# Patient Record
Sex: FEMALE | Race: WHITE | NOT HISPANIC OR LATINO | Employment: UNEMPLOYED | URBAN - METROPOLITAN AREA
[De-identification: names, ages, dates, MRNs, and addresses within clinical notes are randomized per-mention and may not be internally consistent; named-entity substitution may affect disease eponyms.]

---

## 2023-09-26 RX ORDER — MULTIPLE VITAMINS W/ MINERALS TAB 9MG-400MCG
1 TAB ORAL DAILY
COMMUNITY

## 2023-09-26 RX ORDER — CETIRIZINE HYDROCHLORIDE 10 MG/1
10 TABLET ORAL DAILY
COMMUNITY

## 2023-09-26 RX ORDER — OMEPRAZOLE 20 MG/1
20 CAPSULE, DELAYED RELEASE ORAL DAILY
COMMUNITY

## 2023-12-28 ENCOUNTER — OFFICE VISIT (OUTPATIENT)
Dept: BARIATRICS/WEIGHT MGMT | Facility: CLINIC | Age: 28
End: 2023-12-28
Payer: COMMERCIAL

## 2023-12-28 ENCOUNTER — DOCUMENTATION (OUTPATIENT)
Dept: BARIATRICS/WEIGHT MGMT | Facility: CLINIC | Age: 28
End: 2023-12-28
Payer: COMMERCIAL

## 2023-12-28 VITALS
OXYGEN SATURATION: 99 % | HEIGHT: 66 IN | DIASTOLIC BLOOD PRESSURE: 88 MMHG | BODY MASS INDEX: 38.81 KG/M2 | TEMPERATURE: 97.7 F | SYSTOLIC BLOOD PRESSURE: 138 MMHG | RESPIRATION RATE: 16 BRPM | WEIGHT: 241.5 LBS | HEART RATE: 89 BPM

## 2023-12-28 DIAGNOSIS — E66.9 OBESITY, CLASS II, BMI 35-39.9: Primary | ICD-10-CM

## 2023-12-28 DIAGNOSIS — R53.83 FATIGUE, UNSPECIFIED TYPE: ICD-10-CM

## 2023-12-28 DIAGNOSIS — K21.9 GASTROESOPHAGEAL REFLUX DISEASE, UNSPECIFIED WHETHER ESOPHAGITIS PRESENT: ICD-10-CM

## 2023-12-28 PROBLEM — Z86.19 HISTORY OF HELICOBACTER PYLORI INFECTION: Status: ACTIVE | Noted: 2023-12-28

## 2023-12-28 PROBLEM — R00.2 PALPITATIONS: Status: ACTIVE | Noted: 2023-12-28

## 2023-12-28 PROBLEM — Z86.14 HISTORY OF MRSA INFECTION: Status: ACTIVE | Noted: 2023-12-28

## 2023-12-28 PROBLEM — R73.03 PREDIABETES: Status: ACTIVE | Noted: 2023-12-28

## 2023-12-28 PROBLEM — E28.2 POLYCYSTIC OVARIAN DISEASE: Status: ACTIVE | Noted: 2023-12-28

## 2023-12-28 PROBLEM — R11.0 NAUSEA: Status: ACTIVE | Noted: 2023-12-28

## 2023-12-28 PROBLEM — M54.9 BACK PAIN: Status: ACTIVE | Noted: 2023-12-28

## 2023-12-28 PROBLEM — Z90.3 H/O GASTRIC SLEEVE: Status: ACTIVE | Noted: 2023-12-28

## 2023-12-28 PROBLEM — E66.812 OBESITY, CLASS II, BMI 35-39.9: Status: ACTIVE | Noted: 2023-12-28

## 2023-12-28 PROCEDURE — 99204 OFFICE O/P NEW MOD 45 MIN: CPT | Performed by: PHYSICIAN ASSISTANT

## 2023-12-28 RX ORDER — LOSARTAN POTASSIUM 25 MG/1
25 TABLET ORAL DAILY
COMMUNITY

## 2023-12-28 NOTE — PROGRESS NOTES
"Weight Loss Surgery  Presurgical Nutrition Assessment     Annie Kent  12/28/2023  [id number]  [id number]  1995   female    Surgery desired: Revision - Patient desires SIPS after sleeve gastrectomy surgery at another clinic on 03/18/2021, after which she lost from 268 # to 216 #.  She has regained to 241.5 #.     HEIGHT 167.6 cm (66\")   WEIGHT 110 kg (241.5 #)   BMI 38.98        Highest weight ever:   122 kg  (268 #)      No Known Allergies    Current Outpatient Medications:     brompheniramine-phenylephrine-DM 2.5-1-5 MG/5ML elixir elixir, Take 10 mL by mouth Every 4 (Four) Hours As Needed (sinus congestion, drainage and cough)., Disp: 237 mL, Rfl: 0    Calcium Carbonate Antacid (CALCIUM CARBONATE PO), Take  by mouth., Disp: , Rfl:     cetirizine (zyrTEC) 10 MG tablet, Take 1 tablet by mouth Daily., Disp: , Rfl:     losartan (COZAAR) 25 MG tablet, Take 1 tablet by mouth Daily., Disp: , Rfl:     multivitamin with minerals tablet tablet, Take 1 tablet by mouth Daily., Disp: , Rfl:     omeprazole (priLOSEC) 20 MG capsule, Take 1 capsule by mouth Daily., Disp: , Rfl:     vitamin D3 125 MCG (5000 UT) capsule capsule, Take 1 capsule by mouth Daily., Disp: , Rfl:       Subjective information: Met with patient and her  for pre-revision consult. Patient noted that she has not participated in any formal weight management program or effort, including WW. She stated lack of awareness of techniques such as putting a fork down between bites, ordering salad dressing on the side, then dipping a fork in for flavor rather than pouring it on the greens, consistently eating only 1/2 the bread with a sandwich or burger. She did not state principles of healthy eating for weight management, and also said that after sleeve gastrectomy, when she stopped losing weight easily after the first 6 months or so, she then began eating calorically dense foods as desired, as she had  in the past.  Additionally, as below, she often " eats throughout the night.  We discussed the need to work on her nutrition knowledge deficit and to make eating behavior changes before surgery so that she will be successful after revision surgery.     Nutrition Recall   Example of Usual 24 hour intake:     Breakfast: breakfast burrito with salsa, water with or without flavor packets, and coffee with sugar-free creamer    Lunch: ham and cheese sandwich with cervantes, with water to drink    Dinner: a balanced meal such as steak, mashed potatoes or noodles, and corn, with water    Snacks: especially enjoys sweets such as cookies.  Also, Slim Jims and cheese. Wakes up hungry and eats throughout the night.     Beverages of Choice: Primarily water or water with flavor packet. No soda, no sweet tea    Food Allergies or Intolerances: none stated or recorded          Exercise / Activity:  no personal planned activity program at this time. She states that she stopped exercising when she began re-gaining weight.      Assessment of Nutritional Adequacy, Excessive Intake or Deficiencies:        Protein intake is deficient to ensure successful weight loss. No protein-containing snacks reported                                       Processed / simple Carbohydrate intake is: high due to meal content of tortillas, bread for sandwiches, starchy vegetables: mashed potatoes, corn, noodles and fries with fast food                                       Balance of diet with a variety of fruits and vegetables is: diet is not balanced - no vegetables recorded                                                       Reliance on restaurant food including fast food is: several times per week: Mc[employer] sandwiches, grilled chicken nuggets with fries                                                                          Ingestion of sweet beverages eg soda, sweet tea, fruit juice: frequent high sugar snacks such as cookies      Education    Provided Nutrition Guidelines for Bariatric and Metabolic  Surgery   Reviewed guidelines for higher protein, limited carbohydrate diet to promote weight loss.  Encouraged patient to incorporate these principles of healthy eating.    Educated patient to wisely choose an appropriate protein supplement beverage for the post-surgery liquid diet.  Provided product guidelines and examples.    Explained importance of goal setting to help in changing eating behaviors that are not conducive to weight loss.  Specific macronutrient goals as below.   Provided follow-up options for support, including contact information for dietitians here.     Discussed importance of tracking grams of protein and carbohydrate in diet.  Web-based support information and apps for smart phones and computers given.        Nutrition Goals   Protein goal:  grams per day in three regular balanced meals and two to three high protein snacks each day, to ensure desired weight loss.   Carbohydrate goal:  100-140 grams per day  Beverage goal: Appropriate non-carbonated beverage intake.  Patient to wean self off of any sweet beverages, including soda.    Exercise Goals  Continue current exercise routine, if appropriate, and obtain approval from caregiver if physically limited for any reason.   Start activity plan per PCP/specialist advice if not currently exercising.     Recommend that team proceed with surgery and follow per protocol.   Alma Shields RD  12/28/2023  11:26 EST

## 2023-12-28 NOTE — PROGRESS NOTES
[facility]  [address]  [address]  [facility] [id number] [phone]      Patient  Name:  Annie Kent  :  1995      Date of Visit: 2023      Chief Complaint:  weight gain; unable to maintain weight loss      History of Present Illness:  Annie Kent is a 28 y.o. female who presents today for evaluation, education and consultation regarding revision metabolic and bariatric surgery with Dr. Arce.     Annie has been overweight for at least 20+ years, has been 35 pounds or more overweight for at least 20 years, and started dieting at a young age.  Previous diet attempts include: High Protein and Low Carbohydrate; None; None.  The most weight Annie lost was 50 pounds but was unable to maintain that weight loss.  Her maximum lifetime weight is 268 pounds.      S/p LSG 3/18/21 w/ Dr. Bird.     Weight prior to LSG 268lbs   Lowest weight achieved: 213lbs     Still feels like she has good restriction, but now just snack/grazing. Feels like she gets hungry quickly.     GI: Has had significant GERD since she had her sleeve gastrectomy.  She is on omeprazole 20 mg daily.  She has a history of H. pylori infection and was treated with a Prevpac.  She recently was retested and it was negative.  Reports chronic nausea with occasional vomiting since her sleeve.  She had a laparoscopic cholecystectomy in her teens for gallstones.    Other past medical history significant for diabetes, PCOS, palpitations, back pain, history of MRSA, anxiety     Complete history has been obtained and discussed today, as pertinent to metabolic/ bariatric surgery.     Past Medical History:   Diagnosis Date    Back pain     chiropractor; PRN nsaids    GERD (gastroesophageal reflux disease)     Omeprazole daily. EGD prior to LSG;    H/O gastric sleeve     Dr. Bird 3/2021    History of Helicobacter pylori infection     treated w/ Prevpak. Recently retested and was negative    History of  MRSA infection     skin boil    Nausea     chronic since LSG    Palpitations     Polycystic ovarian disease     Prediabetes      Past Surgical History:   Procedure Laterality Date     SECTION  2012    lower transverse     SECTION  2018    GASTRIC SLEEVE LAPAROSCOPIC  2021    Dr. Bird    LAPAROSCOPIC CHOLECYSTECTOMY  2010    gallstones       No Known Allergies    Current Outpatient Medications:     brompheniramine-phenylephrine-DM 2.5-1-5 MG/5ML elixir elixir, Take 10 mL by mouth Every 4 (Four) Hours As Needed (sinus congestion, drainage and cough)., Disp: 237 mL, Rfl: 0    Calcium Carbonate Antacid (CALCIUM CARBONATE PO), Take  by mouth., Disp: , Rfl:     cetirizine (zyrTEC) 10 MG tablet, Take 1 tablet by mouth Daily., Disp: , Rfl:     losartan (COZAAR) 25 MG tablet, Take 1 tablet by mouth Daily., Disp: , Rfl:     multivitamin with minerals tablet tablet, Take 1 tablet by mouth Daily., Disp: , Rfl:     omeprazole (priLOSEC) 20 MG capsule, Take 1 capsule by mouth Daily., Disp: , Rfl:     vitamin D3 125 MCG (5000 UT) capsule capsule, Take 1 capsule by mouth Daily., Disp: , Rfl:     Social History     Socioeconomic History    Marital status:    Tobacco Use    Smoking status: Never    Smokeless tobacco: Never   Vaping Use    Vaping Use: Never used   Substance and Sexual Activity    Alcohol use: Yes     Comment: once a month    Drug use: No    Sexual activity: Defer     Social History     Social History Narrative    Lives in [place], [place]  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       Family History   Problem Relation Age of Onset    No Known Problems Mother     Sleep apnea Father     No Known Problems Sister     No Known Problems Brother     Hypertension Maternal Grandmother     Hypertension Maternal Grandfather     Diabetes Maternal Grandfather     Hypertension Paternal Grandmother     Heart disease Paternal Grandmother     Heart attack Paternal  Grandmother     Hypertension Paternal Grandfather     Stroke Paternal Grandfather        Review of Systems:  Constitutional:  reports fatigue, weight gain and denies fevers, chills.  HEENT:  denies headache, ear pain or loss of hearing, blurred or double vision, nasal discharge or sore throat.  Cardiovascular:  reports HTN, palpitations and denies HLD, CAD, Atrial Fib, hx heart disease, heart murmur, hx MI, chest pain, edema, hx DVT.  Respiratory:  reports none and denies dyspnea on exertion, shortness of breath , cough , wheezing, sleep apnea, asthma, COPD, hx PE.  Gastrointestinal:  reports heartburn, nausea, vomiting, abdominal pain, diarrhea, constipation and denies IBS, melena, blood in stool, gallbladder issues, liver disease, hx pancreatitis.  Genitourinary:  reports none and denies history of  frequent UTI, incontinence, hematuria, dysuria, polyuria, polydipsia, renal insufficiency, renal failure.    Musculoskeletal:  reports joint pain, back pain and denies fibromyalgia, arthritis, and autoimmune disease.  Neurological:  reports none and denies headaches, migraines, numbness /tingling, dizziness, confusion, seizure, stroke.  Psychiatric:  reports feeling anxious, hx anxiety and denies depressed mood, hx depression, bipolar disorder, suicidal ideation, hx suicide attempt, hx self injury, hx substance abuse, eating disorder.  Endocrine:  reports PCOS, prediabetes and denies endometriosis, glucose intolerance, diabetes, gout.  Hematologic:  reports none and denies bruising, bleeding disorder, hx anemia, hx blood transfusion.  Skin:  reports hx MRSA and denies rashes.    Physical Exam:  Vital Signs:  Weight: 110 kg (241 lb 8 oz)   Body mass index is 38.98 kg/m².  Temp: 97.7 °F (36.5 °C)   Heart Rate: 89   BP: 138/88     Physical Exam  Constitutional:       Appearance: She is obese.   HENT:      Head: Normocephalic and atraumatic.   Eyes:      Extraocular Movements: Extraocular movements intact.       Conjunctiva/sclera: Conjunctivae normal.   Cardiovascular:      Rate and Rhythm: Normal rate and regular rhythm.   Pulmonary:      Effort: Pulmonary effort is normal.      Breath sounds: Normal breath sounds.   Abdominal:      General: Bowel sounds are normal. There is no distension.      Palpations: Abdomen is soft. There is no mass.      Tenderness: There is no abdominal tenderness.      Comments: Old lap scars   Musculoskeletal:         General: Normal range of motion.      Cervical back: Normal range of motion and neck supple.   Skin:     General: Skin is warm and dry.   Neurological:      General: No focal deficit present.      Mental Status: She is alert and oriented to person, place, and time.   Psychiatric:         Mood and Affect: Mood normal.         Behavior: Behavior normal.         Thought Content: Thought content normal.         Judgment: Judgment normal.         Patient Active Problem List   Diagnosis    Polycystic ovarian disease    Palpitations    H/O gastric sleeve    History of Helicobacter pylori infection    Nausea    GERD (gastroesophageal reflux disease)    Back pain    Prediabetes    History of MRSA infection    Fatigue    Obesity, Class II, BMI 35-39.9       Assessment:  28 y.o. female with medically complicated obesity pursuing Revision.    Metabolic & Bariatric Surgery is deemed medically necessary given the following: Class 2 Severe Obesity (BMI >=35 and <=39.9). Obesity-related health conditions include the following: hypertension, GERD, and PCOS, anxiety, prediabetes . Obesity is worsening. BMI is is above average; BMI management plan is completed. We discussed consulting a Bariatric surgeon.        Plan:  Unknown options at this time. Will proceed with UGI followed by EGD for further evaluation.     Pending above testing, further evaluation will include: CBC, CMP, Lipids, TSH, HgA1C, H.Pylori UBT, UGI, and EGD.    Additional clearances needed prior to surgery will include: Cardiology.      Patient understands that bariatric surgery is not cosmetic surgery but rather a tool to help make a lifelong commitment to lifestyle changes including diet, exercise and behavior modifications.  The patient has been educated today on those expected postoperative lifestyle changes.  Psychological and Nutritional consultations will be completed prior to surgery.  Instructions on how to access [employer] (an internet based site w/ educational surgical videos) were given to the patient.  Recommended perioperative vitamin supplementation was reviewed.  The importance of avoiding ASA/ NSAIDS/ steroids/ tobacco/nicotine/ hormones/ immunomodulators perioperatively was discussed in detail.  All questions/concerns have been addressed.      Further input to follow pending the above.           NICKI Jean

## 2024-01-26 ENCOUNTER — OFFICE VISIT (OUTPATIENT)
Dept: OBSTETRICS AND GYNECOLOGY | Facility: CLINIC | Age: 29
End: 2024-01-26
Payer: COMMERCIAL

## 2024-01-26 VITALS
BODY MASS INDEX: 39.34 KG/M2 | HEIGHT: 66 IN | SYSTOLIC BLOOD PRESSURE: 148 MMHG | WEIGHT: 244.8 LBS | DIASTOLIC BLOOD PRESSURE: 110 MMHG

## 2024-01-26 DIAGNOSIS — E66.9 OBESITY, CLASS II, BMI 35-39.9: ICD-10-CM

## 2024-01-26 DIAGNOSIS — Z30.46 NEXPLANON REMOVAL: ICD-10-CM

## 2024-01-26 DIAGNOSIS — Z30.09 BIRTH CONTROL COUNSELING: ICD-10-CM

## 2024-01-26 DIAGNOSIS — Z30.011 VISIT FOR ORAL CONTRACEPTIVE PRESCRIPTION: ICD-10-CM

## 2024-01-26 DIAGNOSIS — Z12.4 SCREENING FOR CERVICAL CANCER: Primary | ICD-10-CM

## 2024-01-26 DIAGNOSIS — Z90.3 H/O GASTRIC SLEEVE: ICD-10-CM

## 2024-01-26 DIAGNOSIS — K21.9 GASTROESOPHAGEAL REFLUX DISEASE WITHOUT ESOPHAGITIS: ICD-10-CM

## 2024-01-26 RX ORDER — LEVOCETIRIZINE DIHYDROCHLORIDE 5 MG/1
5 TABLET, FILM COATED ORAL EVERY EVENING
COMMUNITY
Start: 2024-01-20

## 2024-01-26 RX ORDER — CYANOCOBALAMIN 1000 UG/ML
INJECTION, SOLUTION INTRAMUSCULAR; SUBCUTANEOUS
COMMUNITY
Start: 2023-12-22

## 2024-01-26 RX ORDER — DROSPIRENONE 4 MG/1
1 TABLET, FILM COATED ORAL DAILY
Qty: 90 TABLET | Refills: 3 | Status: SHIPPED | OUTPATIENT
Start: 2024-01-26 | End: 2025-01-25

## 2024-01-26 NOTE — PROGRESS NOTES
Patient is a  and new to this practice, wishing to establish gynecological care. No LMP recorded (lmp unknown). Patient is premenopausal. Patient's menstruals have been absent since her Nexplanon insertion 2021. She wishes to have the Nexplanon removed. Her last pap smear was approximately 2022 and normal, per patient. History of abnormal pap smear as a teenager with normal follow up colposcopy and pap smears. She reports she has been getting yearly pap smears since the abnormal.    Past Medical History:   Diagnosis Date    Anxiety 2017    Back pain     chiropractor; PRN nsaids    Carpal tunnel syndrome, left     Chronic nausea     chronic since LSG    GERD (gastroesophageal reflux disease)     Omeprazole daily. EGD prior to LSG;    H/O gastric sleeve     Dr. Bird 3/2021    Hiatal hernia     History of abnormal cervical Pap smear     History of Helicobacter pylori infection     treated w/ Prevpak. Recently retested and was negative    History of MRSA infection     skin boil at buttock as child    History of postpartum hemorrhage     History of pre-eclampsia 2018    History of twin pregnancy in prior pregnancy     Hypertension     Left lateral epicondylitis     cortisone injections and PT    Nexplanon in place     inserted 2021    Palpitations     with high stress    Polycystic ovarian disease     PONV (postoperative nausea and vomiting)     Postpartum spinal headache     Prediabetes     per external records; controlled post-sleeve    Vitamin B deficiency       Past Surgical History:   Procedure Laterality Date     SECTION      lower transverse     SECTION  2018    COLPOSCOPY W/ BIOPSY / CURETTAGE      negative, per patient    D & C WITH SUCTION      EAB    GASTRIC SLEEVE LAPAROSCOPIC  2021    Dr. Bird    LAPAROSCOPIC CHOLECYSTECTOMY      gallstones    WISDOM TOOTH EXTRACTION  2015      Family History   Problem Relation Age of Onset    Sleep apnea Father      Hypertension Mother     No Known Problems Brother     No Known Problems Sister     Hypertension Paternal Grandfather     Stroke Paternal Grandfather     Hypertension Paternal Grandmother     Heart disease Paternal Grandmother     Heart attack Paternal Grandmother     Hypertension Maternal Grandmother     Hypertension Maternal Grandfather     Diabetes Maternal Grandfather     Pancreatic cancer Maternal Great-Grandfather     Breast cancer Neg Hx     Ovarian cancer Neg Hx     Colon cancer Neg Hx     Colon polyps Neg Hx      Problem List Items Addressed This Visit       Birth control counseling    Overview     Patient with history of hypertension on medication.  Unable to take estrogen containing oral contraceptives.  Contraceptive options reviewed including Mirena IUD.  Has been planning vasectomy.  Rx for Slynd sent to pharmacy.         GERD (gastroesophageal reflux disease)    H/O gastric sleeve    Overview     Gastric sleeve in 2021 at [facility].  60 pound weight loss initially and regained 35 pounds.  Has consulted with Dr. Arce for revision of sleeve and repair of hiatal hernia.         Nexplanon removal    Overview     History of Nexplanon insertion in September 2021.  No menses with Nexplanon.  1/26/2024 Nexplanon removal left arm without difficulty..         Relevant Orders    Removal of Nexplanon    Obesity, Class II, BMI 35-39.9    Overview     Gastric sleeve in 2021 at [facility].  60 pound weight loss initially and regained 35 pounds.  Has consulted with Dr. Arce for revision of sleeve and repair of hiatal hernia.         Screening for cervical cancer - Primary    Overview     History of abnormal Pap and colposcopy as a teenager.    Annual Pap smears have been normal.    Last Pap smear March 2023.   Unsure if had HPV screening.  Recommend HPV Greening with next Pap smear in March 2024.         Visit for oral contraceptive prescription    Overview     Rx for Slynd sent to  "pharmacy.           Procedure: Nexplanon removal    Removal of Nexplanon    Date/Time: 1/26/2024 1:08 PM    Performed by: Miguel Arriaga MD  Authorized by: Miguel Arriaga MD  Preparation: Patient was prepped and draped in the usual sterile fashion.  Local anesthesia used: yes  Anesthesia: local infiltration    Anesthesia:  Local anesthesia used: yes  Local Anesthetic: lidocaine 1% with epinephrine  Anesthetic total: 1.5 mL    Sedation:  Patient sedated: no    Patient tolerance: patient tolerated the procedure well with no immediate complications          Pre Dx: 1) Nexplanon removal  Post Dx: 1) Nexplanon removal   The risks, benefits, and alternatives to removal and reinsertion of the device have been discussed with the patient at length. All of her questions are answered. She is aware of the need for alternative means of contraception if desired. Verbal informed consent is obtained.    Patient does not have an allergy to betadine or shellfish.    Patient reports she is interested in discussing a new form of birth control today.    BP (!) 148/110 (BP Location: Right arm, Patient Position: Sitting, Cuff Size: Large Adult)   Ht 167.6 cm (66\")   Wt 111 kg (244 lb 12.8 oz)   LMP  (LMP Unknown)   BMI 39.51 kg/m²       Time out: immediate members of the procedure team and patient agree to the following: correct patient, correct site, correct procedure to be performed.   Miguel Arriaga MD      Able to easily palpate the device in the  Left arm. Additional imaging was not required. The device feels freely mobile and easily accessible. She was placed in the examining table in a supine position with her arm flexed at the elbow and hand under her head. The skin over this site is prepped with Betadine. 2-3 mL of 1% lidocaine with epinephrine was injected.    Downward pressure was applied on the proximal end of the implant nearest the axilla, and a 2-3 mm incision was made in a longitudinal direction of the " arm at the tip of the implant closest to the elbow. The implant was then pushed gently toward the incision until the tip was visible. The fibrous capsule was opened with blunt dissection using a hemostat. The implant was grasp with a hemostat and was easily removed intact. It measured a  full 4 cm in length.    Steristrip was placed over incision site as well as a pressure dressing.     Tolerated well  No apparent complications  She was advised to call or return for complications such as fever, signs of infection, increased pain, or any other concerns.    Warning signs, limitations and expectations reviewed.     Miguel Arriaga MD  01/26/2024

## 2024-01-29 ENCOUNTER — TELEPHONE (OUTPATIENT)
Dept: OBSTETRICS AND GYNECOLOGY | Facility: CLINIC | Age: 29
End: 2024-01-29
Payer: COMMERCIAL

## 2024-01-29 NOTE — TELEPHONE ENCOUNTER
Prior Auth pending for Slynd. Her Nexplanon was removed. She has  HTN and needs the POP. Approved today  PA Case: [id number], Status: Approved, Coverage Starts on: 1/29/2024 12:00:00 AM, Coverage Ends on: 1/28/2025 12:00:00 AM.  Authorization Expiration Date: 1/27/2025. Pharmacy contacted - it was still very expensive but with the Slynd Savings card it was $25 for 84.

## 2024-02-09 ENCOUNTER — HOSPITAL ENCOUNTER (OUTPATIENT)
Dept: GENERAL RADIOLOGY | Facility: HOSPITAL | Age: 29
Discharge: HOME | End: 2024-02-09
Admitting: PHYSICIAN ASSISTANT
Payer: COMMERCIAL

## 2024-02-09 DIAGNOSIS — K21.9 GASTROESOPHAGEAL REFLUX DISEASE, UNSPECIFIED WHETHER ESOPHAGITIS PRESENT: ICD-10-CM

## 2024-02-09 PROCEDURE — 74240 X-RAY XM UPR GI TRC 1CNTRST: CPT

## 2024-02-13 DIAGNOSIS — K21.9 GASTROESOPHAGEAL REFLUX DISEASE, UNSPECIFIED WHETHER ESOPHAGITIS PRESENT: Primary | ICD-10-CM

## 2024-03-22 ENCOUNTER — TELEMEDICINE (OUTPATIENT)
Dept: BARIATRICS/WEIGHT MGMT | Facility: CLINIC | Age: 29
End: 2024-03-22
Payer: COMMERCIAL

## 2024-03-22 DIAGNOSIS — K21.9 GASTROESOPHAGEAL REFLUX DISEASE, UNSPECIFIED WHETHER ESOPHAGITIS PRESENT: Primary | ICD-10-CM

## 2024-03-22 DIAGNOSIS — E66.9 OBESITY, CLASS II, BMI 35-39.9: ICD-10-CM

## 2024-03-22 PROCEDURE — 99214 OFFICE O/P EST MOD 30 MIN: CPT | Performed by: PHYSICIAN ASSISTANT

## 2024-03-22 NOTE — PROGRESS NOTES
"[facility]  [address]  [address]  [facility] [id number] [phone]    This visit was conducted as a video visit, in an effort to limit spread of the novel coronavirus during the COVID-19 pandemic.  The patient gave consent.        Patient Name:  Annie Kent  :  1995      Date of Visit: 2024      Reason for Visit:   weight gain; GERD      HPI: Annie Kent is a 28 y.o. female s/p LSG 3/18/21 w/ Dr. Bird, pursuing revision with Dr. Arce.     Per initial intake eval 23:     \"Annie has been overweight for at least 20+ years, has been 35 pounds or more overweight for at least 20 years, and started dieting at a young age.  Previous diet attempts include: High Protein and Low Carbohydrate; None; None.  The most weight Annie lost was 50 pounds but was unable to maintain that weight loss.  Her maximum lifetime weight is 268 pounds.       S/p LSG 3/18/21 w/ Dr. Bird.      Weight prior to LSG 268lbs   Lowest weight achieved: 213lbs      Still feels like she has good restriction, but now just snack/grazing. Feels like she gets hungry quickly.      GI: Has had significant GERD since she had her sleeve gastrectomy.  She is on omeprazole 20 mg daily.  She has a history of H. pylori infection and was treated with a Prevpac.  She recently was retested and it was negative.  Reports chronic nausea with occasional vomiting since her sleeve.  She had a laparoscopic cholecystectomy in her teens for gallstones.     Other past medical history significant for diabetes, PCOS, palpitations, back pain, history of MRSA, anxiety\"    UGI 24    IMPRESSION:  1. Small hiatal hernia.  2. Spontaneous GE reflux as above.  3. Postoperative changes of the stomach.      No major changes to medical history or recent illness/hospitalizations. UGI reviewed as above. She is eager to proceed with EGD.     Past Medical History:   Diagnosis Date    Anxiety 2017    Back pain     " chiropractor; PRN nsaids    Carpal tunnel syndrome, left     Chronic nausea     chronic since LSG    GERD (gastroesophageal reflux disease)     Omeprazole daily. EGD prior to LSG;    H/O gastric sleeve     Dr. Bird 3/2021    Hiatal hernia     History of abnormal cervical Pap smear     History of Helicobacter pylori infection     treated w/ Prevpak. Recently retested and was negative    History of MRSA infection     skin boil at buttock as child    History of postpartum hemorrhage     History of pre-eclampsia 2018    History of twin pregnancy in prior pregnancy     Hypertension     Left lateral epicondylitis     cortisone injections and PT    Nexplanon in place     inserted 2021    Palpitations     with high stress    Polycystic ovarian disease     PONV (postoperative nausea and vomiting)     Postpartum spinal headache     Prediabetes     per external records; controlled post-sleeve    Vitamin B deficiency      Past Surgical History:   Procedure Laterality Date     SECTION      lower transverse     SECTION  2018    COLPOSCOPY W/ BIOPSY / CURETTAGE      negative, per patient    D & C WITH SUCTION      EAB    GASTRIC SLEEVE LAPAROSCOPIC  2021    Dr. Bird    LAPAROSCOPIC CHOLECYSTECTOMY      gallstones    WISDOM TOOTH EXTRACTION       Outpatient Medications Marked as Taking for the 3/22/24 encounter (Telemedicine) with Lisa Parada PA   Medication Sig Dispense Refill    Calcium Carbonate Antacid (CALCIUM CARBONATE PO) Take  by mouth.      cetirizine (zyrTEC) 10 MG tablet Take 1 tablet by mouth Daily.      cyanocobalamin 1000 MCG/ML injection INJECT 1ML INTRAMUSCULARLY ONCE WEEKLY FOR FOUR WEEKS, THEN INJECT 1ML INTRAMUSCULARLY ONCE MONTHLY THEREAFTER      Drospirenone (Slynd) 4 MG tablet Take 1 tablet by mouth Daily. 90 tablet 3    levocetirizine (XYZAL) 5 MG tablet Take 1 tablet by mouth Every Evening.      losartan (COZAAR) 25 MG tablet Take 1 tablet by mouth Daily.       multivitamin with minerals tablet tablet Take 1 tablet by mouth Daily.      omeprazole (priLOSEC) 20 MG capsule Take 1 capsule by mouth Daily.      vitamin D3 125 MCG (5000 UT) capsule capsule Take 1 capsule by mouth Daily.         Allergies   Allergen Reactions    Lisinopril Cough       Social History     Socioeconomic History    Marital status:    Tobacco Use    Smoking status: Never    Smokeless tobacco: Never   Vaping Use    Vaping status: Never Used   Substance and Sexual Activity    Alcohol use: Yes     Comment: once a month, social    Drug use: No    Sexual activity: Yes     Partners: Male     Birth control/protection: Nexplanon     Social History     Social History Narrative    Lives in [place], [place]  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.       Review of Systems   General: + weight gain and fatigue. Denies fever, chills, unintentional weight loss   HEENT: Denies nasal congestion, change in vision, ear pain, change in hearing, sore throat   CARDIAC: Denies chest pain, palpitations, edema   RESP: denies shortness of breath, cough, wheezing   GI: + CHELSEY. Denies abdominal pain, nausea, vomiting, diarrhea, constipation,   Urinary: Denies polyuria, dysuria, hematuria   MSK: denies joint pain, swelling, gout, joint stiffness   Neuro: Denies seizures, weakness, numbness/tingling, LOC   Heme/Endo: denies bleeding, bruising, heat/cold intolerance, sweating   Psych: denies depression, anxiety    There were no vitals taken for this visit.    Physical Exam  Constitutional:       General: She is not in acute distress.  Pulmonary:      Effort: Pulmonary effort is normal.   Neurological:      Mental Status: She is alert and oriented to person, place, and time.   Psychiatric:         Mood and Affect: Mood normal.         Behavior: Behavior normal.         Thought Content: Thought content normal.         Judgment: Judgment normal.           Assessment:      ICD-10-CM  ICD-9-CM   1. Gastroesophageal reflux disease, unspecified whether esophagitis present  K21.9 530.81   2. Obesity, Class II, BMI 35-39.9  E66.9 278.00              Plan:  Will proceed with EGD. The risks and benefits of the upper endoscopy were discussed with the patient in detail and all questions were answered.  Possibility of perforation, bleeding, aspiration, and anesthesia reaction were reviewed.  Patient agrees to proceed.

## 2024-03-25 ENCOUNTER — LAB REQUISITION (OUTPATIENT)
Dept: LAB | Facility: HOSPITAL | Age: 29
End: 2024-03-25
Payer: COMMERCIAL

## 2024-03-25 DIAGNOSIS — K21.9 GASTRO-ESOPHAGEAL REFLUX DISEASE WITHOUT ESOPHAGITIS: ICD-10-CM

## 2024-03-25 PROCEDURE — 88305 TISSUE EXAM BY PATHOLOGIST: CPT | Performed by: SURGERY

## 2024-03-26 ENCOUNTER — TELEPHONE (OUTPATIENT)
Dept: BARIATRICS/WEIGHT MGMT | Facility: CLINIC | Age: 29
End: 2024-03-26
Payer: COMMERCIAL

## 2024-03-26 LAB
CYTO UR: NORMAL
LAB AP CASE REPORT: NORMAL
LAB AP CLINICAL INFORMATION: NORMAL
PATH REPORT.FINAL DX SPEC: NORMAL
PATH REPORT.GROSS SPEC: NORMAL

## 2024-03-26 NOTE — TELEPHONE ENCOUNTER
----- Message from Rhett Arce MD sent at 3/25/2024  9:47 AM EDT -----  Please have her see me to discuss options, thanks!

## 2024-03-29 ENCOUNTER — OFFICE VISIT (OUTPATIENT)
Dept: OBSTETRICS AND GYNECOLOGY | Facility: CLINIC | Age: 29
End: 2024-03-29
Payer: COMMERCIAL

## 2024-03-29 VITALS
HEIGHT: 66 IN | DIASTOLIC BLOOD PRESSURE: 90 MMHG | WEIGHT: 243 LBS | SYSTOLIC BLOOD PRESSURE: 142 MMHG | BODY MASS INDEX: 39.05 KG/M2

## 2024-03-29 DIAGNOSIS — Z30.09 BIRTH CONTROL COUNSELING: ICD-10-CM

## 2024-03-29 DIAGNOSIS — N92.6 IRREGULAR MENSES: ICD-10-CM

## 2024-03-29 DIAGNOSIS — E28.2 POLYCYSTIC OVARIAN DISEASE: ICD-10-CM

## 2024-03-29 DIAGNOSIS — Z01.419 ENCOUNTER FOR ANNUAL ROUTINE GYNECOLOGICAL EXAMINATION: Primary | ICD-10-CM

## 2024-03-29 DIAGNOSIS — Z90.3 H/O GASTRIC SLEEVE: ICD-10-CM

## 2024-03-29 DIAGNOSIS — Z12.4 SCREENING FOR CERVICAL CANCER: ICD-10-CM

## 2024-03-29 DIAGNOSIS — E66.9 OBESITY, CLASS II, BMI 35-39.9: ICD-10-CM

## 2024-03-29 DIAGNOSIS — R73.03 PREDIABETES: ICD-10-CM

## 2024-03-29 DIAGNOSIS — K21.9 GASTROESOPHAGEAL REFLUX DISEASE, UNSPECIFIED WHETHER ESOPHAGITIS PRESENT: ICD-10-CM

## 2024-03-29 PROBLEM — Z30.011 VISIT FOR ORAL CONTRACEPTIVE PRESCRIPTION: Status: RESOLVED | Noted: 2024-01-26 | Resolved: 2024-03-29

## 2024-03-29 PROBLEM — E88.819 INSULIN RESISTANCE: Status: ACTIVE | Noted: 2024-03-29

## 2024-03-29 NOTE — PROGRESS NOTES
Gynecologic Annual Exam Note        Gynecologic Exam        Subjective     HPI  Annie Kent is a 28 y.o.  female who presents for annual well woman exam as an established patient. Since her last visit the patient was diagnosed with a small hiatal hernia, post EGD 3/25/24 . No LMP recorded (lmp unknown). Patient is premenopausal.  Her menstruals were absent with the Nexplanon in place; since Nexplanon removal on 24, she has not had a menstrual. She reports her UPT pre-EGD was negative on 3/25/24.   Marital Status: . She is sexually active. She has not had new partners. STD testing recommendations have been explained to the patient and she does not desire STD testing.    The patient would like to discuss the following complaints today: Recheck for BV; treated in December for it, but no test of cure. Patient denies current discharge or malodor.      Additional OB/GYN History   contraceptive methods: Vasectomy - performed 3/21 by Dr. Decker. Recheck for active sperm slated for ; pull-out method used as backup (no condoms). Patient reports she did not  the Slynd.  Desires to: do not start contraception  Thromboembolic Disease: none  History of migraines: yes without aura  Age of menarche: 12    History of STD: no    Last Pap: 2023. Results: negative. HPV: unknown .   Last Completed Pap Smear            Ordered - PAP SMEAR (Every 3 Years) Ordered on 3/29/2024      04/15/2023  Done - negative, per patient                     History of abnormal Pap smear: yes - led to colposcopy; normal since  Gardasil status: declines  Family history of uterine, colon, breast, or ovarian cancer: no  Performs monthly Self-Breast Exam: not monthly  Exercises Regularly: no, but active at work  Feelings of Anxiety or Depression: yes - mostly controlled; has tried medication before and did not like it. Reports she has historically been in therapy  Tobacco Usage?: No       Current Outpatient Medications:      Calcium Carbonate Antacid (CALCIUM CARBONATE PO), Take  by mouth., Disp: , Rfl:     cetirizine (zyrTEC) 10 MG tablet, Take 1 tablet by mouth Daily., Disp: , Rfl:     cyanocobalamin 1000 MCG/ML injection, INJECT 1ML INTRAMUSCULARLY ONCE WEEKLY FOR FOUR WEEKS, THEN INJECT 1ML INTRAMUSCULARLY ONCE MONTHLY THEREAFTER, Disp: , Rfl:     losartan (COZAAR) 25 MG tablet, Take 1 tablet by mouth Daily., Disp: , Rfl:     multivitamin with minerals tablet tablet, Take 1 tablet by mouth Daily., Disp: , Rfl:     omeprazole (priLOSEC) 20 MG capsule, Take 1 capsule by mouth Daily., Disp: , Rfl:     vitamin D3 125 MCG (5000 UT) capsule capsule, Take 1 capsule by mouth Daily., Disp: , Rfl:     Drospirenone (Slynd) 4 MG tablet, Take 1 tablet by mouth Daily. (Patient not taking: Reported on 3/29/2024), Disp: 90 tablet, Rfl: 3    levocetirizine (XYZAL) 5 MG tablet, Take 1 tablet by mouth Every Evening. (Patient not taking: Reported on 3/29/2024), Disp: , Rfl:      Patient denies the need for medication refills today.    OB History          3    Para   2    Term   1       1    AB   1    Living   3         SAB   0    IAB   1    Ectopic   0    Molar   0    Multiple   1    Live Births   3                Health Maintenance   Topic Date Due    Annual Gynecologic Pelvic and Breast Exam  Never done    TDAP/TD VACCINES (1 - Tdap) Never done    INFLUENZA VACCINE  Never done    COVID-19 Vaccine (3 - 2023-24 season) 2023    HEPATITIS C SCREENING  Never done    ANNUAL PHYSICAL  Never done    BMI FOLLOWUP  2024    PAP SMEAR  04/15/2026    Pneumococcal Vaccine 0-64  Aged Out       Past Medical History:   Diagnosis Date    Anxiety 2017    Back pain     chiropractor; PRN nsaids    Carpal tunnel syndrome, left     Chronic nausea     chronic since LSG    GERD (gastroesophageal reflux disease)     Omeprazole daily. EGD prior to LSG;    H/O gastric sleeve     Dr. Bird 3/2021    Hiatal hernia     History of abnormal cervical Pap  smear     History of Helicobacter pylori infection     treated w/ Prevpak. Recently retested and was negative    History of MRSA infection     skin boil at buttock as child    History of postpartum hemorrhage     History of pre-eclampsia 2018    History of twin pregnancy in prior pregnancy     Hypertension     Left lateral epicondylitis     cortisone injections and PT    Palpitations     with high stress    Polycystic ovarian disease     PONV (postoperative nausea and vomiting)     Postpartum spinal headache     Prediabetes     per external records; controlled post-sleeve    Vitamin B deficiency         Past Surgical History:   Procedure Laterality Date     SECTION      lower transverse     SECTION      COLPOSCOPY W/ BIOPSY / CURETTAGE      negative, per patient    D & C WITH SUCTION      EAB    ENDOSCOPY  2024    with biopsies    GASTRIC SLEEVE LAPAROSCOPIC  2021    Dr. Bird    LAPAROSCOPIC CHOLECYSTECTOMY      gallstones    WISDOM TOOTH EXTRACTION         The additional following portions of the patient's history were reviewed and updated as appropriate: allergies, current medications, past family history, past medical history, past social history, past surgical history, and problem list.    Review of Systems   Constitutional: Negative.  Negative for unexpected weight gain.   HENT: Negative.     Eyes: Negative.    Respiratory: Negative.     Cardiovascular: Negative.    Gastrointestinal: Negative.    Endocrine: Negative.    Genitourinary:  Positive for menstrual problem. Negative for breast discharge, breast lump, breast pain, dysuria, pelvic pain and pelvic pressure.   Musculoskeletal: Negative.    Skin: Negative.    Allergic/Immunologic: Negative.    Neurological: Negative.    Hematological: Negative.    Psychiatric/Behavioral: Negative.           I have reviewed and agree with the HPI, ROS, and historical information as entered above.   Miguel Arriaga,  "MD          Objective   /90 (BP Location: Right arm, Patient Position: Sitting, Cuff Size: Large Adult)   Ht 167.6 cm (66\")   Wt 110 kg (243 lb)   LMP  (LMP Unknown)   BMI 39.22 kg/m²     Physical Exam  Vitals and nursing note reviewed. Exam conducted with a chaperone present.   Constitutional:       Appearance: Normal appearance. She is well-developed. She is obese.   HENT:      Head: Normocephalic and atraumatic.   Neck:      Thyroid: No thyroid mass or thyromegaly.   Cardiovascular:      Rate and Rhythm: Normal rate and regular rhythm.      Heart sounds: Normal heart sounds. No murmur heard.  Pulmonary:      Effort: Pulmonary effort is normal. No retractions.      Breath sounds: Normal breath sounds. No wheezing, rhonchi or rales.   Chest:      Chest wall: No mass or tenderness.   Breasts:     Right: Normal. No mass, nipple discharge, skin change or tenderness.      Left: Normal. No mass, nipple discharge, skin change or tenderness.   Abdominal:      General: Bowel sounds are normal.      Palpations: Abdomen is soft. Abdomen is not rigid. There is no mass.      Tenderness: There is no abdominal tenderness. There is no guarding.      Hernia: No hernia is present. There is no hernia in the left inguinal area.   Genitourinary:     General: Normal vulva.      Exam position: Lithotomy position.      Labia:         Right: No rash, tenderness or lesion.         Left: No rash, tenderness or lesion.       Vagina: Normal. No vaginal discharge or lesions.      Cervix: No cervical motion tenderness, discharge, lesion or cervical bleeding.      Uterus: Normal. Not enlarged, not fixed and not tender.       Adnexa:         Right: No mass or tenderness.          Left: No mass or tenderness.        Rectum: No external hemorrhoid.      Comments: Uterus high in pelvis; no Nexa masses or tenderness.  Musculoskeletal:      Cervical back: Normal range of motion. No muscular tenderness.   Neurological:      Mental Status: She " is alert and oriented to person, place, and time.   Psychiatric:         Behavior: Behavior normal.            Assessment and Plan    Problem List Items Addressed This Visit          Gynecologic and Obstetric Problems    Irregular menses       Other    Birth control counseling    Overview     Patient with history of hypertension on medication.  Unable to take estrogen containing oral contraceptives.  Contraceptive options reviewed including Mirena IUD.  Has been planning vasectomy.  Rx for Slynd sent to pharmacy.         Obesity, Class II, BMI 35-39.9    Overview     Gastric sleeve in 2021 at [facility].  60 pound weight loss initially and regained 35 pounds.  Has consulted with Dr. Arce for revision of sleeve and repair of hiatal hernia.         Relevant Orders    Basic Metabolic Panel    Insulin, Random    Screening for cervical cancer    Overview     History of abnormal Pap and colposcopy as a teenager.    Annual Pap smears have been normal.    Last Pap smear March 2023.   Unsure if had HPV screening.  Recommend HPV Screening with next Pap smear in March 2024.         Relevant Orders    LIQUID-BASED PAP SMEAR WITH HPV GENOTYPING REGARDLESS OF INTERPRETATION ([facility])     Other Visit Diagnoses       Encounter for annual routine gynecological examination    -  Primary    Relevant Orders    LIQUID-BASED PAP SMEAR WITH HPV GENOTYPING REGARDLESS OF INTERPRETATION ([facility])            GYN annual well woman exam.  28 years of age.   status post recent vasectomy.  Never started Slynd for oral contraceptive.  History of irregular menses and PCOS.  No menses post Nexplanon removal in January 2024.  Recheck PCOS labs.  History of gastric sleeve.  Initially had 60 pound weight loss.  Has regained 25 pounds.  History of insulin resistance.  Repeat fasting insulin post weight loss surgery.  May benefit from metformin or Wegovy.  Reviewed pap guidelines.  Pap with HPV screen done.  If negative can  repeat in 3 years.  Reviewed monthly self breast exams.  Instructed to call with lumps, pain, or breast discharge.    Reviewed BMI and weight loss as preventative health measures.   Reccommended Flu Vaccine in Fall of each year.  RTC in 1 year or PRN with problems  No follow-ups on file.    Miguel Arriaga MD  03/29/2024

## 2024-04-02 DIAGNOSIS — A04.8 H. PYLORI INFECTION: Primary | ICD-10-CM

## 2024-04-02 RX ORDER — AMOXICILLIN 500 MG/1
1000 CAPSULE ORAL 2 TIMES DAILY
Qty: 56 CAPSULE | Refills: 0 | Status: SHIPPED | OUTPATIENT
Start: 2024-04-02 | End: 2024-04-16

## 2024-04-02 RX ORDER — OMEPRAZOLE 20 MG/1
20 CAPSULE, DELAYED RELEASE ORAL 2 TIMES DAILY
Qty: 28 CAPSULE | Refills: 0 | Status: SHIPPED | OUTPATIENT
Start: 2024-04-02 | End: 2024-04-16

## 2024-04-02 RX ORDER — CLARITHROMYCIN 500 MG/1
500 TABLET, COATED ORAL 2 TIMES DAILY
Qty: 28 TABLET | Refills: 0 | Status: SHIPPED | OUTPATIENT
Start: 2024-04-02 | End: 2024-04-16

## 2024-04-04 LAB — REF LAB TEST METHOD: NORMAL

## 2024-04-04 RX ORDER — FLUCONAZOLE 150 MG/1
150 TABLET ORAL ONCE
Qty: 1 TABLET | Refills: 0 | Status: SHIPPED | OUTPATIENT
Start: 2024-04-04 | End: 2024-04-04

## 2024-04-30 ENCOUNTER — CONSULT (OUTPATIENT)
Dept: BARIATRICS/WEIGHT MGMT | Facility: CLINIC | Age: 29
End: 2024-04-30
Payer: COMMERCIAL

## 2024-04-30 VITALS
HEART RATE: 85 BPM | RESPIRATION RATE: 16 BRPM | WEIGHT: 237.5 LBS | BODY MASS INDEX: 38.17 KG/M2 | DIASTOLIC BLOOD PRESSURE: 76 MMHG | HEIGHT: 66 IN | OXYGEN SATURATION: 100 % | SYSTOLIC BLOOD PRESSURE: 124 MMHG | TEMPERATURE: 97.3 F

## 2024-04-30 DIAGNOSIS — K44.9 HIATAL HERNIA WITH GASTROESOPHAGEAL REFLUX: ICD-10-CM

## 2024-04-30 DIAGNOSIS — Z98.84 S/P LAPAROSCOPIC SLEEVE GASTRECTOMY: ICD-10-CM

## 2024-04-30 DIAGNOSIS — K21.9 HIATAL HERNIA WITH GASTROESOPHAGEAL REFLUX: ICD-10-CM

## 2024-04-30 DIAGNOSIS — K31.1 PARTIAL GASTRIC OUTLET OBSTRUCTION: Primary | ICD-10-CM

## 2024-04-30 PROCEDURE — 99214 OFFICE O/P EST MOD 30 MIN: CPT | Performed by: SURGERY

## 2024-04-30 RX ORDER — PANTOPRAZOLE SODIUM 40 MG/10ML
40 INJECTION, POWDER, LYOPHILIZED, FOR SOLUTION INTRAVENOUS ONCE
OUTPATIENT
Start: 2024-04-30 | End: 2024-04-30

## 2024-04-30 RX ORDER — SODIUM CHLORIDE 9 MG/ML
40 INJECTION, SOLUTION INTRAVENOUS AS NEEDED
OUTPATIENT
Start: 2024-04-30

## 2024-04-30 RX ORDER — ENOXAPARIN SODIUM 100 MG/ML
40 INJECTION SUBCUTANEOUS ONCE
OUTPATIENT
Start: 2024-04-30 | End: 2024-04-30

## 2024-04-30 RX ORDER — SCOLOPAMINE TRANSDERMAL SYSTEM 1 MG/1
1 PATCH, EXTENDED RELEASE TRANSDERMAL ONCE
OUTPATIENT
Start: 2024-04-30 | End: 2024-04-30

## 2024-04-30 RX ORDER — OMEPRAZOLE 40 MG/1
40 CAPSULE, DELAYED RELEASE ORAL DAILY
COMMUNITY

## 2024-04-30 RX ORDER — SODIUM CHLORIDE 0.9 % (FLUSH) 0.9 %
3 SYRINGE (ML) INJECTION EVERY 12 HOURS SCHEDULED
OUTPATIENT
Start: 2024-04-30

## 2024-04-30 RX ORDER — GABAPENTIN 100 MG/1
600 CAPSULE ORAL ONCE
OUTPATIENT
Start: 2024-04-30 | End: 2024-04-30

## 2024-04-30 RX ORDER — SODIUM CHLORIDE, SODIUM LACTATE, POTASSIUM CHLORIDE, CALCIUM CHLORIDE 600; 310; 30; 20 MG/100ML; MG/100ML; MG/100ML; MG/100ML
150 INJECTION, SOLUTION INTRAVENOUS CONTINUOUS
OUTPATIENT
Start: 2024-04-30

## 2024-04-30 RX ORDER — SODIUM CHLORIDE 0.9 % (FLUSH) 0.9 %
3-10 SYRINGE (ML) INJECTION AS NEEDED
OUTPATIENT
Start: 2024-04-30

## 2024-04-30 RX ORDER — ACETAMINOPHEN 500 MG
1000 TABLET ORAL ONCE
OUTPATIENT
Start: 2024-04-30 | End: 2024-04-30

## 2024-04-30 RX ORDER — CHLORHEXIDINE GLUCONATE ORAL RINSE 1.2 MG/ML
30 SOLUTION DENTAL
OUTPATIENT
Start: 2024-04-30 | End: 2024-04-30

## 2024-05-24 ENCOUNTER — OFFICE VISIT (OUTPATIENT)
Dept: BARIATRICS/WEIGHT MGMT | Facility: CLINIC | Age: 29
End: 2024-05-24
Payer: COMMERCIAL

## 2024-05-24 VITALS
WEIGHT: 230.5 LBS | HEIGHT: 66 IN | TEMPERATURE: 98 F | HEART RATE: 80 BPM | DIASTOLIC BLOOD PRESSURE: 74 MMHG | SYSTOLIC BLOOD PRESSURE: 118 MMHG | BODY MASS INDEX: 37.04 KG/M2

## 2024-05-24 DIAGNOSIS — K29.60 BILE REFLUX GASTRITIS: ICD-10-CM

## 2024-05-24 DIAGNOSIS — A04.8 H. PYLORI INFECTION: ICD-10-CM

## 2024-05-24 DIAGNOSIS — K31.1 PARTIAL GASTRIC OUTLET OBSTRUCTION: Primary | ICD-10-CM

## 2024-05-24 DIAGNOSIS — K44.9 HIATAL HERNIA WITH GASTROESOPHAGEAL REFLUX: ICD-10-CM

## 2024-05-24 DIAGNOSIS — K21.9 GASTROESOPHAGEAL REFLUX DISEASE, UNSPECIFIED WHETHER ESOPHAGITIS PRESENT: ICD-10-CM

## 2024-05-24 DIAGNOSIS — K21.9 HIATAL HERNIA WITH GASTROESOPHAGEAL REFLUX: ICD-10-CM

## 2024-05-24 PROCEDURE — 99213 OFFICE O/P EST LOW 20 MIN: CPT | Performed by: SURGERY

## 2024-05-24 RX ORDER — VALACYCLOVIR HYDROCHLORIDE 500 MG/1
500 TABLET, FILM COATED ORAL DAILY
COMMUNITY
Start: 2024-05-23

## 2024-05-24 RX ORDER — CHOLESTYRAMINE 4 G/9G
4 POWDER, FOR SUSPENSION ORAL
Qty: 90 PACKET | Refills: 11 | Status: SHIPPED | OUTPATIENT
Start: 2024-05-24

## 2024-05-24 RX ORDER — ONDANSETRON 4 MG/1
4 TABLET, ORALLY DISINTEGRATING ORAL EVERY 8 HOURS PRN
Qty: 60 TABLET | Refills: 11 | Status: SHIPPED | OUTPATIENT
Start: 2024-05-24

## 2024-05-24 NOTE — PROGRESS NOTES
[facility]ariat[facility] Surgery  [address]  [address]  [facility] [id number] [phone]        Patient Name: Annie Kent.  YOB: 1995      Date of Visit: 05/24/2024      Reason for Visit:  follow up    HPI:  Annie Kent is a 28 y.o. female pursuing HHR, conversion of sleeve to RYGB with Dr. 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 Bird at [facility].  She saw Dr. Arce 4/30/24 to discuss options, and the above surgery was planned.     Was hopeful to have this surgery in August when she had 1 week off, and reports we planned to do prior auth closer to time of surgery in June.  But past week had burning lower abdominal discomfort and worsening acid reflux/nausea.  Has nausea with all oral intake.  Does not take zofran b/c she feels it causes constipation.    She can tolerate water and other liquids.  Was able to tolerate a small amount of grilled chicken yesterday but usually this is problematic.          Past Medical History:   Diagnosis Date    Anxiety 2017    Back pain     chiropractor; PRN nsaids    Carpal tunnel syndrome, left     Chronic nausea     chronic since LSG    GERD (gastroesophageal reflux disease)     Omeprazole daily. EGD prior to LSG;    H/O gastric sleeve     Dr. Bird 3/2021    Hiatal hernia     History of abnormal cervical Pap smear     History of Helicobacter pylori infection     treated w/ Prevpak. Recently retested and was negative.  EGD Dr. Arce 3/24 - abundant h. pylori, PrevPak prescribed, re-test pending    History of MRSA infection     skin boil at buttock as child    History of postpartum hemorrhage     History of pre-eclampsia 2018    History of twin pregnancy in prior pregnancy     Hypertension     Joint pain     Left lateral epicondylitis     cortisone injections and PT    DAMIEN (obstructive sleep apnea)     Palpitations     with high stress    Polycystic ovarian disease     PONV (postoperative  nausea and vomiting)     Postpartum spinal headache     Prediabetes     per external records; controlled post-sleeve    Tendonitis of elbow, left     Vitamin B deficiency      Past Surgical History:   Procedure Laterality Date     SECTION      lower transverse     SECTION      COLPOSCOPY W/ BIOPSY / CURETTAGE      negative, per patient    D & C WITH SUCTION      EAB    ENDOSCOPY  2024    with biopsies    GASTRIC SLEEVE LAPAROSCOPIC  2021    Dr. Bird    LAPAROSCOPIC CHOLECYSTECTOMY      gallstones    WISDOM TOOTH EXTRACTION       Outpatient Medications Marked as Taking for the 24 encounter (Office Visit) with Ellie Wolfe MD   Medication Sig Dispense Refill    Calcium Carbonate Antacid (CALCIUM CARBONATE PO) Take  by mouth.      cetirizine (zyrTEC) 10 MG tablet Take 1 tablet by mouth Daily.      cyanocobalamin 1000 MCG/ML injection INJECT 1ML INTRAMUSCULARLY ONCE WEEKLY FOR FOUR WEEKS, THEN INJECT 1ML INTRAMUSCULARLY ONCE MONTHLY THEREAFTER      losartan (COZAAR) 25 MG tablet Take 1 tablet by mouth Daily.      multivitamin with minerals tablet tablet Take 1 tablet by mouth Daily.      omeprazole (priLOSEC) 40 MG capsule Take 1 capsule by mouth Daily. Indications: Gastroesophageal Reflux Disease      valACYclovir (VALTREX) 500 MG tablet Take 1 tablet by mouth Daily. prn      vitamin D3 125 MCG (5000 UT) capsule capsule Take 1 capsule by mouth Daily.       Allergies   Allergen Reactions    Lisinopril Cough       Social History     Socioeconomic History    Marital status:    Tobacco Use    Smoking status: Never    Smokeless tobacco: Never   Vaping Use    Vaping status: Never Used   Substance and Sexual Activity    Alcohol use: Yes     Comment: once a month, social    Drug use: No    Sexual activity: Yes     Partners: Male     Birth control/protection: Vasectomy, Coitus interruptus       Vitals:    24 0834   BP: 118/74   Pulse: 80   Temp: 98  °F (36.7 °C)     Weight 105 kg (230 lb 8 oz)  Body mass index is 37.2 kg/m².    Physical Exam  Constitutional:       General: She is not in acute distress.     Appearance: She is well-developed. She is not diaphoretic.   HENT:      Head: Normocephalic and atraumatic.      Mouth/Throat:      Pharynx: No oropharyngeal exudate.   Eyes:      Conjunctiva/sclera: Conjunctivae normal.      Pupils: Pupils are equal, round, and reactive to light.   Pulmonary:      Effort: Pulmonary effort is normal. No respiratory distress.   Abdominal:      General: There is no distension.      Palpations: Abdomen is soft.   Skin:     General: Skin is warm and dry.      Coloration: Skin is not pale.   Neurological:      Mental Status: She is alert and oriented to person, place, and time.      Cranial Nerves: No cranial nerve deficit.   Psychiatric:         Behavior: Behavior normal.         Thought Content: Thought content normal.           Assessment:      ICD-10-CM ICD-9-CM   1. Partial gastric outlet obstruction  K31.1 537.0   2. H. pylori infection  A04.8 041.86   3. Hiatal hernia with gastroesophageal reflux  K44.9 553.3    K21.9 530.81   4. Gastroesophageal reflux disease, unspecified whether esophagitis present  K21.9 530.81   5. Bile reflux gastritis  K29.60 535.40       Plan:      Rx Zofran for nausea.  Take Miralax up to QID.  Lower abdominal discomfort would not be expected to be related to foregut issues.  Suspect some esophageal spasm/dysmotility from the bile acid reflux.  Rx Questran.  Pt aware mike worsen constipation.  Recommend 100 g/day protein, soft diet.  Avoid meat/starches to decrease dysphagia.  Need repeat H. Pylori test.  Not able to do today b/c cannot tolerate holding PPI.  Try to reschedule once she is on Questran and has held PPI.     Will obtain PA for surgery she previously discussed with Dr. Arce and hopefully move up the timeline b/c symptoms are worsening.

## 2024-05-30 ENCOUNTER — PRIOR AUTHORIZATION (OUTPATIENT)
Dept: BARIATRICS/WEIGHT MGMT | Facility: CLINIC | Age: 29
End: 2024-05-30
Payer: COMMERCIAL

## 2024-06-26 DIAGNOSIS — R06.00 DYSPNEA, UNSPECIFIED TYPE: ICD-10-CM

## 2024-06-26 DIAGNOSIS — R53.83 FATIGUE, UNSPECIFIED TYPE: Primary | ICD-10-CM

## 2024-07-01 ENCOUNTER — LAB (OUTPATIENT)
Dept: LAB | Facility: HOSPITAL | Age: 29
End: 2024-07-01
Payer: COMMERCIAL

## 2024-07-01 DIAGNOSIS — R53.83 FATIGUE, UNSPECIFIED TYPE: ICD-10-CM

## 2024-07-01 DIAGNOSIS — R06.00 DYSPNEA, UNSPECIFIED TYPE: ICD-10-CM

## 2024-07-01 LAB
ALBUMIN SERPL-MCNC: 4.3 G/DL (ref 3.5–5.2)
ALBUMIN/GLOB SERPL: 1.4 G/DL
ALP SERPL-CCNC: 70 U/L (ref 39–117)
ALT SERPL W P-5'-P-CCNC: 18 U/L (ref 1–33)
ANION GAP SERPL CALCULATED.3IONS-SCNC: 9.8 MMOL/L (ref 5–15)
AST SERPL-CCNC: 18 U/L (ref 1–32)
BILIRUB SERPL-MCNC: <0.2 MG/DL (ref 0–1.2)
BUN SERPL-MCNC: 9 MG/DL (ref 6–20)
BUN/CREAT SERPL: 12.3 (ref 7–25)
CALCIUM SPEC-SCNC: 9.5 MG/DL (ref 8.6–10.5)
CHLORIDE SERPL-SCNC: 101 MMOL/L (ref 98–107)
CO2 SERPL-SCNC: 26.2 MMOL/L (ref 22–29)
CREAT SERPL-MCNC: 0.73 MG/DL (ref 0.57–1)
DEPRECATED RDW RBC AUTO: 38.8 FL (ref 37–54)
EGFRCR SERPLBLD CKD-EPI 2021: 113.6 ML/MIN/1.73
ERYTHROCYTE [DISTWIDTH] IN BLOOD BY AUTOMATED COUNT: 12.8 % (ref 12.3–15.4)
GLOBULIN UR ELPH-MCNC: 3.1 GM/DL
GLUCOSE SERPL-MCNC: 79 MG/DL (ref 65–99)
HCT VFR BLD AUTO: 40.9 % (ref 34–46.6)
HGB BLD-MCNC: 13.5 G/DL (ref 12–15.9)
MCH RBC QN AUTO: 27.7 PG (ref 26.6–33)
MCHC RBC AUTO-ENTMCNC: 33 G/DL (ref 31.5–35.7)
MCV RBC AUTO: 84 FL (ref 79–97)
PLATELET # BLD AUTO: 324 10*3/MM3 (ref 140–450)
PMV BLD AUTO: 10.2 FL (ref 6–12)
POTASSIUM SERPL-SCNC: 4 MMOL/L (ref 3.5–5.2)
PROT SERPL-MCNC: 7.4 G/DL (ref 6–8.5)
RBC # BLD AUTO: 4.87 10*6/MM3 (ref 3.77–5.28)
SODIUM SERPL-SCNC: 137 MMOL/L (ref 136–145)
WBC NRBC COR # BLD AUTO: 12.83 10*3/MM3 (ref 3.4–10.8)

## 2024-07-01 PROCEDURE — 36415 COLL VENOUS BLD VENIPUNCTURE: CPT

## 2024-07-01 PROCEDURE — 80053 COMPREHEN METABOLIC PANEL: CPT

## 2024-07-01 PROCEDURE — 85027 COMPLETE CBC AUTOMATED: CPT

## 2024-07-02 ENCOUNTER — CONSULT (OUTPATIENT)
Dept: BARIATRICS/WEIGHT MGMT | Facility: CLINIC | Age: 29
End: 2024-07-02
Payer: COMMERCIAL

## 2024-07-02 VITALS
OXYGEN SATURATION: 96 % | HEIGHT: 66 IN | HEART RATE: 75 BPM | BODY MASS INDEX: 36.96 KG/M2 | RESPIRATION RATE: 16 BRPM | DIASTOLIC BLOOD PRESSURE: 78 MMHG | TEMPERATURE: 97.5 F | SYSTOLIC BLOOD PRESSURE: 128 MMHG | WEIGHT: 230 LBS

## 2024-07-02 DIAGNOSIS — Z98.84 S/P LAPAROSCOPIC SLEEVE GASTRECTOMY: ICD-10-CM

## 2024-07-02 DIAGNOSIS — A04.8 H. PYLORI INFECTION: ICD-10-CM

## 2024-07-02 DIAGNOSIS — K21.9 HIATAL HERNIA WITH GASTROESOPHAGEAL REFLUX: Primary | ICD-10-CM

## 2024-07-02 DIAGNOSIS — K44.9 HIATAL HERNIA WITH GASTROESOPHAGEAL REFLUX: Primary | ICD-10-CM

## 2024-07-02 DIAGNOSIS — K29.60 BILE REFLUX GASTRITIS: ICD-10-CM

## 2024-07-02 PROCEDURE — 99214 OFFICE O/P EST MOD 30 MIN: CPT | Performed by: SURGERY

## 2024-07-02 RX ORDER — ONDANSETRON HYDROCHLORIDE 8 MG/1
TABLET, FILM COATED ORAL
COMMUNITY

## 2024-07-02 NOTE — LETTER
"2024     MAGO Vizcarra  [address]  [address]  [facility] [address]    Patient: Annie Kent   YOB: 1995   Date of Visit: 2024     Dear MAGO Vizcarra:       Thank you for referring Annie Kent to me for evaluation. Below are the relevant portions of my assessment and plan of care.    If you have questions, please do not hesitate to call me. I look forward to following Annie along with you.         Sincerely,        Rhett Arce MD        CC: No Recipients    Rhett Arce MD  24 1225  Sign when Signing Visit  [facility]IAT[facility]  [address]  [address]  [facility] [address] [phone]      Patient  Name:  Annie Kent  :  1995      Date of Visit: 24    Chief Complaint:   Hiatal hernia, chronic nausea, GE reflux, partial gastric outlet obstruction status post laparoscopic sleeve gastrectomy 2021 ( Dr. Bird, [facility]).       History of Present Illness:  Annie Kent is a 28 y.o. female who presents today for reevaluation, education and consultation regarding the above complaints.  My most recent evaluation dated 2024 reviewed:    \"Annie Kent is a 28 y.o. female who presents today for evaluation, education and consultation regarding her ongoing symptomatic partial gastric outlet obstruction and bile reflux gastritis status post laparoscopic sleeve gastrectomy in 2021 with Dr. Bird at [facility].  Most recent intake evaluation Lisa SANCHEZ PA-C dated 3/22/2024 reviewed.  She notes the patient was initially seen for intake in 2023\" it from that visit that the patient's maximum lifetime weight was 268 pounds which she weighed prior to her sleeve and her lowest achieved weight was 213 pounds and that she has good restriction but is snacking and grazing and gets hungry quickly and has had significant GERD since her sleeve on omeprazole 20 mg daily history of H. pylori " "infection treated with a Prevpac retested negative chronic nausea with occasional vomiting since her sleeve had a gallbladder removed as a teenager for gallstones has diabetes PCOS palpitations back pain history of MRSA and anxiety.  Lisa SANCHEZ PA-C notes that upper GI on 2/9/2024 shows a small hiatal hernia with spontaneous GE reflux.     28-year-old female from [facility].  She comes in today to discuss options to address her chronic postprandial nausea and reflux which began immediately after her sleeve gastrectomy in March 2021 (Dr. Bird, [facility] as above).  She said she followed up with him and discussed this and he \"blew her off\".  She was not aware he is since left the state.  She says that prior to her sleeve gastrectomy she did not have issues with chronic nausea and had minimal reflux symptoms.  Hiatal hernia repair was not performed at the time of her sleeve gastrectomy, I did review the operative report dated 3/18/2021.  The sleeve was done over 54 [place] bougie with an High Amana stapler and SEAMGUARD reinforcement and the staple line was reinforced with a running absorbable 2-0 Stratfix suture.  On upper endoscopy last month I noted a small hiatal hernia and an irregular shaped sleeve with a tight S shaped pattern appearing to cause a partial gastric outlet obstruction, there was some bile in the stomach.  Z-line roughly 37/38 cm.  On endoscopy the angulation occurred shortly below the GE junction, presumably related to the hiatal hernia and there were 2 additional angulations in the area of the mid sleeve and angularis prior to reaching the antrum.  Pathology of the antrum showed moderate chronic gastritis which was active with abundant bacterial forms compatible with H. pylori which was identified also on routine stains distal esophageal biopsies were suggestive but not diagnostic of reflux.  Upper GI results as above I did review the images.  She said she took unknown type treatment for H. pylori " in the past without change in her symptoms and tested negative and is currently on a Prevpac which she thinks is a different treatment.  She said she recently filled out paperwork to have me do her sleeve gastrectomy and is unsure how things transpired but she wound up having her sleeve gastrectomy at [facility] as above.  She takes occasional NSAIDs for joint pain and occasionally gets steroid injections in her left elbow for severe pain.  She said she bled significantly during her first pregnancy in 2012 and plans were to give her a blood transfusion but they never could get an IV.  She was 16 years old at the time.  She had twins with her second pregnancy in 2018.  She denies any personal or family history of clotting disorders and was not sent home on home anticoagulation after any of her prior surgeries.....    Assessment:     Annie Kent is a 28 y.o. year old female with symptomatic partial gastric outlet obstruction, bile reflux gastritis, hiatal hernia with GE reflux status post laparoscopic sleeve gastrectomy March 2021 Dr. Bird [facility].     I reviewed her Sarmad report which is negative.       We had a long discussion about how to best address her hiatal hernia and partial gastric outlet obstruction status post sleeve gastrectomy in March 2021.  Her symptoms started after her sleeve gastrectomy and I believe she has a technical issue with her sleeve causing partial gastric outlet obstruction.  I feel the best way to address this from a surgical/mechanical standpoint would be revision to a Justino-en-Y gastric bypass with or without remnant gastrectomy, the latter could be considered given her history of persistent H. pylori gastritis given that future endoscopy of her gastric remnant would be challenging if not impossible.  She is aware there is a risk of future cancer with persistent H. pylori gastritis and once again she is undergoing retreatment and plans to be retested.  If she remains  positive she could be treated with additional regimens and if persistently positive referred to GI.  I have not had much success with hiatal hernia repair and trying to straighten out or revise the sleeve and usually the patient ultimately needs to be converted to Justino-en-Y gastric bypass for relief of their partial gastric outlet obstruction symptoms.  I strongly encouraged her to seek second opinion(s).  She understands my concerns with Justino-en-Y gastric bypass include inability to visualize the gastric remnant as above as well as the potential for gastrojejunostomy ulceration with occasional exposure to NSAIDs and steroids.  She feels her elbow steroid injections are necessary as the pain is very severe.  She understands after gastric bypass this would put her at increased risk for gastrojejunostomy ulceration with potentially serious and even fatal consequences and that there is no guarantee that PPI prophylaxis will prevent this from occurring.  She also understands that we cannot predict which patients will develop gastrojejunostomy ulcerations with ulcerogenic agents.     Complications of laparoscopic/possible robotic revision of her previous sleeve gastrectomy to Justino-en-Y gastric bypass were discussed including but not limited to bleeding, infection, deep venous thrombosis, pulmonary embolism, pulmonary complications such as pneumonia, cardiac events, hernias, small bowel obstruction, damage to the spleen or other organs, bowel injury, disfiguring scars, failure to lose weight, need for additional surgery, conversion to an open procedure, and death.  Patient is also aware of complications which apply in this particular procedure that can include but are not limited to leaking of gastric contents at the staple or suture lines which could lead to intra-abdominal abscess, or chronic complications of strictures, ulcers, or vitamin/mineral deficiencies.     R/B/A Rx to hiatal hernia repair were discussed as  "outlined in our long consent form.  Briefly risks in addition to those for gastric bypass include recurrent hernia, CHELSEY, dysphagia, esophageal injury, pneumothorax, injury to the vagus nerves, injury to the thoracic duct, aorta or vena cava, diaphragmatic paralysis.     R/B/A Rx discussed to postop anticoagulation incl but not limited to bleeding, drug reaction, venothromboembolic events, etc. and the patient declined.      Plan:    After discussion of the risks, benefits, and alternative therapies as above to address her partial gastric outlet obstruction and hiatal hernia status post sleeve gastrectomy in March 2021 she wishes to proceed with revision of her sleeve gastrectomy to a Justino-en-Y gastric bypass, hiatal hernia repair, and EGD.  Once again this is not for weight loss but to address her mechanically abnormal sleeve with partial gastric outlet obstruction.  She is aware that this is likely a 3 or more hour operation with Singletary catheter, CATINA drain, etc.\"    She returns for final visit prior to scheduling surgery.  She attended informed consent last evening and says she has already discussed with everyone including the doctor she works with about avoiding corn syrup.  She admits to occasionally taking a hit from her friends vape pens when she is out, lasted so about a week ago.  Her  does vape in the house.  Her white count is 12.83 and she wonders if she may have some type of infection.  She does not feel 100% and got a little dizzy and thinks it may be her left ear and plans on going to urgent treatment care for evaluation later today.  She knows not to proceed with surgery if she is running a fever or having acute infection.  No real change to her medical or surgical history since I saw her in April other than the 1 visit with Dr. Wolfe for exacerbation of her current symptoms.  She has not retested for H. pylori and usually we hold PPI for a couple weeks so probably best to do this sometime in " the postoperative period when she is not having severe reflux symptoms and fully recovered from her bypass.  She denies personal or family history of bleeding or clotting disorders and no bleeding or clotting disorders with her previous surgeries or pregnancies and has never gone home on anticoagulation.  She says she continues to lose weight which she attributes to her current partial gastric outlet obstruction symptoms and thinks she has lost about 15 pounds since I originally saw her but she is actually lost 7-1/2 pounds since then but has maintained her weight since seen on 2024.    Past Medical History:   Diagnosis Date   • Anxiety    • Back pain     chiropractor; PRN nsaids   • Carpal tunnel syndrome, left    • Chronic nausea     chronic since LSG   • GERD (gastroesophageal reflux disease)     Omeprazole daily. EGD prior to LSG;   • H/O gastric sleeve     Dr. Bird 3/2021   • Hiatal hernia    • History of abnormal cervical Pap smear    • History of Helicobacter pylori infection     treated w/ Prevpak. Recently retested and was negative.  EGD Dr. Arce 3/24 - abundant h. pylori, PrevPak prescribed, re-test pending   • History of MRSA infection     skin boil at buttock as child   • History of postpartum hemorrhage    • History of pre-eclampsia 2018   • History of twin pregnancy in prior pregnancy    • Hypertension    • Joint pain    • Left lateral epicondylitis     cortisone injections and PT   • DAMIEN (obstructive sleep apnea)    • Palpitations     with high stress   • Polycystic ovarian disease    • PONV (postoperative nausea and vomiting)    • Postpartum spinal headache    • Prediabetes     per external records; controlled post-sleeve   • Tendonitis of elbow, left    • Vitamin B deficiency      Past Surgical History:   Procedure Laterality Date   •  SECTION      lower transverse   •  SECTION     • COLPOSCOPY W/ BIOPSY / CURETTAGE      negative, per patient   • D & C WITH  SUCTION  2010    EAB   • ENDOSCOPY  03/25/2024    with biopsies   • GASTRIC SLEEVE LAPAROSCOPIC  03/18/2021    Dr. Bird   • LAPAROSCOPIC CHOLECYSTECTOMY  2010    gallstones   • WISDOM TOOTH EXTRACTION  2015       Allergies   Allergen Reactions   • Lisinopril Cough       Current Outpatient Medications:   •  Calcium Carbonate Antacid (CALCIUM CARBONATE PO), Take  by mouth., Disp: , Rfl:   •  cetirizine (zyrTEC) 10 MG tablet, Take 1 tablet by mouth Daily., Disp: , Rfl:   •  cyanocobalamin 1000 MCG/ML injection, INJECT 1ML INTRAMUSCULARLY ONCE WEEKLY FOR FOUR WEEKS, THEN INJECT 1ML INTRAMUSCULARLY ONCE MONTHLY THEREAFTER, Disp: , Rfl:   •  losartan (COZAAR) 25 MG tablet, Take 1 tablet by mouth Daily., Disp: , Rfl:   •  multivitamin with minerals tablet tablet, Take 1 tablet by mouth Daily., Disp: , Rfl:   •  omeprazole (priLOSEC) 40 MG capsule, Take 1 capsule by mouth Daily. Indications: Gastroesophageal Reflux Disease, Disp: , Rfl:   •  omeprazole (priLOSEC) 40 MG capsule, Take 1 capsule by mouth Daily., Disp: 30 capsule, Rfl: 0  •  ondansetron (ZOFRAN) 8 MG tablet, Take  by mouth., Disp: , Rfl:   •  vitamin D3 125 MCG (5000 UT) capsule capsule, Take 1 capsule by mouth Daily., Disp: , Rfl:   •  cholestyramine (Questran) 4 GM/DOSE powder, Take 1 packet by mouth 3 (Three) Times a Day With Meals. (Patient not taking: Reported on 7/2/2024), Disp: 90 packet, Rfl: 11  •  valACYclovir (VALTREX) 500 MG tablet, Take 1 tablet by mouth Daily. prn (Patient not taking: Reported on 7/2/2024), Disp: , Rfl:     Social History     Socioeconomic History   • Marital status:    Tobacco Use   • Smoking status: Never   • Smokeless tobacco: Never   Vaping Use   • Vaping status: Never Used   Substance and Sexual Activity   • Alcohol use: Yes     Comment: once a month, social   • Drug use: No   • Sexual activity: Yes     Partners: Male     Birth control/protection: Vasectomy     Family History   Problem Relation Age of Onset   • Sleep  apnea Father    • Hypertension Mother    • No Known Problems Brother    • No Known Problems Sister    • Hypertension Paternal Grandfather    • Stroke Paternal Grandfather    • Hypertension Paternal Grandmother    • Heart disease Paternal Grandmother    • Heart attack Paternal Grandmother    • Hypertension Maternal Grandmother    • Hypertension Maternal Grandfather    • Diabetes Maternal Grandfather    • Pancreatic cancer Maternal Great-Grandfather    • Breast cancer Neg Hx    • Ovarian cancer Neg Hx    • Colon cancer Neg Hx    • Colon polyps Neg Hx        Review of Systems   Constitutional:  Positive for unexpected weight loss. Negative for fever.   Gastrointestinal:  Positive for abdominal pain, constipation, diarrhea, nausea, vomiting and GERD.   Genitourinary:  Positive for frequency. Negative for dysuria and hematuria.   Musculoskeletal:  Positive for arthralgias, back pain and myalgias.   Psychiatric/Behavioral:  The patient is nervous/anxious.        I have reviewed the ROS and confirm that it's accurate today.    Physical Exam:  Vital Signs:  Weight: 104 kg (230 lb)   Body mass index is 37.12 kg/m².  Temp: 97.5 °F (36.4 °C)   Heart Rate: 75   BP: 128/78     Physical Exam  Vitals reviewed.   Constitutional:       Appearance: She is well-developed.   HENT:      Head: Normocephalic and atraumatic.      Nose: Nose normal.      Comments: Nasal piercing  Eyes:      Conjunctiva/sclera: Conjunctivae normal.      Pupils: Pupils are equal, round, and reactive to light.   Neck:      Thyroid: No thyromegaly.      Vascular: No carotid bruit.      Trachea: No tracheal deviation.   Cardiovascular:      Rate and Rhythm: Normal rate and regular rhythm.      Heart sounds: Normal heart sounds.   Pulmonary:      Effort: Pulmonary effort is normal. No respiratory distress.      Breath sounds: Normal breath sounds.   Abdominal:      General: There is no distension.      Palpations: Abdomen is soft.      Tenderness: There is no  abdominal tenderness.      Comments: Laparoscopy scars, low transverse scar   Musculoskeletal:         General: No deformity. Normal range of motion.      Cervical back: Normal range of motion and neck supple.   Skin:     General: Skin is warm and dry.      Findings: No rash.      Comments: Scattered tattoos   Neurological:      Mental Status: She is alert and oriented to person, place, and time.      Cranial Nerves: No cranial nerve deficit.      Coordination: Coordination normal.   Psychiatric:         Behavior: Behavior normal.         Thought Content: Thought content normal.         Judgment: Judgment normal.         Patient Active Problem List   Diagnosis   • Polycystic ovarian disease   • Palpitations   • H/O gastric sleeve   • History of Helicobacter pylori infection   • Nausea   • GERD (gastroesophageal reflux disease)   • Back pain   • Prediabetes   • History of MRSA infection   • Fatigue   • Obesity, Class II, BMI 35-39.9   • Screening for cervical cancer   • Nexplanon removal   • Birth control counseling   • Irregular menses   • Insulin resistance   • H. pylori infection   • Partial gastric outlet obstruction       Assessment:    Annie Kent is a 28 y.o. year old female with symptomatic partial gastric outlet obstruction, bile reflux gastritis, hiatal hernia with GE reflux status post laparoscopic sleeve gastrectomy March 2021 Dr. Marge Bailey.     The patient was present for an approximately a 2.5 hour discussion of the purpose of MBS, how MBS is a tool to assist in achieving weight loss goals, the most common complications and how best to avoid them, and the strategies for short and long term weight loss and improvement in comorbidities.  Ample opportunity to discuss questions was available both in group and during the time of individual examination.    I reviewed her Sarmad report which is negative.  Please see scanned records that I have reviewed and signed off on today.      Complications of  laparoscopic/possible robotic revision of her previous sleeve gastrectomy to a Justino-en-Y gastric bypass were discussed including but not limited to bleeding, infection, deep venous thrombosis, pulmonary embolism, pulmonary complications such as pneumonia, cardiac events, hernias, small bowel obstruction, damage to the spleen or other organs, bowel injury, disfiguring scars, failure to lose weight, need for additional surgery, conversion to an open procedure, and death.  Patient is also aware of complications which apply in this particular procedure that can include but are not limited to leaking of gastric contents at the staple or suture lines which could lead to intra-abdominal abscess, or chronic complications of strictures, ulcers, or vitamin/mineral deficiencies.    R/B/A Rx to hiatal hernia repair were discussed as outlined in our long consent form.  Briefly risks in addition to those for gastric bypass include recurrent hernia, CHELSEY, dysphagia, esophageal injury, pneumothorax, injury to the vagus nerves, injury to the thoracic duct, aorta or vena cava.    Discussed the risks, benefits and alternative therapies at great length as outlined in our extensive consent forms, consent videos, and educational teaching process under the direction of the center's .    A copy of the patient's signed informed consent is on file.    R/B/A Rx discussed to postop anticoagulation incl but not limited to bleeding, drug reaction, venothromboembolic events, etc. and the patient declined.        Plan:      After reevaluation today I think the patient remains a reasonable candidate for laparoscopic possible robotic assisted revision of her sleeve gastrectomy to a Justino-en-Y gastric bypass, hiatal hernia repair, and EGD.  Once again this is not for weight loss but to address her mechanically abnormal sleeve with partial gastric outlet obstruction.  She is aware that this is likely a 3 or more hour operation with  Singletary catheter, CATINA drain, etc.  Plan to retest her for H. pylori a couple of months postoperatively as above.  Check her prealbumin preop.    Other issues include anxiety, chronic low back pain, carpal tunnel syndrome, H. pylori gastritis, history of MRSA, hypertension, joint pain, obstructive sleep apnea, palpitations, PCOS, prediabetes, vitamin B deficiency.    Thank you Flory MERCEDES for the opportunity to reevaluate Mrs. Kent.        Rhett Arce MD              Answers submitted by the patient for this visit:  Primary Reason for Visit (Submitted on 6/30/2024)  What is the primary reason for your visit?: Abdominal Pain  Abdominal Pain Questionnaire (Submitted on 6/30/2024)  Chief Complaint: Abdominal pain  Chronicity: chronic  Onset: more than 1 year ago  Onset quality: undetermined  Frequency: 2 to 4 times per day  Episode duration: 6 Hours  Progression since onset: coming and going  Pain location: epigastric region  Pain - numeric: 7/10  Pain quality: burning  Radiates to: chest  anorexia: Yes  belching: Yes  flatus: Yes  hematochezia: No  melena: No  Aggravated by: eating, palpation  Relieved by: belching, movement, passing flatus, sitting up, vomiting  Diagnostic workup: surgery, upper endoscopy

## 2024-07-02 NOTE — H&P (VIEW-ONLY)
"[facility] BARIATRIC SURGERY  [address]  [address]  [facility] [address]  [phone]      Patient  Name:  Annie Kent  :  1995      Date of Visit: 24    Chief Complaint:   Hiatal hernia, chronic nausea, GE reflux, partial gastric outlet obstruction status post laparoscopic sleeve gastrectomy 2021 ( [facility]).       History of Present Illness:  Annie Kent is a 28 y.o. female who presents today for reevaluation, education and consultation regarding the above complaints.  My most recent evaluation dated 2024 reviewed:    \"Annie Kent is a 28 y.o. female who presents today for evaluation, education and consultation regarding her ongoing symptomatic partial gastric outlet obstruction and bile reflux gastritis status post laparoscopic sleeve gastrectomy in 2021 with Dr. Bird at [facility].  Most recent intake evaluation Lisa SANCHEZ PA-C dated 3/22/2024 reviewed.  She notes the patient was initially seen for intake in 2023\" it from that visit that the patient's maximum lifetime weight was 268 pounds which she weighed prior to her sleeve and her lowest achieved weight was 213 pounds and that she has good restriction but is snacking and grazing and gets hungry quickly and has had significant GERD since her sleeve on omeprazole 20 mg daily history of H. pylori infection treated with a Prevpac retested negative chronic nausea with occasional vomiting since her sleeve had a gallbladder removed as a teenager for gallstones has diabetes PCOS palpitations back pain history of MRSA and anxiety.  Lisa SANCHEZ PA-C notes that upper GI on 2024 shows a small hiatal hernia with spontaneous GE reflux.     28-year-old female from [facility].  She comes in today to discuss options to address her chronic postprandial nausea and reflux which began immediately after her sleeve gastrectomy in 2021 ([facility] as above).  She said " "she followed up with him and discussed this and he \"blew her off\".  She was not aware he is since left the state.  She says that prior to her sleeve gastrectomy she did not have issues with chronic nausea and had minimal reflux symptoms.  Hiatal hernia repair was not performed at the time of her sleeve gastrectomy, I did review the operative report dated 3/18/2021.  The sleeve was done over 54 Hungarian bougie with an [place] stapler and SEAMGUARD reinforcement and the staple line was reinforced with a running absorbable 2-0 Stratfix suture.  On upper endoscopy last month I noted a small hiatal hernia and an irregular shaped sleeve with a tight S shaped pattern appearing to cause a partial gastric outlet obstruction, there was some bile in the stomach.  Z-line roughly 37/38 cm.  On endoscopy the angulation occurred shortly below the GE junction, presumably related to the hiatal hernia and there were 2 additional angulations in the area of the mid sleeve and angularis prior to reaching the antrum.  Pathology of the antrum showed moderate chronic gastritis which was active with abundant bacterial forms compatible with H. pylori which was identified also on routine stains distal esophageal biopsies were suggestive but not diagnostic of reflux.  Upper GI results as above I did review the images.  She said she took unknown type treatment for H. pylori in the past without change in her symptoms and tested negative and is currently on a Prevpac which she thinks is a different treatment.  She said she recently filled out paperwork to have me do her sleeve gastrectomy and is unsure how things transpired but she wound up having her sleeve gastrectomy at [facility] as above.  She takes occasional NSAIDs for joint pain and occasionally gets steroid injections in her left elbow for severe pain.  She said she bled significantly during her first pregnancy in 2012 and plans were to give her a blood transfusion but they never could " get an IV.  She was 16 years old at the time.  She had twins with her second pregnancy in 2018.  She denies any personal or family history of clotting disorders and was not sent home on home anticoagulation after any of her prior surgeries.....    Assessment:     Annie Kent is a 28 y.o. year old female with symptomatic partial gastric outlet obstruction, bile reflux gastritis, hiatal hernia with GE reflux status post laparoscopic sleeve gastrectomy March 2021 Dr. Marge Jalloh [facility].     I reviewed her Sarmad report which is negative.       We had a long discussion about how to best address her hiatal hernia and partial gastric outlet obstruction status post sleeve gastrectomy in March 2021.  Her symptoms started after her sleeve gastrectomy and I believe she has a technical issue with her sleeve causing partial gastric outlet obstruction.  I feel the best way to address this from a surgical/mechanical standpoint would be revision to a Justino-en-Y gastric bypass with or without remnant gastrectomy, the latter could be considered given her history of persistent H. pylori gastritis given that future endoscopy of her gastric remnant would be challenging if not impossible.  She is aware there is a risk of future cancer with persistent H. pylori gastritis and once again she is undergoing retreatment and plans to be retested.  If she remains positive she could be treated with additional regimens and if persistently positive referred to GI.  I have not had much success with hiatal hernia repair and trying to straighten out or revise the sleeve and usually the patient ultimately needs to be converted to Justino-en-Y gastric bypass for relief of their partial gastric outlet obstruction symptoms.  I strongly encouraged her to seek second opinion(s).  She understands my concerns with Justino-en-Y gastric bypass include inability to visualize the gastric remnant as above as well as the potential for gastrojejunostomy ulceration  with occasional exposure to NSAIDs and steroids.  She feels her elbow steroid injections are necessary as the pain is very severe.  She understands after gastric bypass this would put her at increased risk for gastrojejunostomy ulceration with potentially serious and even fatal consequences and that there is no guarantee that PPI prophylaxis will prevent this from occurring.  She also understands that we cannot predict which patients will develop gastrojejunostomy ulcerations with ulcerogenic agents.     Complications of laparoscopic/possible robotic revision of her previous sleeve gastrectomy to Justino-en-Y gastric bypass were discussed including but not limited to bleeding, infection, deep venous thrombosis, pulmonary embolism, pulmonary complications such as pneumonia, cardiac events, hernias, small bowel obstruction, damage to the spleen or other organs, bowel injury, disfiguring scars, failure to lose weight, need for additional surgery, conversion to an open procedure, and death.  Patient is also aware of complications which apply in this particular procedure that can include but are not limited to leaking of gastric contents at the staple or suture lines which could lead to intra-abdominal abscess, or chronic complications of strictures, ulcers, or vitamin/mineral deficiencies.     R/B/A Rx to hiatal hernia repair were discussed as outlined in our long consent form.  Briefly risks in addition to those for gastric bypass include recurrent hernia, CHELSEY, dysphagia, esophageal injury, pneumothorax, injury to the vagus nerves, injury to the thoracic duct, aorta or vena cava, diaphragmatic paralysis.     R/B/A Rx discussed to postop anticoagulation incl but not limited to bleeding, drug reaction, venothromboembolic events, etc. and the patient declined.      Plan:    After discussion of the risks, benefits, and alternative therapies as above to address her partial gastric outlet obstruction and hiatal hernia status  "post sleeve gastrectomy in March 2021 she wishes to proceed with revision of her sleeve gastrectomy to a Justino-en-Y gastric bypass, hiatal hernia repair, and EGD.  Once again this is not for weight loss but to address her mechanically abnormal sleeve with partial gastric outlet obstruction.  She is aware that this is likely a 3 or more hour operation with Singletary catheter, CATINA drain, etc.\"    She returns for final visit prior to scheduling surgery.  She attended informed consent last evening and says she has already discussed with everyone including the doctor she works with about avoiding corn syrup.  She admits to occasionally taking a hit from her friends vape pens when she is out, lasted so about a week ago.  Her  does vape in the house.  Her white count is 12.83 and she wonders if she may have some type of infection.  She does not feel 100% and got a little dizzy and thinks it may be her left ear and plans on going to urgent treatment care for evaluation later today.  She knows not to proceed with surgery if she is running a fever or having acute infection.  No real change to her medical or surgical history since I saw her in April other than the 1 visit with Dr. Wolfe for exacerbation of her current symptoms.  She has not retested for H. pylori and usually we hold PPI for a couple weeks so probably best to do this sometime in the postoperative period when she is not having severe reflux symptoms and fully recovered from her bypass.  She denies personal or family history of bleeding or clotting disorders and no bleeding or clotting disorders with her previous surgeries or pregnancies and has never gone home on anticoagulation.  She says she continues to lose weight which she attributes to her current partial gastric outlet obstruction symptoms and thinks she has lost about 15 pounds since I originally saw her but she is actually lost 7-1/2 pounds since then but has maintained her weight since seen on " 2024.    Past Medical History:   Diagnosis Date    Anxiety 2017    Back pain     chiropractor; PRN nsaids    Carpal tunnel syndrome, left     Chronic nausea     chronic since LSG    GERD (gastroesophageal reflux disease)     Omeprazole daily. EGD prior to LSG;    H/O gastric sleeve     Dr. Bird 3/2021    Hiatal hernia     History of abnormal cervical Pap smear     History of Helicobacter pylori infection     treated w/ Prevpak. Recently retested and was negative.  EGD Dr. Arce 3/24 - abundant h. pylori, PrevPak prescribed, re-test pending    History of MRSA infection     skin boil at buttock as child    History of postpartum hemorrhage     History of pre-eclampsia 2018    History of twin pregnancy in prior pregnancy     Hypertension     Joint pain     Left lateral epicondylitis     cortisone injections and PT    DAMIEN (obstructive sleep apnea)     Palpitations     with high stress    Polycystic ovarian disease     PONV (postoperative nausea and vomiting)     Postpartum spinal headache     Prediabetes     per external records; controlled post-sleeve    Tendonitis of elbow, left     Vitamin B deficiency      Past Surgical History:   Procedure Laterality Date     SECTION      lower transverse     SECTION      COLPOSCOPY W/ BIOPSY / CURETTAGE      negative, per patient    D & C WITH SUCTION      EAB    ENDOSCOPY  2024    with biopsies    GASTRIC SLEEVE LAPAROSCOPIC  2021    Dr. Bird    LAPAROSCOPIC CHOLECYSTECTOMY  2010    gallstones    WISDOM TOOTH EXTRACTION         Allergies   Allergen Reactions    Lisinopril Cough       Current Outpatient Medications:     Calcium Carbonate Antacid (CALCIUM CARBONATE PO), Take  by mouth., Disp: , Rfl:     cetirizine (zyrTEC) 10 MG tablet, Take 1 tablet by mouth Daily., Disp: , Rfl:     cyanocobalamin 1000 MCG/ML injection, INJECT 1ML INTRAMUSCULARLY ONCE WEEKLY FOR FOUR WEEKS, THEN INJECT 1ML INTRAMUSCULARLY ONCE MONTHLY  THEREAFTER, Disp: , Rfl:     losartan (COZAAR) 25 MG tablet, Take 1 tablet by mouth Daily., Disp: , Rfl:     multivitamin with minerals tablet tablet, Take 1 tablet by mouth Daily., Disp: , Rfl:     omeprazole (priLOSEC) 40 MG capsule, Take 1 capsule by mouth Daily. Indications: Gastroesophageal Reflux Disease, Disp: , Rfl:     omeprazole (priLOSEC) 40 MG capsule, Take 1 capsule by mouth Daily., Disp: 30 capsule, Rfl: 0    ondansetron (ZOFRAN) 8 MG tablet, Take  by mouth., Disp: , Rfl:     vitamin D3 125 MCG (5000 UT) capsule capsule, Take 1 capsule by mouth Daily., Disp: , Rfl:     cholestyramine (Questran) 4 GM/DOSE powder, Take 1 packet by mouth 3 (Three) Times a Day With Meals. (Patient not taking: Reported on 7/2/2024), Disp: 90 packet, Rfl: 11    valACYclovir (VALTREX) 500 MG tablet, Take 1 tablet by mouth Daily. prn (Patient not taking: Reported on 7/2/2024), Disp: , Rfl:     Social History     Socioeconomic History    Marital status:    Tobacco Use    Smoking status: Never    Smokeless tobacco: Never   Vaping Use    Vaping status: Never Used   Substance and Sexual Activity    Alcohol use: Yes     Comment: once a month, social    Drug use: No    Sexual activity: Yes     Partners: Male     Birth control/protection: Vasectomy     Family History   Problem Relation Age of Onset    Sleep apnea Father     Hypertension Mother     No Known Problems Brother     No Known Problems Sister     Hypertension Paternal Grandfather     Stroke Paternal Grandfather     Hypertension Paternal Grandmother     Heart disease Paternal Grandmother     Heart attack Paternal Grandmother     Hypertension Maternal Grandmother     Hypertension Maternal Grandfather     Diabetes Maternal Grandfather     Pancreatic cancer Maternal Great-Grandfather     Breast cancer Neg Hx     Ovarian cancer Neg Hx     Colon cancer Neg Hx     Colon polyps Neg Hx        Review of Systems   Constitutional:  Positive for unexpected weight loss. Negative  for fever.   Gastrointestinal:  Positive for abdominal pain, constipation, diarrhea, nausea, vomiting and GERD.   Genitourinary:  Positive for frequency. Negative for dysuria and hematuria.   Musculoskeletal:  Positive for arthralgias, back pain and myalgias.   Psychiatric/Behavioral:  The patient is nervous/anxious.        I have reviewed the ROS and confirm that it's accurate today.    Physical Exam:  Vital Signs:  Weight: 104 kg (230 lb)   Body mass index is 37.12 kg/m².  Temp: 97.5 °F (36.4 °C)   Heart Rate: 75   BP: 128/78     Physical Exam  Vitals reviewed.   Constitutional:       Appearance: She is well-developed.   HENT:      Head: Normocephalic and atraumatic.      Nose: Nose normal.      Comments: Nasal piercing  Eyes:      Conjunctiva/sclera: Conjunctivae normal.      Pupils: Pupils are equal, round, and reactive to light.   Neck:      Thyroid: No thyromegaly.      Vascular: No carotid bruit.      Trachea: No tracheal deviation.   Cardiovascular:      Rate and Rhythm: Normal rate and regular rhythm.      Heart sounds: Normal heart sounds.   Pulmonary:      Effort: Pulmonary effort is normal. No respiratory distress.      Breath sounds: Normal breath sounds.   Abdominal:      General: There is no distension.      Palpations: Abdomen is soft.      Tenderness: There is no abdominal tenderness.      Comments: Laparoscopy scars, low transverse scar   Musculoskeletal:         General: No deformity. Normal range of motion.      Cervical back: Normal range of motion and neck supple.   Skin:     General: Skin is warm and dry.      Findings: No rash.      Comments: Scattered tattoos   Neurological:      Mental Status: She is alert and oriented to person, place, and time.      Cranial Nerves: No cranial nerve deficit.      Coordination: Coordination normal.   Psychiatric:         Behavior: Behavior normal.         Thought Content: Thought content normal.         Judgment: Judgment normal.         Patient Active  Problem List   Diagnosis    Polycystic ovarian disease    Palpitations    H/O gastric sleeve    History of Helicobacter pylori infection    Nausea    GERD (gastroesophageal reflux disease)    Back pain    Prediabetes    History of MRSA infection    Fatigue    Obesity, Class II, BMI 35-39.9    Screening for cervical cancer    Nexplanon removal    Birth control counseling    Irregular menses    Insulin resistance    H. pylori infection    Partial gastric outlet obstruction       Assessment:    Annie Kent is a 28 y.o. year old female with symptomatic partial gastric outlet obstruction, bile reflux gastritis, hiatal hernia with GE reflux status post laparoscopic sleeve gastrectomy March 2021 Dr. Marge Bailey.     The patient was present for an approximately a 2.5 hour discussion of the purpose of MBS, how MBS is a tool to assist in achieving weight loss goals, the most common complications and how best to avoid them, and the strategies for short and long term weight loss and improvement in comorbidities.  Ample opportunity to discuss questions was available both in group and during the time of individual examination.    I reviewed her Sarmad report which is negative.  Please see scanned records that I have reviewed and signed off on today.      Complications of laparoscopic/possible robotic revision of her previous sleeve gastrectomy to a Justino-en-Y gastric bypass were discussed including but not limited to bleeding, infection, deep venous thrombosis, pulmonary embolism, pulmonary complications such as pneumonia, cardiac events, hernias, small bowel obstruction, damage to the spleen or other organs, bowel injury, disfiguring scars, failure to lose weight, need for additional surgery, conversion to an open procedure, and death.  Patient is also aware of complications which apply in this particular procedure that can include but are not limited to leaking of gastric contents at the staple or suture lines which could  lead to intra-abdominal abscess, or chronic complications of strictures, ulcers, or vitamin/mineral deficiencies.    R/B/A Rx to hiatal hernia repair were discussed as outlined in our long consent form.  Briefly risks in addition to those for gastric bypass include recurrent hernia, CHELSEY, dysphagia, esophageal injury, pneumothorax, injury to the vagus nerves, injury to the thoracic duct, aorta or vena cava.    Discussed the risks, benefits and alternative therapies at great length as outlined in our extensive consent forms, consent videos, and educational teaching process under the direction of the center's .    A copy of the patient's signed informed consent is on file.    R/B/A Rx discussed to postop anticoagulation incl but not limited to bleeding, drug reaction, venothromboembolic events, etc. and the patient declined.        Plan:      After reevaluation today I think the patient remains a reasonable candidate for laparoscopic possible robotic assisted revision of her sleeve gastrectomy to a Justino-en-Y gastric bypass, hiatal hernia repair, and EGD.  Once again this is not for weight loss but to address her mechanically abnormal sleeve with partial gastric outlet obstruction.  She is aware that this is likely a 3 or more hour operation with Singletary catheter, CATINA drain, etc.  Plan to retest her for H. pylori a couple of months postoperatively as above.  Check her prealbumin preop.    Other issues include anxiety, chronic low back pain, carpal tunnel syndrome, H. pylori gastritis, history of MRSA, hypertension, joint pain, obstructive sleep apnea, palpitations, PCOS, prediabetes, vitamin B deficiency.    Thank you Flory MERCEDES for the opportunity to reevaluate Mrs. Kent.        Rhett Arce MD              Answers submitted by the patient for this visit:  Primary Reason for Visit (Submitted on 6/30/2024)  What is the primary reason for your visit?: Abdominal Pain  Abdominal Pain Questionnaire  (Submitted on 6/30/2024)  Chief Complaint: Abdominal pain  Chronicity: chronic  Onset: more than 1 year ago  Onset quality: undetermined  Frequency: 2 to 4 times per day  Episode duration: 6 Hours  Progression since onset: coming and going  Pain location: epigastric region  Pain - numeric: 7/10  Pain quality: burning  Radiates to: chest  anorexia: Yes  belching: Yes  flatus: Yes  hematochezia: No  melena: No  Aggravated by: eating, palpation  Relieved by: belching, movement, passing flatus, sitting up, vomiting  Diagnostic workup: surgery, upper endoscopy

## 2024-07-02 NOTE — PROGRESS NOTES
"[facility] BARIATRIC SURGERY  [address]  [address]  [facility] [address]  [phone]      Patient  Name:  Annie Kent  :  1995      Date of Visit: 24    Chief Complaint:   Hiatal hernia, chronic nausea, GE reflux, partial gastric outlet obstruction status post laparoscopic sleeve gastrectomy 2021 ( [facility]).       History of Present Illness:  Annie Kent is a 28 y.o. female who presents today for reevaluation, education and consultation regarding the above complaints.  My most recent evaluation dated 2024 reviewed:    \"Annie Kent is a 28 y.o. female who presents today for evaluation, education and consultation regarding her ongoing symptomatic partial gastric outlet obstruction and bile reflux gastritis status post laparoscopic sleeve gastrectomy in 2021 with Dr. Bird at [facility].  Most recent intake evaluation Lisa SANCHEZ PA-C dated 3/22/2024 reviewed.  She notes the patient was initially seen for intake in 2023\" it from that visit that the patient's maximum lifetime weight was 268 pounds which she weighed prior to her sleeve and her lowest achieved weight was 213 pounds and that she has good restriction but is snacking and grazing and gets hungry quickly and has had significant GERD since her sleeve on omeprazole 20 mg daily history of H. pylori infection treated with a Prevpac retested negative chronic nausea with occasional vomiting since her sleeve had a gallbladder removed as a teenager for gallstones has diabetes PCOS palpitations back pain history of MRSA and anxiety.  Lisa SANCHEZ PA-C notes that upper GI on 2024 shows a small hiatal hernia with spontaneous GE reflux.     28-year-old female from [facility].  She comes in today to discuss options to address her chronic postprandial nausea and reflux which began immediately after her sleeve gastrectomy in 2021 ([facility] as above).  She said " "she followed up with him and discussed this and he \"blew her off\".  She was not aware he is since left the state.  She says that prior to her sleeve gastrectomy she did not have issues with chronic nausea and had minimal reflux symptoms.  Hiatal hernia repair was not performed at the time of her sleeve gastrectomy, I did review the operative report dated 3/18/2021.  The sleeve was done over 54 Romanian bougie with an [place] stapler and SEAMGUARD reinforcement and the staple line was reinforced with a running absorbable 2-0 Stratfix suture.  On upper endoscopy last month I noted a small hiatal hernia and an irregular shaped sleeve with a tight S shaped pattern appearing to cause a partial gastric outlet obstruction, there was some bile in the stomach.  Z-line roughly 37/38 cm.  On endoscopy the angulation occurred shortly below the GE junction, presumably related to the hiatal hernia and there were 2 additional angulations in the area of the mid sleeve and angularis prior to reaching the antrum.  Pathology of the antrum showed moderate chronic gastritis which was active with abundant bacterial forms compatible with H. pylori which was identified also on routine stains distal esophageal biopsies were suggestive but not diagnostic of reflux.  Upper GI results as above I did review the images.  She said she took unknown type treatment for H. pylori in the past without change in her symptoms and tested negative and is currently on a Prevpac which she thinks is a different treatment.  She said she recently filled out paperwork to have me do her sleeve gastrectomy and is unsure how things transpired but she wound up having her sleeve gastrectomy at [facility] as above.  She takes occasional NSAIDs for joint pain and occasionally gets steroid injections in her left elbow for severe pain.  She said she bled significantly during her first pregnancy in 2012 and plans were to give her a blood transfusion but they never could " get an IV.  She was 16 years old at the time.  She had twins with her second pregnancy in 2018.  She denies any personal or family history of clotting disorders and was not sent home on home anticoagulation after any of her prior surgeries.....    Assessment:     Annie Kent is a 28 y.o. year old female with symptomatic partial gastric outlet obstruction, bile reflux gastritis, hiatal hernia with GE reflux status post laparoscopic sleeve gastrectomy March 2021 Dr. Marge Jalloh [facility].     I reviewed her Sarmad report which is negative.       We had a long discussion about how to best address her hiatal hernia and partial gastric outlet obstruction status post sleeve gastrectomy in March 2021.  Her symptoms started after her sleeve gastrectomy and I believe she has a technical issue with her sleeve causing partial gastric outlet obstruction.  I feel the best way to address this from a surgical/mechanical standpoint would be revision to a Justino-en-Y gastric bypass with or without remnant gastrectomy, the latter could be considered given her history of persistent H. pylori gastritis given that future endoscopy of her gastric remnant would be challenging if not impossible.  She is aware there is a risk of future cancer with persistent H. pylori gastritis and once again she is undergoing retreatment and plans to be retested.  If she remains positive she could be treated with additional regimens and if persistently positive referred to GI.  I have not had much success with hiatal hernia repair and trying to straighten out or revise the sleeve and usually the patient ultimately needs to be converted to Justino-en-Y gastric bypass for relief of their partial gastric outlet obstruction symptoms.  I strongly encouraged her to seek second opinion(s).  She understands my concerns with Justino-en-Y gastric bypass include inability to visualize the gastric remnant as above as well as the potential for gastrojejunostomy ulceration  with occasional exposure to NSAIDs and steroids.  She feels her elbow steroid injections are necessary as the pain is very severe.  She understands after gastric bypass this would put her at increased risk for gastrojejunostomy ulceration with potentially serious and even fatal consequences and that there is no guarantee that PPI prophylaxis will prevent this from occurring.  She also understands that we cannot predict which patients will develop gastrojejunostomy ulcerations with ulcerogenic agents.     Complications of laparoscopic/possible robotic revision of her previous sleeve gastrectomy to Justino-en-Y gastric bypass were discussed including but not limited to bleeding, infection, deep venous thrombosis, pulmonary embolism, pulmonary complications such as pneumonia, cardiac events, hernias, small bowel obstruction, damage to the spleen or other organs, bowel injury, disfiguring scars, failure to lose weight, need for additional surgery, conversion to an open procedure, and death.  Patient is also aware of complications which apply in this particular procedure that can include but are not limited to leaking of gastric contents at the staple or suture lines which could lead to intra-abdominal abscess, or chronic complications of strictures, ulcers, or vitamin/mineral deficiencies.     R/B/A Rx to hiatal hernia repair were discussed as outlined in our long consent form.  Briefly risks in addition to those for gastric bypass include recurrent hernia, CHELSEY, dysphagia, esophageal injury, pneumothorax, injury to the vagus nerves, injury to the thoracic duct, aorta or vena cava, diaphragmatic paralysis.     R/B/A Rx discussed to postop anticoagulation incl but not limited to bleeding, drug reaction, venothromboembolic events, etc. and the patient declined.      Plan:    After discussion of the risks, benefits, and alternative therapies as above to address her partial gastric outlet obstruction and hiatal hernia status  "post sleeve gastrectomy in March 2021 she wishes to proceed with revision of her sleeve gastrectomy to a Justino-en-Y gastric bypass, hiatal hernia repair, and EGD.  Once again this is not for weight loss but to address her mechanically abnormal sleeve with partial gastric outlet obstruction.  She is aware that this is likely a 3 or more hour operation with Singletary catheter, CATINA drain, etc.\"    She returns for final visit prior to scheduling surgery.  She attended informed consent last evening and says she has already discussed with everyone including the doctor she works with about avoiding corn syrup.  She admits to occasionally taking a hit from her friends vape pens when she is out, lasted so about a week ago.  Her  does vape in the house.  Her white count is 12.83 and she wonders if she may have some type of infection.  She does not feel 100% and got a little dizzy and thinks it may be her left ear and plans on going to urgent treatment care for evaluation later today.  She knows not to proceed with surgery if she is running a fever or having acute infection.  No real change to her medical or surgical history since I saw her in April other than the 1 visit with Dr. Wolfe for exacerbation of her current symptoms.  She has not retested for H. pylori and usually we hold PPI for a couple weeks so probably best to do this sometime in the postoperative period when she is not having severe reflux symptoms and fully recovered from her bypass.  She denies personal or family history of bleeding or clotting disorders and no bleeding or clotting disorders with her previous surgeries or pregnancies and has never gone home on anticoagulation.  She says she continues to lose weight which she attributes to her current partial gastric outlet obstruction symptoms and thinks she has lost about 15 pounds since I originally saw her but she is actually lost 7-1/2 pounds since then but has maintained her weight since seen on " 2024.    Past Medical History:   Diagnosis Date    Anxiety 2017    Back pain     chiropractor; PRN nsaids    Carpal tunnel syndrome, left     Chronic nausea     chronic since LSG    GERD (gastroesophageal reflux disease)     Omeprazole daily. EGD prior to LSG;    H/O gastric sleeve     Dr. Bird 3/2021    Hiatal hernia     History of abnormal cervical Pap smear     History of Helicobacter pylori infection     treated w/ Prevpak. Recently retested and was negative.  EGD Dr. Arce 3/24 - abundant h. pylori, PrevPak prescribed, re-test pending    History of MRSA infection     skin boil at buttock as child    History of postpartum hemorrhage     History of pre-eclampsia 2018    History of twin pregnancy in prior pregnancy     Hypertension     Joint pain     Left lateral epicondylitis     cortisone injections and PT    DAMIEN (obstructive sleep apnea)     Palpitations     with high stress    Polycystic ovarian disease     PONV (postoperative nausea and vomiting)     Postpartum spinal headache     Prediabetes     per external records; controlled post-sleeve    Tendonitis of elbow, left     Vitamin B deficiency      Past Surgical History:   Procedure Laterality Date     SECTION      lower transverse     SECTION      COLPOSCOPY W/ BIOPSY / CURETTAGE      negative, per patient    D & C WITH SUCTION      EAB    ENDOSCOPY  2024    with biopsies    GASTRIC SLEEVE LAPAROSCOPIC  2021    Dr. Bird    LAPAROSCOPIC CHOLECYSTECTOMY  2010    gallstones    WISDOM TOOTH EXTRACTION         Allergies   Allergen Reactions    Lisinopril Cough       Current Outpatient Medications:     Calcium Carbonate Antacid (CALCIUM CARBONATE PO), Take  by mouth., Disp: , Rfl:     cetirizine (zyrTEC) 10 MG tablet, Take 1 tablet by mouth Daily., Disp: , Rfl:     cyanocobalamin 1000 MCG/ML injection, INJECT 1ML INTRAMUSCULARLY ONCE WEEKLY FOR FOUR WEEKS, THEN INJECT 1ML INTRAMUSCULARLY ONCE MONTHLY  THEREAFTER, Disp: , Rfl:     losartan (COZAAR) 25 MG tablet, Take 1 tablet by mouth Daily., Disp: , Rfl:     multivitamin with minerals tablet tablet, Take 1 tablet by mouth Daily., Disp: , Rfl:     omeprazole (priLOSEC) 40 MG capsule, Take 1 capsule by mouth Daily. Indications: Gastroesophageal Reflux Disease, Disp: , Rfl:     omeprazole (priLOSEC) 40 MG capsule, Take 1 capsule by mouth Daily., Disp: 30 capsule, Rfl: 0    ondansetron (ZOFRAN) 8 MG tablet, Take  by mouth., Disp: , Rfl:     vitamin D3 125 MCG (5000 UT) capsule capsule, Take 1 capsule by mouth Daily., Disp: , Rfl:     cholestyramine (Questran) 4 GM/DOSE powder, Take 1 packet by mouth 3 (Three) Times a Day With Meals. (Patient not taking: Reported on 7/2/2024), Disp: 90 packet, Rfl: 11    valACYclovir (VALTREX) 500 MG tablet, Take 1 tablet by mouth Daily. prn (Patient not taking: Reported on 7/2/2024), Disp: , Rfl:     Social History     Socioeconomic History    Marital status:    Tobacco Use    Smoking status: Never    Smokeless tobacco: Never   Vaping Use    Vaping status: Never Used   Substance and Sexual Activity    Alcohol use: Yes     Comment: once a month, social    Drug use: No    Sexual activity: Yes     Partners: Male     Birth control/protection: Vasectomy     Family History   Problem Relation Age of Onset    Sleep apnea Father     Hypertension Mother     No Known Problems Brother     No Known Problems Sister     Hypertension Paternal Grandfather     Stroke Paternal Grandfather     Hypertension Paternal Grandmother     Heart disease Paternal Grandmother     Heart attack Paternal Grandmother     Hypertension Maternal Grandmother     Hypertension Maternal Grandfather     Diabetes Maternal Grandfather     Pancreatic cancer Maternal Great-Grandfather     Breast cancer Neg Hx     Ovarian cancer Neg Hx     Colon cancer Neg Hx     Colon polyps Neg Hx        Review of Systems   Constitutional:  Positive for unexpected weight loss. Negative  for fever.   Gastrointestinal:  Positive for abdominal pain, constipation, diarrhea, nausea, vomiting and GERD.   Genitourinary:  Positive for frequency. Negative for dysuria and hematuria.   Musculoskeletal:  Positive for arthralgias, back pain and myalgias.   Psychiatric/Behavioral:  The patient is nervous/anxious.        I have reviewed the ROS and confirm that it's accurate today.    Physical Exam:  Vital Signs:  Weight: 104 kg (230 lb)   Body mass index is 37.12 kg/m².  Temp: 97.5 °F (36.4 °C)   Heart Rate: 75   BP: 128/78     Physical Exam  Vitals reviewed.   Constitutional:       Appearance: She is well-developed.   HENT:      Head: Normocephalic and atraumatic.      Nose: Nose normal.      Comments: Nasal piercing  Eyes:      Conjunctiva/sclera: Conjunctivae normal.      Pupils: Pupils are equal, round, and reactive to light.   Neck:      Thyroid: No thyromegaly.      Vascular: No carotid bruit.      Trachea: No tracheal deviation.   Cardiovascular:      Rate and Rhythm: Normal rate and regular rhythm.      Heart sounds: Normal heart sounds.   Pulmonary:      Effort: Pulmonary effort is normal. No respiratory distress.      Breath sounds: Normal breath sounds.   Abdominal:      General: There is no distension.      Palpations: Abdomen is soft.      Tenderness: There is no abdominal tenderness.      Comments: Laparoscopy scars, low transverse scar   Musculoskeletal:         General: No deformity. Normal range of motion.      Cervical back: Normal range of motion and neck supple.   Skin:     General: Skin is warm and dry.      Findings: No rash.      Comments: Scattered tattoos   Neurological:      Mental Status: She is alert and oriented to person, place, and time.      Cranial Nerves: No cranial nerve deficit.      Coordination: Coordination normal.   Psychiatric:         Behavior: Behavior normal.         Thought Content: Thought content normal.         Judgment: Judgment normal.         Patient Active  Problem List   Diagnosis    Polycystic ovarian disease    Palpitations    H/O gastric sleeve    History of Helicobacter pylori infection    Nausea    GERD (gastroesophageal reflux disease)    Back pain    Prediabetes    History of MRSA infection    Fatigue    Obesity, Class II, BMI 35-39.9    Screening for cervical cancer    Nexplanon removal    Birth control counseling    Irregular menses    Insulin resistance    H. pylori infection    Partial gastric outlet obstruction       Assessment:    Annie Kent is a 28 y.o. year old female with symptomatic partial gastric outlet obstruction, bile reflux gastritis, hiatal hernia with GE reflux status post laparoscopic sleeve gastrectomy March 2021 Dr. Marge Bailey.     The patient was present for an approximately a 2.5 hour discussion of the purpose of MBS, how MBS is a tool to assist in achieving weight loss goals, the most common complications and how best to avoid them, and the strategies for short and long term weight loss and improvement in comorbidities.  Ample opportunity to discuss questions was available both in group and during the time of individual examination.    I reviewed her Sarmad report which is negative.  Please see scanned records that I have reviewed and signed off on today.      Complications of laparoscopic/possible robotic revision of her previous sleeve gastrectomy to a Justino-en-Y gastric bypass were discussed including but not limited to bleeding, infection, deep venous thrombosis, pulmonary embolism, pulmonary complications such as pneumonia, cardiac events, hernias, small bowel obstruction, damage to the spleen or other organs, bowel injury, disfiguring scars, failure to lose weight, need for additional surgery, conversion to an open procedure, and death.  Patient is also aware of complications which apply in this particular procedure that can include but are not limited to leaking of gastric contents at the staple or suture lines which could  lead to intra-abdominal abscess, or chronic complications of strictures, ulcers, or vitamin/mineral deficiencies.    R/B/A Rx to hiatal hernia repair were discussed as outlined in our long consent form.  Briefly risks in addition to those for gastric bypass include recurrent hernia, CHELSEY, dysphagia, esophageal injury, pneumothorax, injury to the vagus nerves, injury to the thoracic duct, aorta or vena cava.    Discussed the risks, benefits and alternative therapies at great length as outlined in our extensive consent forms, consent videos, and educational teaching process under the direction of the center's .    A copy of the patient's signed informed consent is on file.    R/B/A Rx discussed to postop anticoagulation incl but not limited to bleeding, drug reaction, venothromboembolic events, etc. and the patient declined.        Plan:      After reevaluation today I think the patient remains a reasonable candidate for laparoscopic possible robotic assisted revision of her sleeve gastrectomy to a Justino-en-Y gastric bypass, hiatal hernia repair, and EGD.  Once again this is not for weight loss but to address her mechanically abnormal sleeve with partial gastric outlet obstruction.  She is aware that this is likely a 3 or more hour operation with Singletary catheter, CATINA drain, etc.  Plan to retest her for H. pylori a couple of months postoperatively as above.  Check her prealbumin preop.    Other issues include anxiety, chronic low back pain, carpal tunnel syndrome, H. pylori gastritis, history of MRSA, hypertension, joint pain, obstructive sleep apnea, palpitations, PCOS, prediabetes, vitamin B deficiency.    Thank you Flory MERCEDES for the opportunity to reevaluate Mrs. Kent.        Rhett Arce MD              Answers submitted by the patient for this visit:  Primary Reason for Visit (Submitted on 6/30/2024)  What is the primary reason for your visit?: Abdominal Pain  Abdominal Pain Questionnaire  (Submitted on 6/30/2024)  Chief Complaint: Abdominal pain  Chronicity: chronic  Onset: more than 1 year ago  Onset quality: undetermined  Frequency: 2 to 4 times per day  Episode duration: 6 Hours  Progression since onset: coming and going  Pain location: epigastric region  Pain - numeric: 7/10  Pain quality: burning  Radiates to: chest  anorexia: Yes  belching: Yes  flatus: Yes  hematochezia: No  melena: No  Aggravated by: eating, palpation  Relieved by: belching, movement, passing flatus, sitting up, vomiting  Diagnostic workup: surgery, upper endoscopy

## 2024-07-03 PROBLEM — K21.9 HIATAL HERNIA WITH GASTROESOPHAGEAL REFLUX: Status: ACTIVE | Noted: 2024-04-30

## 2024-07-03 PROBLEM — K44.9 HIATAL HERNIA WITH GASTROESOPHAGEAL REFLUX: Status: ACTIVE | Noted: 2024-04-30

## 2024-07-03 PROBLEM — Z98.84 S/P LAPAROSCOPIC SLEEVE GASTRECTOMY: Status: ACTIVE | Noted: 2024-04-30

## 2024-07-05 ENCOUNTER — PRE-ADMISSION TESTING (OUTPATIENT)
Dept: PREADMISSION TESTING | Facility: HOSPITAL | Age: 29
End: 2024-07-05
Payer: COMMERCIAL

## 2024-07-05 ENCOUNTER — DOCUMENTATION (OUTPATIENT)
Dept: BARIATRICS/WEIGHT MGMT | Facility: CLINIC | Age: 29
End: 2024-07-05
Payer: COMMERCIAL

## 2024-07-05 DIAGNOSIS — Z98.84 S/P LAPAROSCOPIC SLEEVE GASTRECTOMY: ICD-10-CM

## 2024-07-05 DIAGNOSIS — K21.9 HIATAL HERNIA WITH GASTROESOPHAGEAL REFLUX: ICD-10-CM

## 2024-07-05 DIAGNOSIS — K44.9 HIATAL HERNIA WITH GASTROESOPHAGEAL REFLUX: ICD-10-CM

## 2024-07-05 DIAGNOSIS — Z01.89 LABORATORY TEST: Primary | ICD-10-CM

## 2024-07-05 DIAGNOSIS — K31.1 PARTIAL GASTRIC OUTLET OBSTRUCTION: ICD-10-CM

## 2024-07-05 LAB
ABO GROUP BLD: NORMAL
DEPRECATED RDW RBC AUTO: 38.7 FL (ref 37–54)
ERYTHROCYTE [DISTWIDTH] IN BLOOD BY AUTOMATED COUNT: 12.7 % (ref 12.3–15.4)
HBA1C MFR BLD: 5.1 % (ref 4.8–5.6)
HCT VFR BLD AUTO: 41.2 % (ref 34–46.6)
HGB BLD-MCNC: 13.6 G/DL (ref 12–15.9)
MCH RBC QN AUTO: 27.9 PG (ref 26.6–33)
MCHC RBC AUTO-ENTMCNC: 33 G/DL (ref 31.5–35.7)
MCV RBC AUTO: 84.6 FL (ref 79–97)
PLATELET # BLD AUTO: 275 10*3/MM3 (ref 140–450)
PMV BLD AUTO: 9.7 FL (ref 6–12)
POTASSIUM SERPL-SCNC: 4 MMOL/L (ref 3.5–5.2)
RBC # BLD AUTO: 4.87 10*6/MM3 (ref 3.77–5.28)
RH BLD: POSITIVE
WBC NRBC COR # BLD AUTO: 8.68 10*3/MM3 (ref 3.4–10.8)

## 2024-07-05 PROCEDURE — 86900 BLOOD TYPING SEROLOGIC ABO: CPT

## 2024-07-05 PROCEDURE — 85027 COMPLETE CBC AUTOMATED: CPT

## 2024-07-05 PROCEDURE — 84132 ASSAY OF SERUM POTASSIUM: CPT

## 2024-07-05 PROCEDURE — 36415 COLL VENOUS BLD VENIPUNCTURE: CPT

## 2024-07-05 PROCEDURE — 83036 HEMOGLOBIN GLYCOSYLATED A1C: CPT

## 2024-07-05 PROCEDURE — 87081 CULTURE SCREEN ONLY: CPT

## 2024-07-05 PROCEDURE — 86901 BLOOD TYPING SEROLOGIC RH(D): CPT

## 2024-07-05 NOTE — PAT
Kelin Spangler PA-C notified that procedure schedule needs to be clarified.     Will newed to sign permit in prreop after clarification     An arrival time for procedure was not provided during PAT visit. If patient had any questions or concerns about their arrival time, they were instructed to contact their surgeon/physician.  Additionally, if the patient referred to an arrival time that was acquired from their my chart account, patient was encouraged to verify that time with their surgeon/physician. Arrival times are NOT provided in Pre Admission Testing Department.     Patient denies any current skin issues.      Per Anesthesia Request, patient instructed not to take their ACE/ARB medications on the AM of surgery.     Patient instructed to drink 20 ounces of Gatorade or Gatorlyte (if diabetic) and it needs to be completed 1 hour (for Main OR patients) or 2 hours (scheduled  section & [facility] patients) before given arrival time for procedure (NO RED Gatorade and NO Gatorade Zero).    Patient verbalized understanding.     Patient to apply Chlorhexadine wipes  to surgical area (as instructed) the night before procedure and the AM of procedure. Wipes provided.

## 2024-07-06 LAB — MRSA SPEC QL CULT: NORMAL

## 2024-07-11 ENCOUNTER — ANESTHESIA EVENT (OUTPATIENT)
Dept: PERIOP | Facility: HOSPITAL | Age: 29
End: 2024-07-11
Payer: COMMERCIAL

## 2024-07-11 RX ORDER — SODIUM CHLORIDE 0.9 % (FLUSH) 0.9 %
10 SYRINGE (ML) INJECTION AS NEEDED
Status: CANCELLED | OUTPATIENT
Start: 2024-07-11

## 2024-07-11 RX ORDER — SODIUM CHLORIDE 9 MG/ML
40 INJECTION, SOLUTION INTRAVENOUS AS NEEDED
Status: CANCELLED | OUTPATIENT
Start: 2024-07-11

## 2024-07-11 RX ORDER — FAMOTIDINE 20 MG/1
20 TABLET, FILM COATED ORAL ONCE
Status: CANCELLED | OUTPATIENT
Start: 2024-07-11 | End: 2024-07-11

## 2024-07-11 RX ORDER — FAMOTIDINE 10 MG/ML
20 INJECTION, SOLUTION INTRAVENOUS ONCE
Status: CANCELLED | OUTPATIENT
Start: 2024-07-11 | End: 2024-07-11

## 2024-07-11 RX ORDER — SODIUM CHLORIDE, SODIUM LACTATE, POTASSIUM CHLORIDE, CALCIUM CHLORIDE 600; 310; 30; 20 MG/100ML; MG/100ML; MG/100ML; MG/100ML
9 INJECTION, SOLUTION INTRAVENOUS CONTINUOUS
Status: CANCELLED | OUTPATIENT
Start: 2024-07-11

## 2024-07-11 RX ORDER — SODIUM CHLORIDE 0.9 % (FLUSH) 0.9 %
10 SYRINGE (ML) INJECTION EVERY 12 HOURS SCHEDULED
Status: CANCELLED | OUTPATIENT
Start: 2024-07-11

## 2024-07-12 ENCOUNTER — HOSPITAL ENCOUNTER (INPATIENT)
Facility: HOSPITAL | Age: 29
LOS: 2 days | Discharge: HOME | End: 2024-07-14
Attending: SURGERY | Admitting: SURGERY
Payer: COMMERCIAL

## 2024-07-12 ENCOUNTER — ANESTHESIA (OUTPATIENT)
Dept: PERIOP | Facility: HOSPITAL | Age: 29
End: 2024-07-12
Payer: COMMERCIAL

## 2024-07-12 ENCOUNTER — ANESTHESIA EVENT CONVERTED (OUTPATIENT)
Dept: ANESTHESIOLOGY | Facility: HOSPITAL | Age: 29
End: 2024-07-12
Payer: COMMERCIAL

## 2024-07-12 DIAGNOSIS — K44.9 HIATAL HERNIA WITH GASTROESOPHAGEAL REFLUX: ICD-10-CM

## 2024-07-12 DIAGNOSIS — Z98.84 S/P LAPAROSCOPIC SLEEVE GASTRECTOMY: ICD-10-CM

## 2024-07-12 DIAGNOSIS — K21.9 HIATAL HERNIA WITH GASTROESOPHAGEAL REFLUX: ICD-10-CM

## 2024-07-12 DIAGNOSIS — K31.1 PARTIAL GASTRIC OUTLET OBSTRUCTION: ICD-10-CM

## 2024-07-12 LAB
ABO GROUP BLD: NORMAL
BLD GP AB SCN SERPL QL: NEGATIVE
HCT VFR BLD AUTO: 30.9 % (ref 34–46.6)
HGB BLD-MCNC: 10.2 G/DL (ref 12–15.9)
HGB BLD-MCNC: 10.2 G/DL (ref 12–15.9)
RH BLD: POSITIVE
T&S EXPIRATION DATE: NORMAL

## 2024-07-12 PROCEDURE — 25010000002 FENTANYL CITRATE (PF) 100 MCG/2ML SOLUTION: Performed by: NURSE ANESTHETIST, CERTIFIED REGISTERED

## 2024-07-12 PROCEDURE — 25010000002 ONDANSETRON PER 1 MG: Performed by: NURSE ANESTHETIST, CERTIFIED REGISTERED

## 2024-07-12 PROCEDURE — 25810000003 SODIUM CHLORIDE 0.9 % SOLUTION: Performed by: PHYSICIAN ASSISTANT

## 2024-07-12 PROCEDURE — 25010000002 DROPERIDOL PER 5 MG

## 2024-07-12 PROCEDURE — 43644 LAP GASTRIC BYPASS/ROUX-EN-Y: CPT | Performed by: SURGERY

## 2024-07-12 PROCEDURE — 25010000002 BUPIVACAINE (PF) 0.5 % SOLUTION 30 ML VIAL: Performed by: SURGERY

## 2024-07-12 PROCEDURE — 25010000002 LABETALOL 5 MG/ML SOLUTION: Performed by: NURSE ANESTHETIST, CERTIFIED REGISTERED

## 2024-07-12 PROCEDURE — 25010000002 DEXAMETHASONE SODIUM PHOSPHATE 10 MG/ML SOLUTION: Performed by: NURSE ANESTHETIST, CERTIFIED REGISTERED

## 2024-07-12 PROCEDURE — 85018 HEMOGLOBIN: CPT | Performed by: PHYSICIAN ASSISTANT

## 2024-07-12 PROCEDURE — 0DNU4ZZ RELEASE OMENTUM, PERCUTANEOUS ENDOSCOPIC APPROACH: ICD-10-PCS | Performed by: SURGERY

## 2024-07-12 PROCEDURE — 25010000002 DEXAMETHASONE PER 1 MG: Performed by: NURSE ANESTHETIST, CERTIFIED REGISTERED

## 2024-07-12 PROCEDURE — 25010000002 FENTANYL CITRATE (PF) 50 MCG/ML SOLUTION

## 2024-07-12 PROCEDURE — 86900 BLOOD TYPING SEROLOGIC ABO: CPT | Performed by: SURGERY

## 2024-07-12 PROCEDURE — 25010000002 THIAMINE HCL 200 MG/2ML SOLUTION: Performed by: SURGERY

## 2024-07-12 PROCEDURE — 25810000003 LACTATED RINGERS PER 1000 ML: Performed by: SURGERY

## 2024-07-12 PROCEDURE — 25010000002 ESMOLOL 100 MG/10ML SOLUTION: Performed by: NURSE ANESTHETIST, CERTIFIED REGISTERED

## 2024-07-12 PROCEDURE — 94761 N-INVAS EAR/PLS OXIMETRY MLT: CPT

## 2024-07-12 PROCEDURE — 25010000002 EPINEPHRINE PER 0.1 MG: Performed by: SURGERY

## 2024-07-12 PROCEDURE — 85018 HEMOGLOBIN: CPT | Performed by: SURGERY

## 2024-07-12 PROCEDURE — 0D1A4ZA BYPASS JEJUNUM TO JEJUNUM, PERCUTANEOUS ENDOSCOPIC APPROACH: ICD-10-PCS | Performed by: SURGERY

## 2024-07-12 PROCEDURE — 63710000001 ONDANSETRON ODT 4 MG TABLET DISPERSIBLE: Performed by: SURGERY

## 2024-07-12 PROCEDURE — 86923 COMPATIBILITY TEST ELECTRIC: CPT

## 2024-07-12 PROCEDURE — 43644 LAP GASTRIC BYPASS/ROUX-EN-Y: CPT | Performed by: PHYSICIAN ASSISTANT

## 2024-07-12 PROCEDURE — 94799 UNLISTED PULMONARY SVC/PX: CPT

## 2024-07-12 PROCEDURE — 25010000002 PROPOFOL 10 MG/ML EMULSION: Performed by: NURSE ANESTHETIST, CERTIFIED REGISTERED

## 2024-07-12 PROCEDURE — 25810000003 LACTATED RINGERS SOLUTION: Performed by: SURGERY

## 2024-07-12 PROCEDURE — 25010000002 ENOXAPARIN PER 10 MG: Performed by: SURGERY

## 2024-07-12 PROCEDURE — 86901 BLOOD TYPING SEROLOGIC RH(D): CPT | Performed by: SURGERY

## 2024-07-12 PROCEDURE — 25010000002 CEFAZOLIN PER 500 MG: Performed by: SURGERY

## 2024-07-12 PROCEDURE — 8E0W4CZ ROBOTIC ASSISTED PROCEDURE OF TRUNK REGION, PERCUTANEOUS ENDOSCOPIC APPROACH: ICD-10-PCS | Performed by: SURGERY

## 2024-07-12 PROCEDURE — 25010000002 SUGAMMADEX 500 MG/5ML SOLUTION: Performed by: NURSE ANESTHETIST, CERTIFIED REGISTERED

## 2024-07-12 PROCEDURE — 86850 RBC ANTIBODY SCREEN: CPT | Performed by: SURGERY

## 2024-07-12 PROCEDURE — 0DJ08ZZ INSPECTION OF UPPER INTESTINAL TRACT, VIA NATURAL OR ARTIFICIAL OPENING ENDOSCOPIC: ICD-10-PCS | Performed by: SURGERY

## 2024-07-12 PROCEDURE — 25010000002 BUPIVACAINE (PF) 0.25 % SOLUTION: Performed by: NURSE ANESTHETIST, CERTIFIED REGISTERED

## 2024-07-12 PROCEDURE — 0D164ZA BYPASS STOMACH TO JEJUNUM, PERCUTANEOUS ENDOSCOPIC APPROACH: ICD-10-PCS | Performed by: SURGERY

## 2024-07-12 PROCEDURE — 85014 HEMATOCRIT: CPT | Performed by: PHYSICIAN ASSISTANT

## 2024-07-12 PROCEDURE — 25010000002 HYDROMORPHONE 1 MG/ML SOLUTION: Performed by: SURGERY

## 2024-07-12 DEVICE — STAPLER 60 RELOAD BLUE
Type: IMPLANTABLE DEVICE | Site: ABDOMEN | Status: FUNCTIONAL
Brand: SUREFORM

## 2024-07-12 DEVICE — PBT NON ABSORBABLE WOUND CLOSURE DEVICE
Type: IMPLANTABLE DEVICE | Site: ABDOMEN | Status: FUNCTIONAL
Brand: V-LOC

## 2024-07-12 DEVICE — DEV CONTRL TISS STRATAFIX SPIRAL PDS PLS 3/0 0 15CM VIL: Type: IMPLANTABLE DEVICE | Site: ABDOMEN | Status: FUNCTIONAL

## 2024-07-12 DEVICE — STAPLER 60 RELOAD GREEN
Type: IMPLANTABLE DEVICE | Site: ABDOMEN | Status: FUNCTIONAL
Brand: SUREFORM

## 2024-07-12 DEVICE — STAPLER 60 RELOAD WHITE
Type: IMPLANTABLE DEVICE | Site: ABDOMEN | Status: FUNCTIONAL
Brand: SUREFORM

## 2024-07-12 RX ORDER — MAGNESIUM HYDROXIDE 1200 MG/15ML
LIQUID ORAL AS NEEDED
Status: DISCONTINUED | OUTPATIENT
Start: 2024-07-12 | End: 2024-07-12 | Stop reason: HOSPADM

## 2024-07-12 RX ORDER — HYDRALAZINE HYDROCHLORIDE 20 MG/ML
10 INJECTION INTRAMUSCULAR; INTRAVENOUS
Status: DISCONTINUED | OUTPATIENT
Start: 2024-07-12 | End: 2024-07-14 | Stop reason: HOSPADM

## 2024-07-12 RX ORDER — DEXAMETHASONE SODIUM PHOSPHATE 10 MG/ML
INJECTION, SOLUTION INTRAMUSCULAR; INTRAVENOUS
Status: COMPLETED | OUTPATIENT
Start: 2024-07-12 | End: 2024-07-12

## 2024-07-12 RX ORDER — ALPRAZOLAM 0.25 MG/1
0.25 TABLET ORAL ONCE AS NEEDED
Status: DISCONTINUED | OUTPATIENT
Start: 2024-07-12 | End: 2024-07-14 | Stop reason: HOSPADM

## 2024-07-12 RX ORDER — LABETALOL HYDROCHLORIDE 5 MG/ML
INJECTION, SOLUTION INTRAVENOUS AS NEEDED
Status: DISCONTINUED | OUTPATIENT
Start: 2024-07-12 | End: 2024-07-12 | Stop reason: SURG

## 2024-07-12 RX ORDER — SODIUM CHLORIDE, SODIUM LACTATE, POTASSIUM CHLORIDE, CALCIUM CHLORIDE 600; 310; 30; 20 MG/100ML; MG/100ML; MG/100ML; MG/100ML
150 INJECTION, SOLUTION INTRAVENOUS CONTINUOUS
Status: DISCONTINUED | OUTPATIENT
Start: 2024-07-12 | End: 2024-07-14 | Stop reason: HOSPADM

## 2024-07-12 RX ORDER — LABETALOL HYDROCHLORIDE 5 MG/ML
10 INJECTION, SOLUTION INTRAVENOUS
Status: DISCONTINUED | OUTPATIENT
Start: 2024-07-12 | End: 2024-07-14 | Stop reason: HOSPADM

## 2024-07-12 RX ORDER — DROPERIDOL 2.5 MG/ML
0.62 INJECTION, SOLUTION INTRAMUSCULAR; INTRAVENOUS ONCE AS NEEDED
Status: DISCONTINUED | OUTPATIENT
Start: 2024-07-12 | End: 2024-07-12 | Stop reason: SDUPTHER

## 2024-07-12 RX ORDER — CETIRIZINE HYDROCHLORIDE 10 MG/1
10 TABLET ORAL DAILY
Status: DISCONTINUED | OUTPATIENT
Start: 2024-07-12 | End: 2024-07-14 | Stop reason: HOSPADM

## 2024-07-12 RX ORDER — FENTANYL CITRATE 50 UG/ML
INJECTION, SOLUTION INTRAMUSCULAR; INTRAVENOUS AS NEEDED
Status: DISCONTINUED | OUTPATIENT
Start: 2024-07-12 | End: 2024-07-12 | Stop reason: SURG

## 2024-07-12 RX ORDER — ACETAMINOPHEN 500 MG
1000 TABLET ORAL ONCE
Status: COMPLETED | OUTPATIENT
Start: 2024-07-12 | End: 2024-07-12

## 2024-07-12 RX ORDER — ENOXAPARIN SODIUM 100 MG/ML
40 INJECTION SUBCUTANEOUS ONCE
Status: DISCONTINUED | OUTPATIENT
Start: 2024-07-12 | End: 2024-07-12 | Stop reason: HOSPADM

## 2024-07-12 RX ORDER — CYANOCOBALAMIN 1000 UG/ML
1000 INJECTION, SOLUTION INTRAMUSCULAR; SUBCUTANEOUS ONCE
Status: COMPLETED | OUTPATIENT
Start: 2024-07-13 | End: 2024-07-13

## 2024-07-12 RX ORDER — PANTOPRAZOLE SODIUM 40 MG/10ML
40 INJECTION, POWDER, LYOPHILIZED, FOR SOLUTION INTRAVENOUS
Status: DISCONTINUED | OUTPATIENT
Start: 2024-07-13 | End: 2024-07-14 | Stop reason: HOSPADM

## 2024-07-12 RX ORDER — SODIUM CHLORIDE 0.9 % (FLUSH) 0.9 %
3-10 SYRINGE (ML) INJECTION AS NEEDED
Status: DISCONTINUED | OUTPATIENT
Start: 2024-07-12 | End: 2024-07-12 | Stop reason: HOSPADM

## 2024-07-12 RX ORDER — ONDANSETRON 4 MG/1
4 TABLET, ORALLY DISINTEGRATING ORAL EVERY 6 HOURS PRN
Status: DISCONTINUED | OUTPATIENT
Start: 2024-07-16 | End: 2024-07-14 | Stop reason: HOSPADM

## 2024-07-12 RX ORDER — LABETALOL HYDROCHLORIDE 5 MG/ML
5 INJECTION, SOLUTION INTRAVENOUS
Status: DISCONTINUED | OUTPATIENT
Start: 2024-07-12 | End: 2024-07-12 | Stop reason: HOSPADM

## 2024-07-12 RX ORDER — LABETALOL HYDROCHLORIDE 5 MG/ML
INJECTION, SOLUTION INTRAVENOUS
Status: COMPLETED
Start: 2024-07-12 | End: 2024-07-12

## 2024-07-12 RX ORDER — MIDAZOLAM HYDROCHLORIDE 1 MG/ML
1 INJECTION INTRAMUSCULAR; INTRAVENOUS
Status: DISCONTINUED | OUTPATIENT
Start: 2024-07-12 | End: 2024-07-12 | Stop reason: HOSPADM

## 2024-07-12 RX ORDER — HYDROMORPHONE HYDROCHLORIDE 1 MG/ML
0.5 INJECTION, SOLUTION INTRAMUSCULAR; INTRAVENOUS; SUBCUTANEOUS
Status: DISCONTINUED | OUTPATIENT
Start: 2024-07-12 | End: 2024-07-12 | Stop reason: HOSPADM

## 2024-07-12 RX ORDER — ACETAMINOPHEN 500 MG
1000 TABLET ORAL EVERY 8 HOURS SCHEDULED
Status: COMPLETED | OUTPATIENT
Start: 2024-07-12 | End: 2024-07-14

## 2024-07-12 RX ORDER — OXYCODONE HYDROCHLORIDE 5 MG/1
TABLET ORAL
Status: COMPLETED
Start: 2024-07-12 | End: 2024-07-12

## 2024-07-12 RX ORDER — SODIUM CHLORIDE 9 MG/ML
40 INJECTION, SOLUTION INTRAVENOUS AS NEEDED
Status: DISCONTINUED | OUTPATIENT
Start: 2024-07-12 | End: 2024-07-12 | Stop reason: HOSPADM

## 2024-07-12 RX ORDER — IPRATROPIUM BROMIDE AND ALBUTEROL SULFATE 2.5; .5 MG/3ML; MG/3ML
3 SOLUTION RESPIRATORY (INHALATION) ONCE AS NEEDED
Status: DISCONTINUED | OUTPATIENT
Start: 2024-07-12 | End: 2024-07-12 | Stop reason: HOSPADM

## 2024-07-12 RX ORDER — ENOXAPARIN SODIUM 100 MG/ML
INJECTION SUBCUTANEOUS AS NEEDED
Status: DISCONTINUED | OUTPATIENT
Start: 2024-07-12 | End: 2024-07-12 | Stop reason: HOSPADM

## 2024-07-12 RX ORDER — LIDOCAINE HYDROCHLORIDE 10 MG/ML
0.5 INJECTION, SOLUTION EPIDURAL; INFILTRATION; INTRACAUDAL; PERINEURAL ONCE AS NEEDED
Status: DISCONTINUED | OUTPATIENT
Start: 2024-07-12 | End: 2024-07-12 | Stop reason: HOSPADM

## 2024-07-12 RX ORDER — SCOLOPAMINE TRANSDERMAL SYSTEM 1 MG/1
1 PATCH, EXTENDED RELEASE TRANSDERMAL ONCE
Status: DISCONTINUED | OUTPATIENT
Start: 2024-07-12 | End: 2024-07-12

## 2024-07-12 RX ORDER — CHLORHEXIDINE GLUCONATE ORAL RINSE 1.2 MG/ML
30 SOLUTION DENTAL
Status: COMPLETED | OUTPATIENT
Start: 2024-07-12 | End: 2024-07-12

## 2024-07-12 RX ORDER — ONDANSETRON 4 MG/1
4 TABLET, ORALLY DISINTEGRATING ORAL EVERY 4 HOURS PRN
Status: DISCONTINUED | OUTPATIENT
Start: 2024-07-12 | End: 2024-07-14 | Stop reason: HOSPADM

## 2024-07-12 RX ORDER — PROPOFOL 10 MG/ML
VIAL (ML) INTRAVENOUS AS NEEDED
Status: DISCONTINUED | OUTPATIENT
Start: 2024-07-12 | End: 2024-07-12 | Stop reason: SURG

## 2024-07-12 RX ORDER — OXYCODONE HYDROCHLORIDE 5 MG/1
5 TABLET ORAL EVERY 6 HOURS PRN
Status: DISCONTINUED | OUTPATIENT
Start: 2024-07-12 | End: 2024-07-14 | Stop reason: HOSPADM

## 2024-07-12 RX ORDER — GABAPENTIN 300 MG/1
600 CAPSULE ORAL ONCE
Status: COMPLETED | OUTPATIENT
Start: 2024-07-12 | End: 2024-07-12

## 2024-07-12 RX ORDER — SIMETHICONE 80 MG
80 TABLET,CHEWABLE ORAL 4 TIMES DAILY PRN
Status: DISCONTINUED | OUTPATIENT
Start: 2024-07-12 | End: 2024-07-14 | Stop reason: HOSPADM

## 2024-07-12 RX ORDER — FENTANYL CITRATE 50 UG/ML
50 INJECTION, SOLUTION INTRAMUSCULAR; INTRAVENOUS
Status: DISCONTINUED | OUTPATIENT
Start: 2024-07-12 | End: 2024-07-12 | Stop reason: HOSPADM

## 2024-07-12 RX ORDER — THIAMINE HYDROCHLORIDE 100 MG/ML
100 INJECTION, SOLUTION INTRAMUSCULAR; INTRAVENOUS ONCE
Qty: 2 ML | Refills: 0 | Status: COMPLETED | OUTPATIENT
Start: 2024-07-12 | End: 2024-07-12

## 2024-07-12 RX ORDER — HYDROMORPHONE HYDROCHLORIDE 1 MG/ML
0.5 INJECTION, SOLUTION INTRAMUSCULAR; INTRAVENOUS; SUBCUTANEOUS
Status: DISCONTINUED | OUTPATIENT
Start: 2024-07-12 | End: 2024-07-12 | Stop reason: SDUPTHER

## 2024-07-12 RX ORDER — MEPERIDINE HYDROCHLORIDE 25 MG/ML
12.5 INJECTION INTRAMUSCULAR; INTRAVENOUS; SUBCUTANEOUS
Status: DISCONTINUED | OUTPATIENT
Start: 2024-07-12 | End: 2024-07-12 | Stop reason: HOSPADM

## 2024-07-12 RX ORDER — PROMETHAZINE HYDROCHLORIDE 12.5 MG/1
12.5 TABLET ORAL EVERY 6 HOURS PRN
Status: DISCONTINUED | OUTPATIENT
Start: 2024-07-12 | End: 2024-07-14 | Stop reason: HOSPADM

## 2024-07-12 RX ORDER — ENOXAPARIN SODIUM 100 MG/ML
40 INJECTION SUBCUTANEOUS DAILY
Status: DISCONTINUED | OUTPATIENT
Start: 2024-07-13 | End: 2024-07-13

## 2024-07-12 RX ORDER — HYDROMORPHONE HYDROCHLORIDE 2 MG/1
2 TABLET ORAL EVERY 4 HOURS PRN
Status: DISCONTINUED | OUTPATIENT
Start: 2024-07-12 | End: 2024-07-14 | Stop reason: HOSPADM

## 2024-07-12 RX ORDER — ACETAMINOPHEN 160 MG/5ML
1000 SOLUTION ORAL EVERY 8 HOURS SCHEDULED
Status: COMPLETED | OUTPATIENT
Start: 2024-07-12 | End: 2024-07-14

## 2024-07-12 RX ORDER — DROPERIDOL 2.5 MG/ML
INJECTION, SOLUTION INTRAMUSCULAR; INTRAVENOUS
Status: COMPLETED
Start: 2024-07-12 | End: 2024-07-12

## 2024-07-12 RX ORDER — DROPERIDOL 2.5 MG/ML
0.62 INJECTION, SOLUTION INTRAMUSCULAR; INTRAVENOUS ONCE AS NEEDED
Status: COMPLETED | OUTPATIENT
Start: 2024-07-12 | End: 2024-07-12

## 2024-07-12 RX ORDER — GABAPENTIN 250 MG/5ML
100 SOLUTION ORAL 3 TIMES DAILY
Status: COMPLETED | OUTPATIENT
Start: 2024-07-12 | End: 2024-07-14

## 2024-07-12 RX ORDER — PANTOPRAZOLE SODIUM 40 MG/10ML
40 INJECTION, POWDER, LYOPHILIZED, FOR SOLUTION INTRAVENOUS ONCE
Status: COMPLETED | OUTPATIENT
Start: 2024-07-12 | End: 2024-07-12

## 2024-07-12 RX ORDER — FENTANYL CITRATE 50 UG/ML
INJECTION, SOLUTION INTRAMUSCULAR; INTRAVENOUS
Status: COMPLETED
Start: 2024-07-12 | End: 2024-07-12

## 2024-07-12 RX ORDER — ALBUTEROL SULFATE 2.5 MG/3ML
2.5 SOLUTION RESPIRATORY (INHALATION) EVERY 4 HOURS PRN
Status: DISCONTINUED | OUTPATIENT
Start: 2024-07-12 | End: 2024-07-14 | Stop reason: HOSPADM

## 2024-07-12 RX ORDER — TRANEXAMIC ACID 10 MG/ML
1000 INJECTION, SOLUTION INTRAVENOUS ONCE
Status: COMPLETED | OUTPATIENT
Start: 2024-07-12 | End: 2024-07-12

## 2024-07-12 RX ORDER — HYDRALAZINE HYDROCHLORIDE 20 MG/ML
5 INJECTION INTRAMUSCULAR; INTRAVENOUS
Status: DISCONTINUED | OUTPATIENT
Start: 2024-07-12 | End: 2024-07-12 | Stop reason: HOSPADM

## 2024-07-12 RX ORDER — ONDANSETRON 2 MG/ML
4 INJECTION INTRAMUSCULAR; INTRAVENOUS EVERY 4 HOURS PRN
Status: DISCONTINUED | OUTPATIENT
Start: 2024-07-12 | End: 2024-07-14 | Stop reason: HOSPADM

## 2024-07-12 RX ORDER — LORAZEPAM 1 MG/1
1 TABLET ORAL EVERY 12 HOURS PRN
Status: DISCONTINUED | OUTPATIENT
Start: 2024-07-12 | End: 2024-07-14 | Stop reason: HOSPADM

## 2024-07-12 RX ORDER — LOSARTAN POTASSIUM 25 MG/1
25 TABLET ORAL DAILY
Status: DISCONTINUED | OUTPATIENT
Start: 2024-07-12 | End: 2024-07-14 | Stop reason: HOSPADM

## 2024-07-12 RX ORDER — GABAPENTIN 100 MG/1
100 CAPSULE ORAL 3 TIMES DAILY
Status: COMPLETED | OUTPATIENT
Start: 2024-07-12 | End: 2024-07-14

## 2024-07-12 RX ORDER — NALOXONE HCL 0.4 MG/ML
0.4 VIAL (ML) INJECTION
Status: DISCONTINUED | OUTPATIENT
Start: 2024-07-12 | End: 2024-07-14 | Stop reason: HOSPADM

## 2024-07-12 RX ORDER — LIDOCAINE HYDROCHLORIDE 10 MG/ML
INJECTION, SOLUTION EPIDURAL; INFILTRATION; INTRACAUDAL; PERINEURAL AS NEEDED
Status: DISCONTINUED | OUTPATIENT
Start: 2024-07-12 | End: 2024-07-12 | Stop reason: SURG

## 2024-07-12 RX ORDER — FENTANYL CITRATE 50 UG/ML
50 INJECTION, SOLUTION INTRAMUSCULAR; INTRAVENOUS
Status: DISCONTINUED | OUTPATIENT
Start: 2024-07-12 | End: 2024-07-12 | Stop reason: SDUPTHER

## 2024-07-12 RX ORDER — DIPHENHYDRAMINE HYDROCHLORIDE 50 MG/ML
25 INJECTION INTRAMUSCULAR; INTRAVENOUS EVERY 4 HOURS PRN
Status: DISCONTINUED | OUTPATIENT
Start: 2024-07-12 | End: 2024-07-14 | Stop reason: HOSPADM

## 2024-07-12 RX ORDER — PROCHLORPERAZINE MALEATE 10 MG
10 TABLET ORAL EVERY 6 HOURS PRN
Status: DISCONTINUED | OUTPATIENT
Start: 2024-07-12 | End: 2024-07-14 | Stop reason: HOSPADM

## 2024-07-12 RX ORDER — ONDANSETRON 2 MG/ML
INJECTION INTRAMUSCULAR; INTRAVENOUS AS NEEDED
Status: DISCONTINUED | OUTPATIENT
Start: 2024-07-12 | End: 2024-07-12 | Stop reason: SURG

## 2024-07-12 RX ORDER — SODIUM CHLORIDE AND POTASSIUM CHLORIDE 150; 450 MG/100ML; MG/100ML
125 INJECTION, SOLUTION INTRAVENOUS CONTINUOUS
Status: DISCONTINUED | OUTPATIENT
Start: 2024-07-13 | End: 2024-07-14 | Stop reason: HOSPADM

## 2024-07-12 RX ORDER — NALOXONE HCL 0.4 MG/ML
0.1 VIAL (ML) INJECTION
Status: DISCONTINUED | OUTPATIENT
Start: 2024-07-12 | End: 2024-07-14 | Stop reason: HOSPADM

## 2024-07-12 RX ORDER — BUPIVACAINE HYDROCHLORIDE 2.5 MG/ML
INJECTION, SOLUTION EPIDURAL; INFILTRATION; INTRACAUDAL
Status: COMPLETED | OUTPATIENT
Start: 2024-07-12 | End: 2024-07-12

## 2024-07-12 RX ORDER — MORPHINE SULFATE 4 MG/ML
4 INJECTION, SOLUTION INTRAMUSCULAR; INTRAVENOUS
Status: DISCONTINUED | OUTPATIENT
Start: 2024-07-12 | End: 2024-07-14 | Stop reason: HOSPADM

## 2024-07-12 RX ORDER — SODIUM CHLORIDE 0.9 % (FLUSH) 0.9 %
3 SYRINGE (ML) INJECTION EVERY 12 HOURS SCHEDULED
Status: DISCONTINUED | OUTPATIENT
Start: 2024-07-12 | End: 2024-07-12 | Stop reason: HOSPADM

## 2024-07-12 RX ORDER — SODIUM CHLORIDE, SODIUM LACTATE, POTASSIUM CHLORIDE, CALCIUM CHLORIDE 600; 310; 30; 20 MG/100ML; MG/100ML; MG/100ML; MG/100ML
150 INJECTION, SOLUTION INTRAVENOUS CONTINUOUS
Status: DISCONTINUED | OUTPATIENT
Start: 2024-07-12 | End: 2024-07-12

## 2024-07-12 RX ORDER — DEXAMETHASONE SODIUM PHOSPHATE 4 MG/ML
INJECTION, SOLUTION INTRA-ARTICULAR; INTRALESIONAL; INTRAMUSCULAR; INTRAVENOUS; SOFT TISSUE AS NEEDED
Status: DISCONTINUED | OUTPATIENT
Start: 2024-07-12 | End: 2024-07-12 | Stop reason: SURG

## 2024-07-12 RX ORDER — ROCURONIUM BROMIDE 10 MG/ML
INJECTION, SOLUTION INTRAVENOUS AS NEEDED
Status: DISCONTINUED | OUTPATIENT
Start: 2024-07-12 | End: 2024-07-12 | Stop reason: SURG

## 2024-07-12 RX ORDER — ESMOLOL HYDROCHLORIDE 10 MG/ML
INJECTION INTRAVENOUS AS NEEDED
Status: DISCONTINUED | OUTPATIENT
Start: 2024-07-12 | End: 2024-07-12 | Stop reason: SURG

## 2024-07-12 RX ADMIN — DEXAMETHASONE SODIUM PHOSPHATE 4 MG: 10 INJECTION, SOLUTION INTRAMUSCULAR; INTRAVENOUS at 07:37

## 2024-07-12 RX ADMIN — SODIUM CHLORIDE 2000 MG: 900 INJECTION INTRAVENOUS at 07:36

## 2024-07-12 RX ADMIN — ESMOLOL HYDROCHLORIDE 20 MG: 10 INJECTION, SOLUTION INTRAVENOUS at 07:49

## 2024-07-12 RX ADMIN — LOSARTAN POTASSIUM 25 MG: 25 TABLET, FILM COATED ORAL at 13:54

## 2024-07-12 RX ADMIN — FENTANYL CITRATE 50 MCG: 50 INJECTION, SOLUTION INTRAMUSCULAR; INTRAVENOUS at 11:11

## 2024-07-12 RX ADMIN — THIAMINE HYDROCHLORIDE 100 MG: 100 INJECTION, SOLUTION INTRAMUSCULAR; INTRAVENOUS at 15:46

## 2024-07-12 RX ADMIN — 0.12% CHLORHEXIDINE GLUCONATE 15 ML: 1.2 RINSE ORAL at 06:45

## 2024-07-12 RX ADMIN — ONDANSETRON 4 MG: 2 INJECTION INTRAMUSCULAR; INTRAVENOUS at 10:27

## 2024-07-12 RX ADMIN — ACETAMINOPHEN 1000 MG: 500 TABLET ORAL at 06:48

## 2024-07-12 RX ADMIN — HYDROMORPHONE HYDROCHLORIDE 2 MG: 2 TABLET ORAL at 14:38

## 2024-07-12 RX ADMIN — FENTANYL CITRATE 100 MCG: 50 INJECTION, SOLUTION INTRAMUSCULAR; INTRAVENOUS at 08:43

## 2024-07-12 RX ADMIN — SUGAMMADEX 300 MG: 100 INJECTION, SOLUTION INTRAVENOUS at 10:27

## 2024-07-12 RX ADMIN — BUPIVACAINE HYDROCHLORIDE 60 ML: 2.5 INJECTION, SOLUTION EPIDURAL; INFILTRATION; INTRACAUDAL; PERINEURAL at 07:37

## 2024-07-12 RX ADMIN — SODIUM CHLORIDE, POTASSIUM CHLORIDE, SODIUM LACTATE AND CALCIUM CHLORIDE 150 ML/HR: 600; 310; 30; 20 INJECTION, SOLUTION INTRAVENOUS at 13:00

## 2024-07-12 RX ADMIN — TRANEXAMIC ACID 1000 MG: 10 INJECTION, SOLUTION INTRAVENOUS at 18:01

## 2024-07-12 RX ADMIN — Medication 5 MG: at 11:37

## 2024-07-12 RX ADMIN — DROPERIDOL 0.62 MG: 2.5 INJECTION, SOLUTION INTRAMUSCULAR; INTRAVENOUS at 11:10

## 2024-07-12 RX ADMIN — LABETALOL HYDROCHLORIDE 5 MG: 5 INJECTION, SOLUTION INTRAVENOUS at 08:14

## 2024-07-12 RX ADMIN — GABAPENTIN 600 MG: 300 CAPSULE ORAL at 06:48

## 2024-07-12 RX ADMIN — PROPOFOL 25 MCG/KG/MIN: 10 INJECTION, EMULSION INTRAVENOUS at 07:45

## 2024-07-12 RX ADMIN — ACETAMINOPHEN 1000 MG: 500 TABLET ORAL at 22:05

## 2024-07-12 RX ADMIN — HYDROMORPHONE HYDROCHLORIDE 1 MG: 1 INJECTION, SOLUTION INTRAMUSCULAR; INTRAVENOUS; SUBCUTANEOUS at 18:29

## 2024-07-12 RX ADMIN — HYDROMORPHONE HYDROCHLORIDE 1 MG: 1 INJECTION, SOLUTION INTRAMUSCULAR; INTRAVENOUS; SUBCUTANEOUS at 22:06

## 2024-07-12 RX ADMIN — SCOPOLAMINE 1 PATCH: 1.5 PATCH, EXTENDED RELEASE TRANSDERMAL at 06:48

## 2024-07-12 RX ADMIN — ROCURONIUM BROMIDE 10 MG: 10 INJECTION INTRAVENOUS at 08:25

## 2024-07-12 RX ADMIN — OXYCODONE HYDROCHLORIDE 5 MG: 5 TABLET ORAL at 11:28

## 2024-07-12 RX ADMIN — SIMETHICONE 80 MG: 80 TABLET, CHEWABLE ORAL at 13:00

## 2024-07-12 RX ADMIN — PROPOFOL 300 MG: 10 INJECTION, EMULSION INTRAVENOUS at 07:31

## 2024-07-12 RX ADMIN — LIDOCAINE HYDROCHLORIDE 50 MG: 10 INJECTION, SOLUTION EPIDURAL; INFILTRATION; INTRACAUDAL; PERINEURAL at 07:31

## 2024-07-12 RX ADMIN — GABAPENTIN 100 MG: 100 CAPSULE ORAL at 15:46

## 2024-07-12 RX ADMIN — ROCURONIUM BROMIDE 100 MG: 10 INJECTION INTRAVENOUS at 07:31

## 2024-07-12 RX ADMIN — SODIUM CHLORIDE 2000 MG: 900 INJECTION INTRAVENOUS at 15:45

## 2024-07-12 RX ADMIN — DEXAMETHASONE SODIUM PHOSPHATE 8 MG: 4 INJECTION INTRA-ARTICULAR; INTRALESIONAL; INTRAMUSCULAR; INTRAVENOUS; SOFT TISSUE at 07:54

## 2024-07-12 RX ADMIN — SODIUM CHLORIDE, POTASSIUM CHLORIDE, SODIUM LACTATE AND CALCIUM CHLORIDE 1000 ML: 600; 310; 30; 20 INJECTION, SOLUTION INTRAVENOUS at 06:35

## 2024-07-12 RX ADMIN — GABAPENTIN 100 MG: 100 CAPSULE ORAL at 22:06

## 2024-07-12 RX ADMIN — PANTOPRAZOLE SODIUM 40 MG: 40 INJECTION, POWDER, FOR SOLUTION INTRAVENOUS at 06:48

## 2024-07-12 RX ADMIN — LABETALOL HYDROCHLORIDE 5 MG: 5 INJECTION, SOLUTION INTRAVENOUS at 11:37

## 2024-07-12 RX ADMIN — CETIRIZINE HYDROCHLORIDE 10 MG: 10 TABLET, FILM COATED ORAL at 13:54

## 2024-07-12 RX ADMIN — SODIUM CHLORIDE, POTASSIUM CHLORIDE, SODIUM LACTATE AND CALCIUM CHLORIDE 150 ML/HR: 600; 310; 30; 20 INJECTION, SOLUTION INTRAVENOUS at 07:20

## 2024-07-12 RX ADMIN — SODIUM CHLORIDE 1000 ML: 9 INJECTION, SOLUTION INTRAVENOUS at 17:07

## 2024-07-12 RX ADMIN — 0.12% CHLORHEXIDINE GLUCONATE 15 ML: 1.2 RINSE ORAL at 06:48

## 2024-07-12 RX ADMIN — LABETALOL HYDROCHLORIDE 5 MG: 5 INJECTION, SOLUTION INTRAVENOUS at 08:36

## 2024-07-12 RX ADMIN — ONDANSETRON 4 MG: 4 TABLET, ORALLY DISINTEGRATING ORAL at 14:38

## 2024-07-12 RX ADMIN — ACETAMINOPHEN 1000 MG: 500 TABLET ORAL at 13:54

## 2024-07-12 RX ADMIN — ROCURONIUM BROMIDE 10 MG: 10 INJECTION INTRAVENOUS at 08:55

## 2024-07-12 RX ADMIN — SODIUM CHLORIDE, POTASSIUM CHLORIDE, SODIUM LACTATE AND CALCIUM CHLORIDE: 600; 310; 30; 20 INJECTION, SOLUTION INTRAVENOUS at 08:20

## 2024-07-12 NOTE — ANESTHESIA PROCEDURE NOTES
"Peripheral Block      Patient reassessed immediately prior to procedure    Patient location during procedure: OR  Reason for block: at surgeon's request and post-op pain management  Performed by  Anesthesiologist: Matt Thomas Jr., MD  Preanesthetic Checklist  Completed: patient identified, IV checked, site marked, risks and benefits discussed, surgical consent, monitors and equipment checked, pre-op evaluation and timeout performed  Prep:  Pt Position: supine  Sterile barriers:cap, gloves, mask and washed/disinfected hands  Prep: ChloraPrep  Patient monitoring: blood pressure monitoring, continuous pulse oximetry and EKG  Procedure    Sedation: yes  Performed under: general  Guidance:ultrasound guided  Images:still images obtained, printed/placed on chart    Laterality:Bilateral  Block Type:TAP  Injection Technique:single-shot  Needle Type:short-bevel and echogenic  Needle Gauge:20 G  Resistance on Injection: none    Medications Used: bupivacaine PF (MARCAINE) 0.25 % injection - Injection   60 mL - 7/12/2024 7:37:00 AM  dexamethasone sodium phosphate injection - Injection   4 mg - 7/12/2024 7:37:00 AM      Medications  Comment:Block Injection:  LA dose divided between Right and Left block        Post Assessment  Injection Assessment: negative aspiration for heme, incremental injection and no paresthesia on injection  Patient Tolerance:comfortable throughout block  Complications:no  Additional Notes    Subcostal TAPs    A high-frequency linear transducer, with sterile cover, was placed sub-xiphoid to identify [place], right and left Rectus Abdominus Muscles (TIGIST). The transducer was moved either right or left subcostally to identify the TIGIST and the Transverse Abdominus Muscle (SAMUELS). The insertion site was prepped in sterile fashion and then localized with 2-5 ml of 1% Lidocaine. Using ultrasound-guidance, a 20-gauge B-Jurado 4\" Ultraplex 360 non-stimulating echogenic needle was advanced in plane, from medial " to lateral, until the tip of the needle was in the fascial plane between the TIGIST and SAMUELS. 1-3ml of preservative free normal saline was used to hydro-dissect the fascial planes. After the fascial plane was verified, the local anesthetic (LA) was injected. The procedure was repeated on the opposite side for bilateral coverage. Aspiration every 5 ml to prevent intravascular injection. Injection was completed with negative aspiration of blood and negative intravascular injection. Injection pressures were normal with minimal resistance. The subcostal approach to the TAP nerve block ideally anesthetizes the intercostal nerves T6-T9.    Performed by: Ever Acuna CRNA

## 2024-07-12 NOTE — ANESTHESIA POSTPROCEDURE EVALUATION
Patient: Annie Kent    Procedure Summary       Date: 07/12/24 Room / Location:  [facility]  [facility]    Anesthesia Start: 0726 Anesthesia Stop: 1044    Procedures:       OLIVER-EN-Y GASTRIC BYPASS LAPAROSCOPIC WITH DAVINCI ROBOT (Abdomen)      LAPAROSCOPIC HIATAL HERNIA REPAIR WITH DAVINCI ROBOT (Abdomen)      ESOPHAGOGASTRODUODENOSCOPY (Esophagus) Diagnosis:       Partial gastric outlet obstruction      S/P laparoscopic sleeve gastrectomy      Hiatal hernia with gastroesophageal reflux      (Partial gastric outlet obstruction [K31.1])      (S/P laparoscopic sleeve gastrectomy [Z98.84])      (Hiatal hernia with gastroesophageal reflux [K44.9, K21.9])    Surgeons: Rhett Arce MD Provider: Matt Thomas Jr., MD    Anesthesia Type: general with block ASA Status: 3            Anesthesia Type: general with block    Vitals  Vitals Value Taken Time   /92 07/12/24 1043   Temp 97 °F (36.1 °C) 07/12/24 1043   Pulse 77 07/12/24 1044   Resp 14 07/12/24 1043   SpO2 97 % 07/12/24 1044   Vitals shown include unfiled device data.        Post Anesthesia Care and Evaluation    Patient location during evaluation: PACU  Patient participation: complete - patient participated  Level of consciousness: awake  Pain score: 0  Pain management: adequate    Airway patency: patent  Anesthetic complications: No anesthetic complications  PONV Status: none  Cardiovascular status: acceptable and stable  Respiratory status: nasal cannula, unassisted, acceptable and spontaneous ventilation  Hydration status: acceptable

## 2024-07-12 NOTE — INTERVAL H&P NOTE
H&P updated. The patient was examined and the following changes are noted:  Hb A1C normal, MRSA screen negative, no prealbumin performed.

## 2024-07-12 NOTE — NURSING NOTE
Patient arrived on floor at approximately 1200. Very lethargic and sleeping between care. Complaining of pain rating 9/10 and feeling fullness in her abdomen. Pain medicine administered. Patient Stated she wanted to use the restroom. When sitting on BSC patient began complaining of ringing in her ears and dizziness. Advised to sit on BSC until she felt better. Patient then stood up to go back to bed and then became weak and dizzy, grasping onto staff. Patient returned to bed safely and BP showed 98/52 with HR in 120's. CATINA drain continues to put 70ml output/hr approx. Bariatric PA Kelin Spangler paged for change in condition. TXA infused, bolus given, H&H checked, and lovenox held per orders. Patient states she is beginning to feel better. VSS as of now.

## 2024-07-12 NOTE — OP NOTE
Preoperative Diagnosis:              Hiatal hernia, chronic nausea, GE reflux, partial gastric outlet obstruction status post laparoscopic sleeve gastrectomy March 2021 (Dr. Bird, [facility])                                         Postoperative Diagnosis:                                Same     Procedure:                                                   Laparoscopic robotic assisted antecolic revision previous sleeve gastrectomy to antecolic Justino-en-Y gastric bypass                                        Laparoscopic robotic assisted hiatal hernia repair  (not paraesophageal) with falciform ligament reinforcement                                                                       EGD     Surgeon:                                                       GALLO Arce MD     Assistant: Jamel Jones PA-C, Soraida Rosales PA-C  was responsible for performing the following activities: Retraction, Irrigation, Suturing, Closing, Placing Dressing, and Held/Positioned Camera and their skilled assistance was necessary for the success of this case.     Anesthesia:                                                   GETA     EBL:                                                              Minimal     Fluids:                                                           Crystalloid     Specimens:                                                    None     Drains:                                                          #10 CATINA     Counts:                                                          Correct     Complications:                                               None     Indications:    This is a 28-year-old female status post laparoscopic sleeve gastrectomy in March 2021 by Dr. Bird at [facility].  Since surgery she has developed chronic reflux, nausea and vomiting and workup is revealed a small hiatal hernia and partial gastric outlet obstruction.  Please see my office notes.  Risks, benefits, and alternative therapies  were discussed and she wishes to proceed with laparoscopic robotic assisted revision of her sleeve gastrectomy to a Justino-en-Y gastric bypass, hiatal hernia repair, and EGD.     Operative technique:      The patient was brought to the operating room, and placed supine upon the operating room table.  SCD hose were placed, she underwent uneventful general endotracheal anesthesia per the anesthesiology staff, the anesthesiology staff performed a TAP block, she received IV Ancef and subcutaneous Lovenox, a Singletary catheter was placed, and her abdomen was prepped and draped with ChloraPrep in a sterile fashion, an Ioban was used as well.     The peritoneal cavity was entered roughly at the level of the umbilicus in approximately the left midclavicular line using a 5 mm trocar utilizing an Optiview technique and the abdomen was insufflated to pressure of 15 mmHg with CO2 gas.  Exploratory laparoscopy revealed no evidence of injury from the entrance technique, fairly extensive omental adhesions in the pelvis and lower abdomen normal-appearing liver, previous cholecystectomy, retention of the antrum and approximately half of the body of the stomach.    Remaining trocars were placed under direct visualization throughout the case.  After infiltration of the peritoneum under direct visualization a 12 mm robotic trocar was placed in the right lateral abdomen, 8 mm robotic trocars to the right of the umbilicus, left upper and lower lateral abdomen, and the entrance trocar was changed out to an 8 mm robotic trocar.       Through a stab incision in the epigastrium a Joey retractor was used to elevate the left lobe of the liver where some adhesions to the undersurface in the area of the hiatus noted.    The omental adhesions in the lower abdomen were lysed with the Enseal device.  No abdominal wall hernias appreciated.    The omentum was elevated and identified the ligament of Treitz and it was run distally for approximately 100  cm (future BP limb) and marked on the antimesenteric border with 2 different colored sutures for orientation (2-0 silk and 2-0 Vicryl plus) and then run distally another 125 cm (future Justino limb) and marked in a similar fashion.     The Xi robot was docked and I scrubbed out and went to the robotic console.  The greater omentum was divided with the vessel sealing device up to the transverse colon in the area of the intended antecolic gastric bypass.  The falciform ligament was amputated at its base and mobilized up to the level of the liver.  Adhesions to the undersurface of the left lobe of the liver were lysed exposing the hiatus.  The hiatal hernia was not readily apparent from the anterior view.  Several photos obtained throughout the procedure.  Omental adhesions to the lateral sleeve were divided exposing the left tenzin where the lateral superior sleeve staple line could be seen herniating into the mediastinum.  The hernia sac was incised along the base of the left tenzin and this was extended up and across the phrenoesophageal membrane.  The pars flaccida was divided above and below a replaced hepatic vessel which was preserved.  The hernia sac was incised along the base of the right tenzin and this was extended up and across the phrenoesophageal membrane.  The hernia sac and its contents were dissected out of the mediastinum and reduced to below the level of the crura.  There was not a paraesophageal component.  A latex free drain was used temporarily for esophageal retraction.  The anterior and posterior vagus nerves were not encountered.  The crura were dissected to their meeting point inferiorly.  Approximately 3 cm of intra-abdominal esophagus obtained.  The crura was closed posteriorly using a running nonabsorbable 2-0 V-Loc suture with good result.     The previously mobilized falciform ligament was passed behind the esophagus in front of the crural repair and sutured as a sling over the angle of Hiss using  a running nonabsorbable 2-0 V-Loc suture continuing the suture anteriorly along the GE junction and then suturing medially to the falciform ligament along the lesser curvature.     The lesser sac was entered creating a window below the takeoff of the left gastric artery.  The lesser omentum in this area approximately 5 cm below the GE junction was divided with a 60 mm robotic stapler using a white load.  The stomach was transected at this level horizontally using a 60 mm green load creating the gastric pouch.    The ligament of Treitz again identified and the small bowel run in appropriate orientation bringing the first set of markings.    It reached up nicely to the gastric pouch without tension.  A linear gastrojejunostomy was then carried out as follows: The anesthesiology staff passed a 36 [place] blunt-tipped bougie dilator which was manipulated to the distal pouch.  A gastrotomy was made medially and posteriorly over the bougie dilator and a corresponding enterotomy made on the omega loop antimesenteric border.  The gastrojejunostomy was created using a 60 mm blue load going in about 35 mm.  The intervening enterotomy was closed over the bougie dilator in 2 layers using running absorbable 3-0 Stratafix sutures.  The end of the staple line was imbricated with a figure-of-eight seromuscular suture using remaining suture.  Upon completion the anastomosis rested nicely without tension or torsion.    A window was created a couple centimeters distal to the anastomosis on the future BP limb and this was divided with a 60 mm white load.  The underlying mesentery was taken down for short distance to relieve any tension on the future jejunojejunostomy.  The Justino limb was run distally to the next marking (roughly 125 cm as above) and a linear jejunojejunostomy was carried out as follows:    An enterotomy was made on the antimesenteric border of each limb and a common lumen created with a 60 mm white load.  The intervening  enterotomy was closed in a single seromuscular layer using running 3-0 absorbable Stratafix suture.      The potential mesenteric defects at the jejunojejunostomy and at Lou's space were individually closed using running 2-0 nonabsorbable V-Loc sutures.  In addition to hopefully prevent kinking at the jejunojejunostomy the Justino limb just proximal to the jejunojejunostomy anastomosis was tacked to the retroperitoneum using a running absorbable 3-0 Stratafix suture.      The Justino limb several centimeters distal to the gastrojejunostomy was occluded with a noncrushing bowel clamp and the gastrojejunostomy was submerged under saline.  I performed upper endoscopy.  The endoscope advanced through the esophagus into the gastric pouch,  gastrojejunostomy widely patent.  No ischemia of the anastomosis, no bleeding at the anastomosis, no air bubbles or leak seen.  Z-line roughly 39 cm.  Nice distention of the Justino limb.  The endoscope was withdrawn.  Endoscopic photodocumentation obtained of the GE junction and Justino limb.     I scrubbed back in and the robot was undocked.  Irrigation fluid was suctioned free.    Under direct visualization a 5 mm trocar was placed high in the right upper quadrant through which to withdraw the CATINA drain.  A #10 CATINA drain was placed under the left lobe of the liver and up over the spleen and brought out through the RUQ 5 mm trocar site incision and anchored to the skin with a 2-0 nylon suture.   The Joey retractor was removed.  Fascia at the 12 mm trocar site incision was closed with a horizontal mattress 0 Vicryl suture placed with a suture passer under direct visualization and tying the knot extracorporeally.  Remaining trochars were removed under direct visualization, no bleeding noted from their sites.     Subcutaneous tissue in the 12 mm trocar site incision was closed with an interrupted 2-0 Vicryl plus suture and skin in each incision was closed using 3-0 Monocryl plus in an  interrupted subcuticular stitch followed by skin glue.  A dry sterile dressing was placed around the CATINA site.  The Singletary catheter was removed.     The patient tolerated the procedure well without complication, was taken to the recovery room in stable condition.

## 2024-07-12 NOTE — ANESTHESIA PREPROCEDURE EVALUATION
Anesthesia Evaluation     Patient summary reviewed and Nursing notes reviewed   history of anesthetic complications:  PONV  NPO Solid Status: > 8 hours  NPO Liquid Status: > 2 hours           Airway   Mallampati: I  TM distance: >3 FB  Neck ROM: full  No difficulty expected  Dental - normal exam     Pulmonary    (-) not a smoker  Cardiovascular     (+) hypertension      Neuro/Psych  GI/Hepatic/Renal/Endo    (+) obesity, hiatal hernia, GERD  (-) liver disease, no renal disease, diabetes, no thyroid disorder    ROS Comment: H/o gastric sleeve    Musculoskeletal     Abdominal    Substance History      OB/GYN          Other   arthritis,                       Anesthesia Plan    ASA 3     general with block     intravenous induction     Anesthetic plan, risks, benefits, and alternatives have been provided, discussed and informed consent has been obtained with: patient.    Use of blood products discussed with  Consented to blood products.    Plan discussed with CRNA.        CODE STATUS:

## 2024-07-12 NOTE — CASE MANAGEMENT/SOCIAL WORK
Discharge Planning Assessment  [facility]     Patient Name: Annie Kent  MRN: [id number]  Today's Date: 7/12/2024    Admit Date: 7/12/2024    Plan: home   Discharge Needs Assessment       Row Name 07/12/24 1215       Living Environment    People in Home spouse    Current Living Arrangements home    Primary Care Provided by self       Transition Planning    Patient/Family Anticipates Transition to home with family       Discharge Needs Assessment    Readmission Within the Last 30 Days no previous admission in last 30 days    Equipment Currently Used at Home none    Concerns to be Addressed basic needs;discharge planning                   Discharge Plan       Row Name 07/12/24 1216       Plan    Plan home    Patient/Family in Agreement with Plan yes    Plan Comments I spoke with this patient's  to complete the IDP. They live in [facility]. She is independent with activities of daily living and mobility. She anticipates returning home after this hospitalization, and the  can transport. Case management will follow.    Final Discharge Disposition Code 01 - home or self-care                  Continued Care and Services - Admitted Since 7/12/2024    No active coordination exists for this encounter.       Expected Discharge Date and Time       Expected Discharge Date Expected Discharge Time    Jul 14, 2024            Demographic Summary       Row Name 07/12/24 1215       General Information    General Information Comments I confirmed that Flory Suh is Ms Kent's PCP and she has Chrissie THURMAN and [facility]                   Functional Status       Row Name 07/12/24 1215       Functional Status, IADL    Medications independent    Meal Preparation independent    Housekeeping independent    Laundry independent    Shopping independent                   Psychosocial    No documentation.                  Abuse/Neglect    No documentation.                  Legal    No documentation.                   Substance Abuse    No documentation.                  Patient Forms    No documentation.                     Marguerite Rosas RN

## 2024-07-12 NOTE — BRIEF OP NOTE
GASTRIC BYPASS LAPAROSCOPIC WITH DAVINCI ROBOT, LAPAROSCOPIC HIATAL HERNIA REPAIR WITH DAVINCI ROBOT  Progress Note    Annie ASHWINI Donte  7/12/2024    Pre-op Diagnosis:   Partial gastric outlet obstruction [K31.1]  S/P laparoscopic sleeve gastrectomy [Z98.84]  Hiatal hernia with gastroesophageal reflux [K44.9, K21.9]       Post-Op Diagnosis Codes:     * Partial gastric outlet obstruction [K31.1]     * S/P laparoscopic sleeve gastrectomy [Z98.84]     * Hiatal hernia with gastroesophageal reflux [K44.9, K21.9]    Procedure/CPT® Codes:        Procedure(s):  OLIVER-EN-Y GASTRIC BYPASS LAPAROSCOPIC WITH DAVINCI ROBOT  LAPAROSCOPIC HIATAL HERNIA REPAIR WITH DAVINCI ROBOT WITH FALCIFORM LIGAMENT REINFORCEMENT  ESOPHAGOGASTRODUODENOSCOPY              Surgeon(s):  Rhett Arce MD Weiss, George Derek, MD    Anesthesia: General with Block    Staff:   Circulator: Dianna Lock RN  Scrub Person: Bailey Beard  Nursing Assistant: Izabella Mccoy CNA        Estimated Blood Loss: minimal    Urine Voided: * No values recorded between 7/12/2024 12:00 AM and 7/12/2024  7:23 AM *    Specimens:                None          Drains: * No LDAs found *    Findings:         Complications: None    Assistant: Jamel Jones PA-C, Soraida Rosales PA-C  was responsible for performing the following activities: Retraction, Irrigation, Suturing, Closing, Placing Dressing, and Held/Positioned Camera and their skilled assistance was necessary for the success of this case.    Rhett Arce MD     Date: 7/12/2024  Time: 07:23 EDT

## 2024-07-12 NOTE — ANESTHESIA PROCEDURE NOTES
Airway  Urgency: elective    Date/Time: 7/12/2024 7:33 AM  Airway not difficult    General Information and Staff    Patient location during procedure: OR  CRNA/CAA: Ever Acuna CRNA    Indications and Patient Condition  Indications for airway management: airway protection    Preoxygenated: yes  MILS not maintained throughout  Mask difficulty assessment: 2 - vent by mask + OA or adjuvant +/- NMBA    Final Airway Details  Final airway type: endotracheal airway      Successful airway: ETT  Cuffed: yes   Successful intubation technique: direct laryngoscopy  Facilitating devices/methods: intubating stylet  Endotracheal tube insertion site: oral  Blade: Mendez  Blade size: 2  ETT size (mm): 7.0  Cormack-Lehane Classification: grade I - full view of glottis  Placement verified by: chest auscultation and capnometry   Cuff volume (mL): 6  Measured from: lips  ETT/EBT  to lips (cm): 21  Number of attempts at approach: 1  Assessment: lips, teeth, and gum same as pre-op and atraumatic intubation    Additional Comments  Negative epigastric sounds, Breath sound equal bilaterally with symmetric chest rise and fall

## 2024-07-13 ENCOUNTER — APPOINTMENT (OUTPATIENT)
Dept: GENERAL RADIOLOGY | Facility: HOSPITAL | Age: 29
End: 2024-07-13
Payer: COMMERCIAL

## 2024-07-13 LAB
ALBUMIN SERPL-MCNC: 3.7 G/DL (ref 3.5–5.2)
ALBUMIN/GLOB SERPL: 1.9 G/DL
ALP SERPL-CCNC: 50 U/L (ref 39–117)
ALT SERPL W P-5'-P-CCNC: 47 U/L (ref 1–33)
ANION GAP SERPL CALCULATED.3IONS-SCNC: 9 MMOL/L (ref 5–15)
AST SERPL-CCNC: 43 U/L (ref 1–32)
BASOPHILS # BLD AUTO: 0.05 10*3/MM3 (ref 0–0.2)
BASOPHILS NFR BLD AUTO: 0.2 % (ref 0–1.5)
BILIRUB SERPL-MCNC: 0.4 MG/DL (ref 0–1.2)
BUN SERPL-MCNC: 6 MG/DL (ref 6–20)
BUN/CREAT SERPL: 10 (ref 7–25)
CALCIUM SPEC-SCNC: 8.1 MG/DL (ref 8.6–10.5)
CHLORIDE SERPL-SCNC: 104 MMOL/L (ref 98–107)
CO2 SERPL-SCNC: 25 MMOL/L (ref 22–29)
CREAT SERPL-MCNC: 0.6 MG/DL (ref 0.57–1)
DEPRECATED RDW RBC AUTO: 39.6 FL (ref 37–54)
EGFRCR SERPLBLD CKD-EPI 2021: 124 ML/MIN/1.73
EOSINOPHIL # BLD AUTO: 0.01 10*3/MM3 (ref 0–0.4)
EOSINOPHIL NFR BLD AUTO: 0 % (ref 0.3–6.2)
ERYTHROCYTE [DISTWIDTH] IN BLOOD BY AUTOMATED COUNT: 13 % (ref 12.3–15.4)
GLOBULIN UR ELPH-MCNC: 2 GM/DL
GLUCOSE SERPL-MCNC: 113 MG/DL (ref 65–99)
HCT VFR BLD AUTO: 26.3 % (ref 34–46.6)
HGB BLD-MCNC: 8.1 G/DL (ref 12–15.9)
HGB BLD-MCNC: 8.7 G/DL (ref 12–15.9)
IMM GRANULOCYTES # BLD AUTO: 0.14 10*3/MM3 (ref 0–0.05)
IMM GRANULOCYTES NFR BLD AUTO: 0.6 % (ref 0–0.5)
IRON 24H UR-MRATE: 35 MCG/DL (ref 37–145)
LYMPHOCYTES # BLD AUTO: 1.57 10*3/MM3 (ref 0.7–3.1)
LYMPHOCYTES NFR BLD AUTO: 6.7 % (ref 19.6–45.3)
MCH RBC QN AUTO: 28.2 PG (ref 26.6–33)
MCHC RBC AUTO-ENTMCNC: 33.1 G/DL (ref 31.5–35.7)
MCV RBC AUTO: 85.4 FL (ref 79–97)
MONOCYTES # BLD AUTO: 1.78 10*3/MM3 (ref 0.1–0.9)
MONOCYTES NFR BLD AUTO: 7.6 % (ref 5–12)
NEUTROPHILS NFR BLD AUTO: 19.85 10*3/MM3 (ref 1.7–7)
NEUTROPHILS NFR BLD AUTO: 84.9 % (ref 42.7–76)
NRBC BLD AUTO-RTO: 0 /100 WBC (ref 0–0.2)
PLATELET # BLD AUTO: 363 10*3/MM3 (ref 140–450)
PMV BLD AUTO: 10.4 FL (ref 6–12)
POTASSIUM SERPL-SCNC: 4.4 MMOL/L (ref 3.5–5.2)
PREALB SERPL-MCNC: 16.5 MG/DL (ref 20–40)
PROT SERPL-MCNC: 5.7 G/DL (ref 6–8.5)
RBC # BLD AUTO: 3.08 10*6/MM3 (ref 3.77–5.28)
SODIUM SERPL-SCNC: 138 MMOL/L (ref 136–145)
WBC NRBC COR # BLD AUTO: 23.4 10*3/MM3 (ref 3.4–10.8)

## 2024-07-13 PROCEDURE — 63710000001 PROCHLORPERAZINE MALEATE PER 10 MG: Performed by: SURGERY

## 2024-07-13 PROCEDURE — 25010000002 CEFAZOLIN PER 500 MG: Performed by: SURGERY

## 2024-07-13 PROCEDURE — 25010000002 POTASSIUM CHLORIDE PER 2 MEQ: Performed by: SURGERY

## 2024-07-13 PROCEDURE — 80053 COMPREHEN METABOLIC PANEL: CPT | Performed by: SURGERY

## 2024-07-13 PROCEDURE — 74240 X-RAY XM UPR GI TRC 1CNTRST: CPT

## 2024-07-13 PROCEDURE — 25010000002 THIAMINE PER 100 MG: Performed by: SURGERY

## 2024-07-13 PROCEDURE — 84134 ASSAY OF PREALBUMIN: CPT | Performed by: SURGERY

## 2024-07-13 PROCEDURE — 99024 POSTOP FOLLOW-UP VISIT: CPT | Performed by: SURGERY

## 2024-07-13 PROCEDURE — 0 DIATRIZOATE MEGLUMINE & SODIUM PER 1 ML: Performed by: SURGERY

## 2024-07-13 PROCEDURE — 85025 COMPLETE CBC W/AUTO DIFF WBC: CPT | Performed by: SURGERY

## 2024-07-13 PROCEDURE — 25010000002 MORPHINE PER 10 MG: Performed by: SURGERY

## 2024-07-13 PROCEDURE — 25010000002 ONDANSETRON PER 1 MG: Performed by: SURGERY

## 2024-07-13 PROCEDURE — 25010000002 CYANOCOBALAMIN PER 1000 MCG: Performed by: SURGERY

## 2024-07-13 PROCEDURE — 25010000002 HYDROMORPHONE 1 MG/ML SOLUTION: Performed by: SURGERY

## 2024-07-13 PROCEDURE — 25010000002 NA FERRIC GLUC CPLX PER 12.5 MG: Performed by: SURGERY

## 2024-07-13 PROCEDURE — 25810000003 LACTATED RINGERS PER 1000 ML: Performed by: SURGERY

## 2024-07-13 PROCEDURE — 85018 HEMOGLOBIN: CPT | Performed by: SURGERY

## 2024-07-13 PROCEDURE — 83540 ASSAY OF IRON: CPT | Performed by: SURGERY

## 2024-07-13 PROCEDURE — 63710000001 ONDANSETRON ODT 4 MG TABLET DISPERSIBLE: Performed by: SURGERY

## 2024-07-13 PROCEDURE — 25810000003 SODIUM CHLORIDE 0.9 % SOLUTION 1,000 ML FLEX CONT: Performed by: SURGERY

## 2024-07-13 RX ORDER — DOCUSATE SODIUM 100 MG/1
100 CAPSULE, LIQUID FILLED ORAL 2 TIMES DAILY
Status: DISCONTINUED | OUTPATIENT
Start: 2024-07-13 | End: 2024-07-14 | Stop reason: HOSPADM

## 2024-07-13 RX ORDER — CYCLOBENZAPRINE HCL 10 MG
10 TABLET ORAL EVERY 8 HOURS SCHEDULED
Status: DISCONTINUED | OUTPATIENT
Start: 2024-07-13 | End: 2024-07-14 | Stop reason: HOSPADM

## 2024-07-13 RX ADMIN — SODIUM CHLORIDE 2000 MG: 900 INJECTION INTRAVENOUS at 01:16

## 2024-07-13 RX ADMIN — ACETAMINOPHEN 1000 MG: 500 TABLET ORAL at 05:51

## 2024-07-13 RX ADMIN — DOCUSATE SODIUM 100 MG: 100 CAPSULE, LIQUID FILLED ORAL at 20:24

## 2024-07-13 RX ADMIN — GABAPENTIN 100 MG: 100 CAPSULE ORAL at 16:26

## 2024-07-13 RX ADMIN — OXYCODONE HYDROCHLORIDE 5 MG: 5 TABLET ORAL at 16:26

## 2024-07-13 RX ADMIN — POTASSIUM CHLORIDE AND SODIUM CHLORIDE 125 ML/HR: 450; 150 INJECTION, SOLUTION INTRAVENOUS at 08:53

## 2024-07-13 RX ADMIN — CYCLOBENZAPRINE 10 MG: 10 TABLET, FILM COATED ORAL at 21:28

## 2024-07-13 RX ADMIN — OXYCODONE HYDROCHLORIDE 5 MG: 5 TABLET ORAL at 08:26

## 2024-07-13 RX ADMIN — GABAPENTIN 100 MG: 100 CAPSULE ORAL at 20:24

## 2024-07-13 RX ADMIN — MORPHINE SULFATE 4 MG: 4 INJECTION, SOLUTION INTRAMUSCULAR; INTRAVENOUS at 20:24

## 2024-07-13 RX ADMIN — SIMETHICONE 80 MG: 80 TABLET, CHEWABLE ORAL at 11:47

## 2024-07-13 RX ADMIN — SODIUM CHLORIDE, POTASSIUM CHLORIDE, SODIUM LACTATE AND CALCIUM CHLORIDE 150 ML/HR: 600; 310; 30; 20 INJECTION, SOLUTION INTRAVENOUS at 02:24

## 2024-07-13 RX ADMIN — ONDANSETRON 4 MG: 2 INJECTION INTRAMUSCULAR; INTRAVENOUS at 03:34

## 2024-07-13 RX ADMIN — CYCLOBENZAPRINE 10 MG: 10 TABLET, FILM COATED ORAL at 14:07

## 2024-07-13 RX ADMIN — ACETAMINOPHEN 1000 MG: 500 TABLET ORAL at 21:28

## 2024-07-13 RX ADMIN — SODIUM CHLORIDE 125 MG: 9 INJECTION, SOLUTION INTRAVENOUS at 16:28

## 2024-07-13 RX ADMIN — LOSARTAN POTASSIUM 25 MG: 25 TABLET, FILM COATED ORAL at 08:26

## 2024-07-13 RX ADMIN — HYDROMORPHONE HYDROCHLORIDE 1 MG: 1 INJECTION, SOLUTION INTRAMUSCULAR; INTRAVENOUS; SUBCUTANEOUS at 13:03

## 2024-07-13 RX ADMIN — PANTOPRAZOLE SODIUM 40 MG: 40 INJECTION, POWDER, FOR SOLUTION INTRAVENOUS at 05:51

## 2024-07-13 RX ADMIN — POTASSIUM CHLORIDE AND SODIUM CHLORIDE 125 ML/HR: 450; 150 INJECTION, SOLUTION INTRAVENOUS at 18:11

## 2024-07-13 RX ADMIN — ACETAMINOPHEN 1000 MG: 500 TABLET ORAL at 14:07

## 2024-07-13 RX ADMIN — ALPRAZOLAM 0.25 MG: 0.25 TABLET ORAL at 21:31

## 2024-07-13 RX ADMIN — FOLIC ACID 200 ML/HR: 5 INJECTION, SOLUTION INTRAMUSCULAR; INTRAVENOUS; SUBCUTANEOUS at 03:34

## 2024-07-13 RX ADMIN — HYDROMORPHONE HYDROCHLORIDE 2 MG: 2 TABLET ORAL at 18:19

## 2024-07-13 RX ADMIN — HYDROMORPHONE HYDROCHLORIDE 2 MG: 2 TABLET ORAL at 11:51

## 2024-07-13 RX ADMIN — CYANOCOBALAMIN 1000 MCG: 1000 INJECTION, SOLUTION INTRAMUSCULAR; SUBCUTANEOUS at 08:26

## 2024-07-13 RX ADMIN — DOCUSATE SODIUM 100 MG: 100 CAPSULE, LIQUID FILLED ORAL at 16:27

## 2024-07-13 RX ADMIN — HYDROMORPHONE HYDROCHLORIDE 2 MG: 2 TABLET ORAL at 03:34

## 2024-07-13 RX ADMIN — PROCHLORPERAZINE MALEATE 10 MG: 10 TABLET ORAL at 16:37

## 2024-07-13 RX ADMIN — GABAPENTIN 100 MG: 100 CAPSULE ORAL at 08:26

## 2024-07-13 RX ADMIN — ONDANSETRON 4 MG: 2 INJECTION INTRAMUSCULAR; INTRAVENOUS at 13:03

## 2024-07-13 RX ADMIN — SIMETHICONE 80 MG: 80 TABLET, CHEWABLE ORAL at 02:32

## 2024-07-13 RX ADMIN — CETIRIZINE HYDROCHLORIDE 10 MG: 10 TABLET, FILM COATED ORAL at 08:26

## 2024-07-13 RX ADMIN — SIMETHICONE 80 MG: 80 TABLET, CHEWABLE ORAL at 17:17

## 2024-07-13 RX ADMIN — OXYCODONE HYDROCHLORIDE 5 MG: 5 TABLET ORAL at 01:16

## 2024-07-13 NOTE — PLAN OF CARE
Goal Outcome Evaluation:  Plan of Care Reviewed With: patient        Progress: improving  Outcome Evaluation: VSS on RA. A&O x4. PRNs given for abdominal pain and gas discomfort. PRNs given for nausea. Ambulating in [place] independently. Utilizing IS independently. IVFs infusing. CATINA drain to bulb suction required frequent emptying. Will continue plan of care.

## 2024-07-13 NOTE — PROGRESS NOTES
"Bariatric Surgery     LOS: 1 day   Patient Care Team:  Flory Suh APRN as PCP - General (Nurse Practitioner)  Miguel Arriaga MD as Consulting Physician (Obstetrics and Gynecology)    Chief Complaint: post op    Subjective     Interval History:      Around 5 PM yesterday, she had acute onset of lightheadedness when attempting to stand.  She tachycardia in the 120s, and hypotension with systolic in the 90s.  She was given 1 L bolus, stat hemoglobin was drawn, and she received 1 g of IV tranexamic acid.  At that time, her drain had also put out about 300 cc of blood.  Her vital signs improved and remained stable the rest of the night.  Hemoglobin dropped yesterday evening from 13.6 preop down to 10.2 and remained stable overnight.  This morning hemoglobin is 8.7.  She has not had any further lightheadedness, and vital signs are good this morning.    He complains of poorly controlled abdominal pain.  She vomited her pain pill earlier today, and has not had narcotics in a few hours.  Her nausea worsened after her upper GI.  She has had a few bites of each thing on her tray, but had emesis.  Ambulating without lightheadedness.  Voiding.  IS 2000.     Objective     Vital Signs  Blood pressure 162/98, pulse 88, temperature 98.3 °F (36.8 °C), temperature source Oral, resp. rate 16, height 167.6 cm (65.98\"), weight 104 kg (229 lb 4.5 oz), last menstrual period 07/12/2024, SpO2 99%.    Physical Exam:  General: Alert, NAD  Abdomen: soft, appropriate, incisions okay.  CATINA with dark blood, 1095 cc over 24 hours, no output reported for this shift.  Extremities: warm, (+) SCDs     Results Review:     I reviewed the patient's new clinical results.  I reviewed the patient's new imaging results and agree with the interpretation.    Labs:  Lab Results (last 24 hours)       Procedure Component Value Units Date/Time    Prealbumin [[id number]]  (Abnormal) Collected: 07/13/24 0827    Specimen: Blood Updated: 07/13/24 1242     " Prealbumin 16.5 mg/dL     Iron [[id number]]  (Abnormal) Collected: 07/13/24 0827    Specimen: Blood Updated: 07/13/24 1018     Iron 35 mcg/dL     CBC & Differential [[id number]]  (Abnormal) Collected: 07/13/24 0827    Specimen: Blood Updated: 07/13/24 0919    Narrative:      The following orders were created for panel order CBC & Differential.  Procedure                               Abnormality         Status                     ---------                               -----------         ------                     CBC Auto Differential[[id number]]        Abnormal            Final result                 Please view results for these tests on the individual orders.    CBC Auto Differential [[id number]]  (Abnormal) Collected: 07/13/24 0827    Specimen: Blood Updated: 07/13/24 0919     WBC 23.40 10*3/mm3      RBC 3.08 10*6/mm3      Hemoglobin 8.7 g/dL      Hematocrit 26.3 %      MCV 85.4 fL      MCH 28.2 pg      MCHC 33.1 g/dL      RDW 13.0 %      RDW-SD 39.6 fl      MPV 10.4 fL      Platelets 363 10*3/mm3      Neutrophil % 84.9 %      Lymphocyte % 6.7 %      Monocyte % 7.6 %      Eosinophil % 0.0 %      Basophil % 0.2 %      Immature Grans % 0.6 %      Neutrophils, Absolute 19.85 10*3/mm3      Lymphocytes, Absolute 1.57 10*3/mm3      Monocytes, Absolute 1.78 10*3/mm3      Eosinophils, Absolute 0.01 10*3/mm3      Basophils, Absolute 0.05 10*3/mm3      Immature Grans, Absolute 0.14 10*3/mm3      nRBC 0.0 /100 WBC     Comprehensive Metabolic Panel [[id number]]  (Abnormal) Collected: 07/13/24 0827    Specimen: Blood Updated: 07/13/24 0913     Glucose 113 mg/dL      BUN 6 mg/dL      Creatinine 0.60 mg/dL      Sodium 138 mmol/L      Potassium 4.4 mmol/L      Chloride 104 mmol/L      CO2 25.0 mmol/L      Calcium 8.1 mg/dL      Total Protein 5.7 g/dL      Albumin 3.7 g/dL      ALT (SGPT) 47 U/L      AST (SGOT) 43 U/L      Alkaline Phosphatase 50 U/L      Total Bilirubin 0.4 mg/dL      Globulin 2.0 gm/dL      Comment: Calculated  Result        A/G Ratio 1.9 g/dL      BUN/Creatinine Ratio 10.0     Anion Gap 9.0 mmol/L      eGFR 124.0 mL/min/1.73     Narrative:      GFR Normal >60  Chronic Kidney Disease <60  Kidney Failure <15      Hemoglobin [[id number]]  (Abnormal) Collected: 07/12/24 2100    Specimen: Blood Updated: 07/12/24 2120     Hemoglobin 10.2 g/dL     Hemoglobin & Hematocrit, Blood [[id number]]  (Abnormal) Collected: 07/12/24 1735    Specimen: Blood Updated: 07/12/24 1759     Hemoglobin 10.2 g/dL      Hematocrit 30.9 %             Imaging:  Imaging Results (Last 24 Hours)       Procedure Component Value Units Date/Time    FL Upper GI Single Contrast With KUB [[id number]] Resulted: 07/13/24 1108     Updated: 07/13/24 1111              Assessment & Plan     POD # 1 s/p robotic conversion of sleeve to Justino-en-Y gastric bypass, HHR with falciform ligament reinforcement    Doing a little better this morning.  UGI normal post-op, images and report reviewed.  IV iron given for low Fe.      Overnight hypotension/tachycardia with significant drop in Hgb this mornng c/w acute blood loss anemia after surgery.  Check q6 hr Hgb, hold Lovenox.  Transfuse for symptomatic anemia or Hgb <8.  Pt amenable to blood transfusion if needed.    Suspect most of hematoma has expressed out of CATINA drain.  Defer CT at this time.      Poorly controlled abdominal pain (she vomited pain pill) and nausea.  Poor oral intake.  Advised taking advantage of PRN antiemetics, drinking on a schedule, and add Flexeril for adjunct to narcotic.    Leukocytosis noted, afebrile.     Continue OOB/ PT/ mechanical DVT prophx.          Ellie Wolfe MD  07/13/24  12:56 EDT

## 2024-07-13 NOTE — PLAN OF CARE
Goal Outcome Evaluation:   VSS on RA. Patient had increased pain after her swallow study with the PO contrast around 12. I gave her PO dilaudid but patient told Dr. Wolfe she doesn't think she kept the pain medication down. I gave her IV Dilaudid 1mg and Zofran around 1pm, this helped her get her pain under control. She has walked x2 around the unit. Yeyo drain output has slowed down through out the day. She is starting her premiere protein for dinner. Gave iron due to iron being 35.    Aiden is concerned about patient   Hgb.   7/11-13.6  7/12-10.2  7/13-8.6  She is have another Hgb lab drawn at 1800.   Infuse <8   We will continue to monitor    Progress: improving

## 2024-07-14 VITALS
DIASTOLIC BLOOD PRESSURE: 98 MMHG | WEIGHT: 229.28 LBS | HEIGHT: 66 IN | TEMPERATURE: 98.1 F | RESPIRATION RATE: 18 BRPM | OXYGEN SATURATION: 93 % | HEART RATE: 95 BPM | SYSTOLIC BLOOD PRESSURE: 151 MMHG | BODY MASS INDEX: 36.85 KG/M2

## 2024-07-14 LAB
ALBUMIN SERPL-MCNC: 3.5 G/DL (ref 3.5–5.2)
ALBUMIN/GLOB SERPL: 1.5 G/DL
ALP SERPL-CCNC: 50 U/L (ref 39–117)
ALT SERPL W P-5'-P-CCNC: 50 U/L (ref 1–33)
ANION GAP SERPL CALCULATED.3IONS-SCNC: 10 MMOL/L (ref 5–15)
AST SERPL-CCNC: 51 U/L (ref 1–32)
BASOPHILS # BLD AUTO: 0.06 10*3/MM3 (ref 0–0.2)
BASOPHILS NFR BLD AUTO: 0.5 % (ref 0–1.5)
BILIRUB SERPL-MCNC: 0.8 MG/DL (ref 0–1.2)
BUN SERPL-MCNC: 5 MG/DL (ref 6–20)
BUN/CREAT SERPL: 9.4 (ref 7–25)
CALCIUM SPEC-SCNC: 7.9 MG/DL (ref 8.6–10.5)
CHLORIDE SERPL-SCNC: 104 MMOL/L (ref 98–107)
CO2 SERPL-SCNC: 24 MMOL/L (ref 22–29)
CREAT SERPL-MCNC: 0.53 MG/DL (ref 0.57–1)
DEPRECATED RDW RBC AUTO: 42 FL (ref 37–54)
EGFRCR SERPLBLD CKD-EPI 2021: 127.8 ML/MIN/1.73
EOSINOPHIL # BLD AUTO: 0.09 10*3/MM3 (ref 0–0.4)
EOSINOPHIL NFR BLD AUTO: 0.7 % (ref 0.3–6.2)
ERYTHROCYTE [DISTWIDTH] IN BLOOD BY AUTOMATED COUNT: 13.5 % (ref 12.3–15.4)
GLOBULIN UR ELPH-MCNC: 2.4 GM/DL
GLUCOSE SERPL-MCNC: 115 MG/DL (ref 65–99)
HCT VFR BLD AUTO: 24.7 % (ref 34–46.6)
HGB BLD-MCNC: 7.7 G/DL (ref 12–15.9)
HGB BLD-MCNC: 8.2 G/DL (ref 12–15.9)
HGB BLD-MCNC: 8.6 G/DL (ref 12–15.9)
HGB BLD-MCNC: 9.4 G/DL (ref 12–15.9)
IMM GRANULOCYTES # BLD AUTO: 0.06 10*3/MM3 (ref 0–0.05)
IMM GRANULOCYTES NFR BLD AUTO: 0.5 % (ref 0–0.5)
LYMPHOCYTES # BLD AUTO: 1.53 10*3/MM3 (ref 0.7–3.1)
LYMPHOCYTES NFR BLD AUTO: 11.8 % (ref 19.6–45.3)
MCH RBC QN AUTO: 28.2 PG (ref 26.6–33)
MCHC RBC AUTO-ENTMCNC: 33.2 G/DL (ref 31.5–35.7)
MCV RBC AUTO: 84.9 FL (ref 79–97)
MONOCYTES # BLD AUTO: 1.15 10*3/MM3 (ref 0.1–0.9)
MONOCYTES NFR BLD AUTO: 8.9 % (ref 5–12)
NEUTROPHILS NFR BLD AUTO: 10.04 10*3/MM3 (ref 1.7–7)
NEUTROPHILS NFR BLD AUTO: 77.6 % (ref 42.7–76)
NRBC BLD AUTO-RTO: 0 /100 WBC (ref 0–0.2)
PLATELET # BLD AUTO: 267 10*3/MM3 (ref 140–450)
PMV BLD AUTO: 9.6 FL (ref 6–12)
POTASSIUM SERPL-SCNC: 3.9 MMOL/L (ref 3.5–5.2)
PROT SERPL-MCNC: 5.9 G/DL (ref 6–8.5)
RBC # BLD AUTO: 2.91 10*6/MM3 (ref 3.77–5.28)
SODIUM SERPL-SCNC: 138 MMOL/L (ref 136–145)
WBC NRBC COR # BLD AUTO: 12.93 10*3/MM3 (ref 3.4–10.8)

## 2024-07-14 PROCEDURE — 85025 COMPLETE CBC W/AUTO DIFF WBC: CPT | Performed by: SURGERY

## 2024-07-14 PROCEDURE — 63710000001 ONDANSETRON ODT 4 MG TABLET DISPERSIBLE: Performed by: SURGERY

## 2024-07-14 PROCEDURE — 80053 COMPREHEN METABOLIC PANEL: CPT | Performed by: SURGERY

## 2024-07-14 PROCEDURE — 86900 BLOOD TYPING SEROLOGIC ABO: CPT

## 2024-07-14 PROCEDURE — 36430 TRANSFUSION BLD/BLD COMPNT: CPT

## 2024-07-14 PROCEDURE — P9016 RBC LEUKOCYTES REDUCED: HCPCS

## 2024-07-14 PROCEDURE — 99024 POSTOP FOLLOW-UP VISIT: CPT | Performed by: SURGERY

## 2024-07-14 PROCEDURE — 85018 HEMOGLOBIN: CPT | Performed by: SURGERY

## 2024-07-14 RX ORDER — CYCLOBENZAPRINE HCL 10 MG
10 TABLET ORAL EVERY 8 HOURS SCHEDULED
Qty: 45 TABLET | Refills: 0 | Status: SHIPPED | OUTPATIENT
Start: 2024-07-14

## 2024-07-14 RX ADMIN — ONDANSETRON 4 MG: 4 TABLET, ORALLY DISINTEGRATING ORAL at 15:58

## 2024-07-14 RX ADMIN — HYDROMORPHONE HYDROCHLORIDE 2 MG: 2 TABLET ORAL at 04:18

## 2024-07-14 RX ADMIN — HYDROMORPHONE HYDROCHLORIDE 2 MG: 2 TABLET ORAL at 17:33

## 2024-07-14 RX ADMIN — ACETAMINOPHEN 1000 MG: 500 TABLET ORAL at 05:38

## 2024-07-14 RX ADMIN — DOCUSATE SODIUM 100 MG: 100 CAPSULE, LIQUID FILLED ORAL at 08:27

## 2024-07-14 RX ADMIN — PANTOPRAZOLE SODIUM 40 MG: 40 INJECTION, POWDER, FOR SOLUTION INTRAVENOUS at 05:39

## 2024-07-14 RX ADMIN — SIMETHICONE 80 MG: 80 TABLET, CHEWABLE ORAL at 14:22

## 2024-07-14 RX ADMIN — SIMETHICONE 80 MG: 80 TABLET, CHEWABLE ORAL at 08:31

## 2024-07-14 RX ADMIN — CYCLOBENZAPRINE 10 MG: 10 TABLET, FILM COATED ORAL at 14:19

## 2024-07-14 RX ADMIN — CYCLOBENZAPRINE 10 MG: 10 TABLET, FILM COATED ORAL at 06:21

## 2024-07-14 RX ADMIN — HYDROMORPHONE HYDROCHLORIDE 2 MG: 2 TABLET ORAL at 09:12

## 2024-07-14 RX ADMIN — CETIRIZINE HYDROCHLORIDE 10 MG: 10 TABLET, FILM COATED ORAL at 08:27

## 2024-07-14 RX ADMIN — LOSARTAN POTASSIUM 25 MG: 25 TABLET, FILM COATED ORAL at 08:27

## 2024-07-14 RX ADMIN — GABAPENTIN 100 MG: 100 CAPSULE ORAL at 08:27

## 2024-07-14 NOTE — DISCHARGE SUMMARY
"Bariatric Surgery     LOS: 2 days   Patient Care Team:  Flory Suh APRN as PCP - General (Nurse Practitioner)  Miguel Arriaga MD as Consulting Physician (Obstetrics and Gynecology)    Chief Complaint: post op    Subjective     Interval History:      She feels much better today.  No further vomiting since UGI.  Flexeril helped her abdominal pain.  Trying to avoid narcotics.  Has had 1 hour of 4 oz/hour oral intake so far today.  Ambulating.  Voiding.  Afebrile.  IS 2000. No BM or flatus yet, but feels \"rumblings\" when she sits on the toilet.    Objective     Vital Signs  Blood pressure 151/98, pulse 99, temperature 98.1 °F (36.7 °C), temperature source Oral, resp. rate 18, height 167.6 cm (65.98\"), weight 104 kg (229 lb 4.5 oz), last menstrual period 07/12/2024, SpO2 93%.    Physical Exam:  General: Alert, NAD.  No pallor.  Abdomen: soft, appropriate, incisions okay.  Normoactive bowel sounds.  CATINA with small amount of dark blood, 100 cc over 24 hours, no output reported for this shift.  Extremities: warm, (+) SCDs     Results Review:     I reviewed the patient's new clinical results.  I reviewed the patient's new imaging results and agree with the interpretation.    Labs:  Lab Results (last 24 hours)       Procedure Component Value Units Date/Time    Hemoglobin [[id number]]  (Abnormal) Collected: 07/14/24 1247    Specimen: Blood Updated: 07/14/24 1255     Hemoglobin 8.6 g/dL     Comprehensive Metabolic Panel [[id number]]  (Abnormal) Collected: 07/14/24 0603    Specimen: Blood Updated: 07/14/24 0710     Glucose 115 mg/dL      BUN 5 mg/dL      Creatinine 0.53 mg/dL      Sodium 138 mmol/L      Potassium 3.9 mmol/L      Chloride 104 mmol/L      CO2 24.0 mmol/L      Calcium 7.9 mg/dL      Total Protein 5.9 g/dL      Albumin 3.5 g/dL      ALT (SGPT) 50 U/L      AST (SGOT) 51 U/L      Alkaline Phosphatase 50 U/L      Total Bilirubin 0.8 mg/dL      Globulin 2.4 gm/dL      Comment: Calculated Result        A/G Ratio " 1.5 g/dL      BUN/Creatinine Ratio 9.4     Anion Gap 10.0 mmol/L      eGFR 127.8 mL/min/1.73     Narrative:      GFR Normal >60  Chronic Kidney Disease <60  Kidney Failure <15      CBC & Differential [[id number]]  (Abnormal) Collected: 07/14/24 0603    Specimen: Blood Updated: 07/14/24 0640    Narrative:      The following orders were created for panel order CBC & Differential.  Procedure                               Abnormality         Status                     ---------                               -----------         ------                     CBC Auto Differential[[id number]]        Abnormal            Final result                 Please view results for these tests on the individual orders.    CBC Auto Differential [[id number]]  (Abnormal) Collected: 07/14/24 0603    Specimen: Blood Updated: 07/14/24 0640     WBC 12.93 10*3/mm3      RBC 2.91 10*6/mm3      Hemoglobin 8.2 g/dL      Hematocrit 24.7 %      MCV 84.9 fL      MCH 28.2 pg      MCHC 33.2 g/dL      RDW 13.5 %      RDW-SD 42.0 fl      MPV 9.6 fL      Platelets 267 10*3/mm3      Neutrophil % 77.6 %      Lymphocyte % 11.8 %      Monocyte % 8.9 %      Eosinophil % 0.7 %      Basophil % 0.5 %      Immature Grans % 0.5 %      Neutrophils, Absolute 10.04 10*3/mm3      Lymphocytes, Absolute 1.53 10*3/mm3      Monocytes, Absolute 1.15 10*3/mm3      Eosinophils, Absolute 0.09 10*3/mm3      Basophils, Absolute 0.06 10*3/mm3      Immature Grans, Absolute 0.06 10*3/mm3      nRBC 0.0 /100 WBC     Hemoglobin [[id number]]  (Abnormal) Collected: 07/14/24 0009    Specimen: Blood Updated: 07/14/24 0038     Hemoglobin 7.7 g/dL     Hemoglobin [[id number]]  (Abnormal) Collected: 07/13/24 1802    Specimen: Blood Updated: 07/13/24 1807     Hemoglobin 8.1 g/dL             Imaging:  Imaging Results (Last 24 Hours)       Procedure Component Value Units Date/Time    FL Upper GI Single Contrast With KUB [[id number]] Collected: 07/13/24 1402     Updated: 07/13/24 1413    Narrative:       FL UPPER GI SINGLE CONTRAST W KUB    Date of Exam: 7/13/2024 11:08 AM EDT    Indication: gastric bypass hiatal hernia repair water soluble.       Comparison: None available.    Technique:   radiograph of the abdomen was obtained. Under fluoroscopic observation, the patient ingested thin barium contrast. Fluoroscopic imaging was obtained through the upper gastrointestinal tract.    Fluoroscopic Time: 84 seconds    Number of Images: 8 fluoroscopic loops    Findings:   imaging demonstrates a surgical drain overlying the stomach. the patient ingested water-soluble contrast with grossly normal appearance of the esophageal mucosa and normal esophageal transit. contrast subsequently opacified stomach and small bowel   consistent with changes of prior partial sleeve gastrectomy and subsequent gastric bypass. proximal small bowel loops are somewhat dilated and there is delayed transit suggesting potential ileus or ongoing partial obstruction. there is no extravasation   of contrast to suggest leak.      Impression:      Impression:    No evidence of contrast extravasation concerning for leak. persistent delayed transit of contrast and distention of small bowel segments, likely postoperative ileus or persistent partial obstruction.      Electronically Signed: Zana Smith MD    7/13/2024 2:10 PM EDT    Workstation ID: [id number]              Assessment & Plan     POD # 2 s/p robotic conversion of sleeve to Justino-en-Y gastric bypass, HHR with falciform ligament reinforcement    Doing much better today. Hgb has stabilized, and she is asymptomatic.  Bloody CATINA drainage has stopped, not emptied since yesterday.  She is tolerating diet, although I would like to see a few more hours of good oral intake.  I offered to stay another night, but she reports she would rest better at home and feels well.      If oral intake is good over the next 4 hours, will discharge home with follow up in 1 week with PA.  Rx given for Flexeril.   Continue antiemetics and PPI she already has at home.   Discharge instructions reviewed with patient and her  and all questions answered.            Ellie Wolfe MD  07/14/24  13:51 EDT

## 2024-07-14 NOTE — PLAN OF CARE
Problem: Adult Inpatient Plan of Care  Goal: Plan of Care Review  Outcome: Ongoing, Progressing  Flowsheets  Taken 7/14/2024 0431 by Estrella Fuentes RN  Plan of Care Reviewed With:   patient   spouse  Outcome Evaluation: Pt initially tachycardic at the beginning of the shift. 1 unit PRBCs infused for a Hgb of 7.7. VSS and patient NSR since transfusion. She is independent in the room and pain controlled with PRN analgesics. CATINA drain putting out serosanguinous fluid.  Taken 7/13/2024 1652 by Lorri Marin RN  Progress: improving  Goal: Patient-Specific Goal (Individualized)  Outcome: Ongoing, Progressing  Goal: Absence of Hospital-Acquired Illness or Injury  Outcome: Ongoing, Progressing  Intervention: Identify and Manage Fall Risk  Recent Flowsheet Documentation  Taken 7/14/2024 0221 by Estrella Fuentes RN  Safety Promotion/Fall Prevention:   safety round/check completed   room organization consistent   nonskid shoes/slippers when out of bed   lighting adjusted   fall prevention program maintained   clutter free environment maintained   assistive device/personal items within reach   activity supervised  Taken 7/13/2024 2200 by Estrella Fuentes RN  Safety Promotion/Fall Prevention:   safety round/check completed   room organization consistent   nonskid shoes/slippers when out of bed   lighting adjusted   fall prevention program maintained   clutter free environment maintained   assistive device/personal items within reach   activity supervised  Taken 7/13/2024 2000 by Estrella Fuentes, RN  Safety Promotion/Fall Prevention:   safety round/check completed   room organization consistent   nonskid shoes/slippers when out of bed   lighting adjusted   fall prevention program maintained   assistive device/personal items within reach   clutter free environment maintained   activity supervised  Intervention: Prevent Skin Injury  Recent Flowsheet Documentation  Taken 7/14/2024 0221 by Estrella Fuentes, TIMOTHY  Body  Position: position changed independently  Taken 7/14/2024 0000 by Estrella Fuentes RN  Body Position: position changed independently  Taken 7/13/2024 2200 by Estrella Fuentes RN  Body Position: position changed independently  Taken 7/13/2024 2000 by Estrella Fuentes RN  Body Position: position changed independently  Intervention: Prevent and Manage VTE (Venous Thromboembolism) Risk  Recent Flowsheet Documentation  Taken 7/14/2024 0221 by Estrella Fuentes RN  Activity Management: activity encouraged  Taken 7/14/2024 0000 by Estrella Fuentes RN  Activity Management: activity encouraged  Taken 7/13/2024 2200 by Estrella Fuentes RN  Activity Management: activity encouraged  Taken 7/13/2024 2000 by Estrella uFentes RN  Activity Management: activity encouraged  VTE Prevention/Management:   bilateral   sequential compression devices on  Intervention: Prevent Infection  Recent Flowsheet Documentation  Taken 7/14/2024 0000 by Estrella Fuentes RN  Infection Prevention:   environmental surveillance performed   rest/sleep promoted  Taken 7/13/2024 2200 by Estrella Fuentes RN  Infection Prevention:   environmental surveillance performed   rest/sleep promoted  Taken 7/13/2024 2000 by Estrella Fuentes RN  Infection Prevention:   environmental surveillance performed   rest/sleep promoted  Goal: Optimal Comfort and Wellbeing  Outcome: Ongoing, Progressing  Intervention: Monitor Pain and Promote Comfort  Recent Flowsheet Documentation  Taken 7/14/2024 0400 by Estrella Fuentes RN  Pain Management Interventions: quiet environment facilitated  Taken 7/14/2024 0000 by Estrella Fuentes RN  Pain Management Interventions: quiet environment facilitated  Taken 7/13/2024 2000 by Estrella Fuentes RN  Pain Management Interventions: see MAR  Intervention: Provide Person-Centered Care  Recent Flowsheet Documentation  Taken 7/13/2024 2000 by Estrella Fuentes RN  Trust Relationship/Rapport:   care explained   choices  provided   emotional support provided   empathic listening provided   questions answered   questions encouraged   reassurance provided   thoughts/feelings acknowledged  Goal: Readiness for Transition of Care  Outcome: Ongoing, Progressing     Problem: Bariatric Care Environmental Safety (Bariatric Surgery)  Goal: Safety Maintained with Care  Outcome: Ongoing, Progressing     Problem: Bleeding (Bariatric Surgery)  Goal: Absence of Bleeding  Outcome: Ongoing, Progressing     Problem: Bowel Motility Impaired (Bariatric Surgery)  Goal: Effective Bowel Motility and Elimination  Outcome: Ongoing, Progressing     Problem: Fluid and Electrolyte Imbalance (Bariatric Surgery)  Goal: Fluid and Electrolyte Balance  Outcome: Ongoing, Progressing     Problem: Glycemic Control Impaired (Bariatric Surgery)  Goal: Blood Glucose Level Within Desired Range  Outcome: Ongoing, Progressing     Problem: Infection (Bariatric Surgery)  Goal: Absence of Infection Signs and Symptoms  Outcome: Ongoing, Progressing     Problem: Ongoing Anesthesia Effects (Bariatric Surgery)  Goal: Anesthesia/Sedation Recovery  Outcome: Ongoing, Progressing  Intervention: Optimize Anesthesia Recovery  Recent Flowsheet Documentation  Taken 7/14/2024 0221 by Estrella Fuentes, RN  Safety Promotion/Fall Prevention:   safety round/check completed   room organization consistent   nonskid shoes/slippers when out of bed   lighting adjusted   fall prevention program maintained   clutter free environment maintained   assistive device/personal items within reach   activity supervised  Taken 7/13/2024 2200 by Estrella Fuentes, RN  Safety Promotion/Fall Prevention:   safety round/check completed   room organization consistent   nonskid shoes/slippers when out of bed   lighting adjusted   fall prevention program maintained   clutter free environment maintained   assistive device/personal items within reach   activity supervised  Taken 7/13/2024 2000 by Estrella Fuentes,  RN  Patient Tolerance (IS): good  Safety Promotion/Fall Prevention:   safety round/check completed   room organization consistent   nonskid shoes/slippers when out of bed   lighting adjusted   fall prevention program maintained   assistive device/personal items within reach   clutter free environment maintained   activity supervised     Problem: Pain (Bariatric Surgery)  Goal: Acceptable Pain Control  Outcome: Ongoing, Progressing  Intervention: Prevent or Manage Pain  Recent Flowsheet Documentation  Taken 7/14/2024 0400 by Estrella Fuentes RN  Pain Management Interventions: quiet environment facilitated  Taken 7/14/2024 0000 by Estrella Fuentes RN  Pain Management Interventions: quiet environment facilitated  Taken 7/13/2024 2000 by Estrella Fuentes RN  Pain Management Interventions: see MAR  Diversional Activities:   television   smartphone     Problem: Postoperative Nausea and Vomiting (Bariatric Surgery)  Goal: Nausea and Vomiting Relief  Outcome: Ongoing, Progressing     Problem: Postoperative Urinary Retention (Bariatric Surgery)  Goal: Effective Urinary Elimination  Outcome: Ongoing, Progressing     Problem: Respiratory Compromise (Bariatric Surgery)  Goal: Effective Oxygenation and Ventilation  Outcome: Ongoing, Progressing  Intervention: Optimize Oxygenation and Ventilation  Recent Flowsheet Documentation  Taken 7/14/2024 0221 by Estrella Fuentes, RN  Head of Bed (HOB) Positioning: HOB elevated  Taken 7/14/2024 0000 by Estrella Fuentes RN  Head of Bed (HOB) Positioning: HOB elevated  Taken 7/13/2024 2200 by Estrella Fuentes RN  Head of Bed (HOB) Positioning: HOB elevated  Taken 7/13/2024 2000 by Estrella Fuentes RN  Head of Bed (HOB) Positioning: HOB elevated   Goal Outcome Evaluation:  Plan of Care Reviewed With: patient, spouse           Outcome Evaluation: Pt initially tachycardic at the beginning of the shift. 1 unit PRBCs infused for a Hgb of 7.7. VSS and patient NSR since transfusion. She  is independent in the room and pain controlled with PRN analgesics. CATINA drain putting out serosanguinous fluid.

## 2024-07-15 LAB
BH BB BLOOD EXPIRATION DATE: NORMAL
BH BB BLOOD TYPE BARCODE: 7300
BH BB DISPENSE STATUS: NORMAL
BH BB PRODUCT CODE: NORMAL
BH BB UNIT NUMBER: NORMAL
CROSSMATCH INTERPRETATION: NORMAL
UNIT  ABO: NORMAL
UNIT  RH: NORMAL

## 2024-07-17 ENCOUNTER — APPOINTMENT (OUTPATIENT)
Dept: CT IMAGING | Facility: HOSPITAL | Age: 29
End: 2024-07-17
Payer: COMMERCIAL

## 2024-07-17 ENCOUNTER — HOSPITAL ENCOUNTER (OUTPATIENT)
Facility: HOSPITAL | Age: 29
Setting detail: OBSERVATION
LOS: 1 days | Discharge: HOME | End: 2024-07-19
Attending: EMERGENCY MEDICINE | Admitting: SURGERY
Payer: COMMERCIAL

## 2024-07-17 DIAGNOSIS — Z98.84 HISTORY OF ROUX-EN-Y GASTRIC BYPASS: ICD-10-CM

## 2024-07-17 DIAGNOSIS — R10.9 ACUTE ABDOMINAL PAIN: ICD-10-CM

## 2024-07-17 DIAGNOSIS — E86.0 DEHYDRATION: Primary | ICD-10-CM

## 2024-07-17 DIAGNOSIS — K56.609 SMALL BOWEL OBSTRUCTION: ICD-10-CM

## 2024-07-17 DIAGNOSIS — Z02.89 REFERRED BY HEALTH CARE PROFESSIONAL: ICD-10-CM

## 2024-07-17 DIAGNOSIS — R91.1 PULMONARY NODULE: ICD-10-CM

## 2024-07-17 LAB
ALBUMIN SERPL-MCNC: 4 G/DL (ref 3.5–5.2)
ALBUMIN/GLOB SERPL: 1.2 G/DL
ALP SERPL-CCNC: 84 U/L (ref 39–117)
ALT SERPL W P-5'-P-CCNC: 30 U/L (ref 1–33)
ANION GAP SERPL CALCULATED.3IONS-SCNC: 19 MMOL/L (ref 5–15)
AST SERPL-CCNC: 19 U/L (ref 1–32)
BACTERIA UR QL AUTO: ABNORMAL /HPF
BASOPHILS # BLD AUTO: 0.04 10*3/MM3 (ref 0–0.2)
BASOPHILS NFR BLD AUTO: 0.4 % (ref 0–1.5)
BILIRUB SERPL-MCNC: 1 MG/DL (ref 0–1.2)
BILIRUB UR QL STRIP: NEGATIVE
BUN SERPL-MCNC: 12 MG/DL (ref 6–20)
BUN/CREAT SERPL: 17.4 (ref 7–25)
CALCIUM SPEC-SCNC: 9.2 MG/DL (ref 8.6–10.5)
CHLORIDE SERPL-SCNC: 99 MMOL/L (ref 98–107)
CLARITY UR: ABNORMAL
CO2 SERPL-SCNC: 18 MMOL/L (ref 22–29)
COLOR UR: YELLOW
CREAT SERPL-MCNC: 0.69 MG/DL (ref 0.57–1)
D DIMER PPP FEU-MCNC: 4.21 MCGFEU/ML (ref 0–0.5)
D-LACTATE SERPL-SCNC: 1.1 MMOL/L (ref 0.5–2)
DEPRECATED RDW RBC AUTO: 41.9 FL (ref 37–54)
EGFRCR SERPLBLD CKD-EPI 2021: 119.9 ML/MIN/1.73
EOSINOPHIL # BLD AUTO: 0.13 10*3/MM3 (ref 0–0.4)
EOSINOPHIL NFR BLD AUTO: 1.1 % (ref 0.3–6.2)
ERYTHROCYTE [DISTWIDTH] IN BLOOD BY AUTOMATED COUNT: 13.8 % (ref 12.3–15.4)
GLOBULIN UR ELPH-MCNC: 3.3 GM/DL
GLUCOSE SERPL-MCNC: 88 MG/DL (ref 65–99)
GLUCOSE UR STRIP-MCNC: NEGATIVE MG/DL
HCT VFR BLD AUTO: 34.1 % (ref 34–46.6)
HGB BLD-MCNC: 11.1 G/DL (ref 12–15.9)
HGB UR QL STRIP.AUTO: NEGATIVE
HYALINE CASTS UR QL AUTO: ABNORMAL /LPF
IMM GRANULOCYTES # BLD AUTO: 0.11 10*3/MM3 (ref 0–0.05)
IMM GRANULOCYTES NFR BLD AUTO: 1 % (ref 0–0.5)
KETONES UR QL STRIP: ABNORMAL
LEUKOCYTE ESTERASE UR QL STRIP.AUTO: NEGATIVE
LIPASE SERPL-CCNC: 64 U/L (ref 13–60)
LYMPHOCYTES # BLD AUTO: 1.21 10*3/MM3 (ref 0.7–3.1)
LYMPHOCYTES NFR BLD AUTO: 10.7 % (ref 19.6–45.3)
MCH RBC QN AUTO: 28.4 PG (ref 26.6–33)
MCHC RBC AUTO-ENTMCNC: 32.6 G/DL (ref 31.5–35.7)
MCV RBC AUTO: 87.2 FL (ref 79–97)
MONOCYTES # BLD AUTO: 1.3 10*3/MM3 (ref 0.1–0.9)
MONOCYTES NFR BLD AUTO: 11.5 % (ref 5–12)
NEUTROPHILS NFR BLD AUTO: 75.3 % (ref 42.7–76)
NEUTROPHILS NFR BLD AUTO: 8.52 10*3/MM3 (ref 1.7–7)
NITRITE UR QL STRIP: NEGATIVE
NRBC BLD AUTO-RTO: 0 /100 WBC (ref 0–0.2)
NT-PROBNP SERPL-MCNC: <36 PG/ML (ref 0–450)
PH UR STRIP.AUTO: 5.5 [PH] (ref 5–8)
PLATELET # BLD AUTO: 459 10*3/MM3 (ref 140–450)
PMV BLD AUTO: 9.3 FL (ref 6–12)
POTASSIUM SERPL-SCNC: 4.1 MMOL/L (ref 3.5–5.2)
PROT SERPL-MCNC: 7.3 G/DL (ref 6–8.5)
PROT UR QL STRIP: ABNORMAL
QT INTERVAL: 306 MS
QTC INTERVAL: 451 MS
RBC # BLD AUTO: 3.91 10*6/MM3 (ref 3.77–5.28)
RBC # UR STRIP: ABNORMAL /HPF
REF LAB TEST METHOD: ABNORMAL
SODIUM SERPL-SCNC: 136 MMOL/L (ref 136–145)
SP GR UR STRIP: 1.02 (ref 1–1.03)
SQUAMOUS #/AREA URNS HPF: ABNORMAL /HPF
TROPONIN T SERPL HS-MCNC: 7 NG/L
UROBILINOGEN UR QL STRIP: ABNORMAL
WBC # UR STRIP: ABNORMAL /HPF
WBC NRBC COR # BLD AUTO: 11.31 10*3/MM3 (ref 3.4–10.8)

## 2024-07-17 PROCEDURE — G0378 HOSPITAL OBSERVATION PER HR: HCPCS

## 2024-07-17 PROCEDURE — 93005 ELECTROCARDIOGRAM TRACING: CPT | Performed by: NURSE PRACTITIONER

## 2024-07-17 PROCEDURE — 85379 FIBRIN DEGRADATION QUANT: CPT | Performed by: NURSE PRACTITIONER

## 2024-07-17 PROCEDURE — 85025 COMPLETE CBC W/AUTO DIFF WBC: CPT | Performed by: NURSE PRACTITIONER

## 2024-07-17 PROCEDURE — 96361 HYDRATE IV INFUSION ADD-ON: CPT

## 2024-07-17 PROCEDURE — 25510000001 IOPAMIDOL PER 1 ML: Performed by: EMERGENCY MEDICINE

## 2024-07-17 PROCEDURE — 80053 COMPREHEN METABOLIC PANEL: CPT | Performed by: NURSE PRACTITIONER

## 2024-07-17 PROCEDURE — 83690 ASSAY OF LIPASE: CPT | Performed by: NURSE PRACTITIONER

## 2024-07-17 PROCEDURE — 0 DIATRIZOATE MEGLUMINE & SODIUM PER 1 ML: Performed by: NURSE PRACTITIONER

## 2024-07-17 PROCEDURE — 25010000002 ONDANSETRON PER 1 MG

## 2024-07-17 PROCEDURE — 25010000002 ONDANSETRON PER 1 MG: Performed by: NURSE PRACTITIONER

## 2024-07-17 PROCEDURE — 25810000003 SODIUM CHLORIDE 0.9 % SOLUTION: Performed by: NURSE PRACTITIONER

## 2024-07-17 PROCEDURE — 25010000002 THIAMINE HCL 200 MG/2ML SOLUTION: Performed by: SURGERY

## 2024-07-17 PROCEDURE — 99285 EMERGENCY DEPT VISIT HI MDM: CPT

## 2024-07-17 PROCEDURE — 96374 THER/PROPH/DIAG INJ IV PUSH: CPT

## 2024-07-17 PROCEDURE — 25810000003 SODIUM CHLORIDE 0.9 % SOLUTION 1,000 ML FLEX CONT: Performed by: SURGERY

## 2024-07-17 PROCEDURE — 83605 ASSAY OF LACTIC ACID: CPT | Performed by: NURSE PRACTITIONER

## 2024-07-17 PROCEDURE — 96375 TX/PRO/DX INJ NEW DRUG ADDON: CPT

## 2024-07-17 PROCEDURE — 25010000002 THIAMINE PER 100 MG: Performed by: SURGERY

## 2024-07-17 PROCEDURE — 74177 CT ABD & PELVIS W/CONTRAST: CPT

## 2024-07-17 PROCEDURE — 25010000002 PROCHLORPERAZINE 10 MG/2ML SOLUTION: Performed by: NURSE PRACTITIONER

## 2024-07-17 PROCEDURE — 84484 ASSAY OF TROPONIN QUANT: CPT | Performed by: NURSE PRACTITIONER

## 2024-07-17 PROCEDURE — 83880 ASSAY OF NATRIURETIC PEPTIDE: CPT | Performed by: NURSE PRACTITIONER

## 2024-07-17 PROCEDURE — 71275 CT ANGIOGRAPHY CHEST: CPT

## 2024-07-17 PROCEDURE — 81001 URINALYSIS AUTO W/SCOPE: CPT | Performed by: NURSE PRACTITIONER

## 2024-07-17 RX ORDER — PROCHLORPERAZINE MALEATE 10 MG
10 TABLET ORAL EVERY 6 HOURS PRN
Status: DISCONTINUED | OUTPATIENT
Start: 2024-07-17 | End: 2024-07-19 | Stop reason: HOSPADM

## 2024-07-17 RX ORDER — ONDANSETRON 2 MG/ML
4 INJECTION INTRAMUSCULAR; INTRAVENOUS EVERY 6 HOURS PRN
Status: DISCONTINUED | OUTPATIENT
Start: 2024-07-17 | End: 2024-07-19 | Stop reason: HOSPADM

## 2024-07-17 RX ORDER — THIAMINE HYDROCHLORIDE 100 MG/ML
100 INJECTION, SOLUTION INTRAMUSCULAR; INTRAVENOUS DAILY
Status: DISCONTINUED | OUTPATIENT
Start: 2024-07-17 | End: 2024-07-19 | Stop reason: HOSPADM

## 2024-07-17 RX ORDER — SODIUM CHLORIDE 0.9 % (FLUSH) 0.9 %
10 SYRINGE (ML) INJECTION AS NEEDED
Status: DISCONTINUED | OUTPATIENT
Start: 2024-07-17 | End: 2024-07-19 | Stop reason: HOSPADM

## 2024-07-17 RX ORDER — ONDANSETRON 2 MG/ML
INJECTION INTRAMUSCULAR; INTRAVENOUS
Status: COMPLETED
Start: 2024-07-17 | End: 2024-07-17

## 2024-07-17 RX ORDER — ONDANSETRON 4 MG/1
4 TABLET, ORALLY DISINTEGRATING ORAL EVERY 8 HOURS PRN
COMMUNITY

## 2024-07-17 RX ORDER — OXYCODONE HYDROCHLORIDE 5 MG/1
5 TABLET ORAL EVERY 6 HOURS PRN
Status: DISCONTINUED | OUTPATIENT
Start: 2024-07-17 | End: 2024-07-19 | Stop reason: HOSPADM

## 2024-07-17 RX ORDER — FERROUS SULFATE 325(65) MG
325 TABLET ORAL
COMMUNITY

## 2024-07-17 RX ORDER — PANTOPRAZOLE SODIUM 40 MG/10ML
40 INJECTION, POWDER, LYOPHILIZED, FOR SOLUTION INTRAVENOUS EVERY 12 HOURS SCHEDULED
Status: DISCONTINUED | OUTPATIENT
Start: 2024-07-17 | End: 2024-07-19 | Stop reason: HOSPADM

## 2024-07-17 RX ORDER — CYCLOBENZAPRINE HCL 10 MG
10 TABLET ORAL EVERY 8 HOURS SCHEDULED
Status: DISCONTINUED | OUTPATIENT
Start: 2024-07-17 | End: 2024-07-19 | Stop reason: HOSPADM

## 2024-07-17 RX ORDER — PROCHLORPERAZINE EDISYLATE 5 MG/ML
10 INJECTION INTRAMUSCULAR; INTRAVENOUS ONCE
Status: COMPLETED | OUTPATIENT
Start: 2024-07-17 | End: 2024-07-17

## 2024-07-17 RX ORDER — CYCLOBENZAPRINE HCL 10 MG
10 TABLET ORAL 3 TIMES DAILY PRN
Status: DISCONTINUED | OUTPATIENT
Start: 2024-07-17 | End: 2024-07-19 | Stop reason: HOSPADM

## 2024-07-17 RX ORDER — SODIUM CHLORIDE 9 MG/ML
150 INJECTION, SOLUTION INTRAVENOUS CONTINUOUS
Status: DISCONTINUED | OUTPATIENT
Start: 2024-07-17 | End: 2024-07-18

## 2024-07-17 RX ORDER — ONDANSETRON 2 MG/ML
4 INJECTION INTRAMUSCULAR; INTRAVENOUS ONCE
Status: COMPLETED | OUTPATIENT
Start: 2024-07-17 | End: 2024-07-17

## 2024-07-17 RX ORDER — PROCHLORPERAZINE EDISYLATE 5 MG/ML
10 INJECTION INTRAMUSCULAR; INTRAVENOUS EVERY 6 HOURS PRN
Status: DISCONTINUED | OUTPATIENT
Start: 2024-07-17 | End: 2024-07-19 | Stop reason: HOSPADM

## 2024-07-17 RX ORDER — HYDROMORPHONE HYDROCHLORIDE 1 MG/ML
0.5 INJECTION, SOLUTION INTRAMUSCULAR; INTRAVENOUS; SUBCUTANEOUS
Status: DISCONTINUED | OUTPATIENT
Start: 2024-07-17 | End: 2024-07-19 | Stop reason: HOSPADM

## 2024-07-17 RX ORDER — ONDANSETRON 4 MG/1
4 TABLET, ORALLY DISINTEGRATING ORAL EVERY 6 HOURS PRN
Status: DISCONTINUED | OUTPATIENT
Start: 2024-07-21 | End: 2024-07-19 | Stop reason: HOSPADM

## 2024-07-17 RX ORDER — ACETAMINOPHEN 500 MG
500 TABLET ORAL EVERY 6 HOURS PRN
COMMUNITY

## 2024-07-17 RX ORDER — HYDROMORPHONE HYDROCHLORIDE 2 MG/1
2 TABLET ORAL EVERY 4 HOURS PRN
Status: DISCONTINUED | OUTPATIENT
Start: 2024-07-17 | End: 2024-07-19 | Stop reason: HOSPADM

## 2024-07-17 RX ORDER — PROMETHAZINE HYDROCHLORIDE 12.5 MG/1
12.5 TABLET ORAL EVERY 6 HOURS PRN
Status: DISCONTINUED | OUTPATIENT
Start: 2024-07-17 | End: 2024-07-19 | Stop reason: HOSPADM

## 2024-07-17 RX ORDER — NALOXONE HCL 0.4 MG/ML
0.1 VIAL (ML) INJECTION
Status: DISCONTINUED | OUTPATIENT
Start: 2024-07-17 | End: 2024-07-19 | Stop reason: HOSPADM

## 2024-07-17 RX ADMIN — SODIUM CHLORIDE 1000 ML: 9 INJECTION, SOLUTION INTRAVENOUS at 10:50

## 2024-07-17 RX ADMIN — PANTOPRAZOLE SODIUM 40 MG: 40 INJECTION, POWDER, LYOPHILIZED, FOR SOLUTION INTRAVENOUS at 15:40

## 2024-07-17 RX ADMIN — DIATRIZOATE MEGLUMINE AND DIATRIZOATE SODIUM 15 ML: 660; 100 LIQUID ORAL; RECTAL at 12:04

## 2024-07-17 RX ADMIN — SODIUM CHLORIDE 1000 ML: 9 INJECTION, SOLUTION INTRAVENOUS at 09:32

## 2024-07-17 RX ADMIN — CYCLOBENZAPRINE 10 MG: 10 TABLET, FILM COATED ORAL at 17:39

## 2024-07-17 RX ADMIN — IOPAMIDOL 85 ML: 755 INJECTION, SOLUTION INTRAVENOUS at 13:10

## 2024-07-17 RX ADMIN — ONDANSETRON 4 MG: 2 INJECTION INTRAMUSCULAR; INTRAVENOUS at 09:31

## 2024-07-17 RX ADMIN — SODIUM CHLORIDE 150 ML/HR: 9 INJECTION, SOLUTION INTRAVENOUS at 15:43

## 2024-07-17 RX ADMIN — FOLIC ACID 125 ML/HR: 5 INJECTION, SOLUTION INTRAMUSCULAR; INTRAVENOUS; SUBCUTANEOUS at 20:17

## 2024-07-17 RX ADMIN — PROCHLORPERAZINE EDISYLATE 10 MG: 5 INJECTION INTRAMUSCULAR; INTRAVENOUS at 09:28

## 2024-07-17 RX ADMIN — THIAMINE HYDROCHLORIDE 100 MG: 100 INJECTION, SOLUTION INTRAMUSCULAR; INTRAVENOUS at 15:40

## 2024-07-17 NOTE — ED NOTES
Annie Kent    Nursing Report ED to Floor:  Mental status: AOx4  Ambulatory status: ad angelina  Oxygen Therapy:  RA  Cardiac Rhythm: Sinus Tach  Admitted from: home  Safety Concerns:  none  Social Issues: none  ED Room #:  03    ED Nurse Phone Extension - [phone] or may call [phone].      HPI:   Chief Complaint   Patient presents with    Abdominal Pain    Nausea    Dizziness       Past Medical History:  Past Medical History:   Diagnosis Date    Anxiety 2017    Back pain     chiropractor; PRN nsaids    Carpal tunnel syndrome, left     Chronic nausea     chronic since LSG    GERD (gastroesophageal reflux disease)     Omeprazole daily. EGD prior to LSG;    H. pylori infection     EGD Dr. Arce 3/24 - abundant h. pylori, PrevPak prescribed, re-test pending    H/O gastric sleeve     Dr. Bird 3/2021    Hiatal hernia     History of abnormal cervical Pap smear     History of MRSA infection     skin boil at buttock as child    History of postpartum hemorrhage     History of pre-eclampsia 2018    History of twin pregnancy in prior pregnancy     Hypertension     Joint pain     Left lateral epicondylitis     cortisone injections and PT    Palpitations     with high stress    Polycystic ovarian disease     PONV (postoperative nausea and vomiting)     Postpartum spinal headache     Prediabetes     per external records; controlled post-sleeve    Tendonitis of elbow, left     Vitamin B deficiency     Wears eyeglasses         Past Surgical History:  Past Surgical History:   Procedure Laterality Date     SECTION      lower transverse     SECTION      COLPOSCOPY W/ BIOPSY / CURETTAGE      negative, per patient    D & C WITH SUCTION      EAB    ENDOSCOPY  2024    with biopsies    ENDOSCOPY N/A 2024    Procedure: ESOPHAGOGASTRODUODENOSCOPY;  Surgeon: Rhett Arce MD;  Location: [facility];  Service: General;  Laterality: N/A;  SCOPE     GASTRIC BYPASS N/A 2024    Procedure:  JUSTINO-EN-Y GASTRIC BYPASS LAPAROSCOPIC WITH DAVINCI ROBOT;  Surgeon: Rhett Arce MD;  Location:  [facility];  Service: Robotics - DaVinci;  Laterality: N/A;    GASTRIC SLEEVE LAPAROSCOPIC  03/18/2021    Dr. Bird    HIATAL HERNIA REPAIR N/A 7/12/2024    Procedure: LAPAROSCOPIC HIATAL HERNIA REPAIR WITH DAVINCI ROBOT;  Surgeon: Rhett Arce MD;  Location:  [facility];  Service: Robotics - DaVinci;  Laterality: N/A;  HIATAL HERNIA TIME: 0726-0756    LAPAROSCOPIC CHOLECYSTECTOMY  2010    gallstones    WISDOM TOOTH EXTRACTION  2015        Admitting Doctor:   Rhett Arce MD    Consulting Provider(s):  Consults       No orders found from 6/18/2024 to 7/18/2024.             Admitting Diagnosis:   The primary encounter diagnosis was Small bowel obstruction. Diagnoses of Pulmonary nodule, Acute abdominal pain, History of Justino-en-Y gastric bypass, Referred by health care professional, and Dehydration were also pertinent to this visit.    Most Recent Vitals:   Vitals:    07/17/24 1405 07/17/24 1406 07/17/24 1430 07/17/24 1500   BP: 131/84  119/78 123/79   Pulse: 111 112 115 (!) 123   Resp:       Temp:       TempSrc:       SpO2: 98% 98% 99% 97%   Weight:       Height:           Active LDAs/IV Access:   Lines, Drains & Airways       Active LDAs       Name Placement date Placement time Site Days    Peripheral IV 07/12/24 1543 Right Forearm 07/12/24  1543  Forearm  4    Peripheral IV 07/17/24 0925 Right Antecubital 07/17/24  0925  Antecubital  less than 1                    Labs (abnormal labs have a star):   Labs Reviewed   COMPREHENSIVE METABOLIC PANEL - Abnormal; Notable for the following components:       Result Value    CO2 18.0 (*)     Anion Gap 19.0 (*)     All other components within normal limits    Narrative:     GFR Normal >60  Chronic Kidney Disease <60  Kidney Failure <15     LIPASE - Abnormal; Notable for the following components:    Lipase 64 (*)     All other components within normal limits  "  URINALYSIS W/ MICROSCOPIC IF INDICATED (NO CULTURE) - Abnormal; Notable for the following components:    Appearance, UA Cloudy (*)     Ketones, UA >=160 mg/dL (4+) (*)     Protein, UA Trace (*)     All other components within normal limits   CBC WITH AUTO DIFFERENTIAL - Abnormal; Notable for the following components:    WBC 11.31 (*)     Hemoglobin 11.1 (*)     Platelets 459 (*)     Lymphocyte % 10.7 (*)     Immature Grans % 1.0 (*)     Neutrophils, Absolute 8.52 (*)     Monocytes, Absolute 1.30 (*)     Immature Grans, Absolute 0.11 (*)     All other components within normal limits   D-DIMER, QUANTITATIVE - Abnormal; Notable for the following components:    D-Dimer, Quantitative 4.21 (*)     All other components within normal limits    Narrative:     According to the assay 's published package insert, a normal (<0.50 MCGFEU/mL) D-dimer result in conjunction with a non-high clinical probability assessment, excludes deep vein thrombosis (DVT) and pulmonary embolism (PE) with high sensitivity.    D-dimer values increase with age and this can make VTE exclusion of an older population difficult. To address this, the [employer], based on best available evidence and recent guidelines, recommends that clinicians use age-adjusted D-dimer thresholds in patients greater than 50 years of age with: a) a low probability of PE who do not meet all Pulmonary Embolism Rule Out Criteria, or b) in those with intermediate probability of PE.   The formula for an age-adjusted D-dimer cut-off is \"age/100\".  For example, a 60 year old patient would have an age-adjusted cut-off of 0.60 MCGFEU/mL and an 80 year old 0.80 MCGFEU/mL.   URINALYSIS, MICROSCOPIC ONLY - Abnormal; Notable for the following components:    RBC, UA 3-5 (*)     All other components within normal limits   BNP (IN-HOUSE) - Normal    Narrative:     This assay is used as an aid in the diagnosis of individuals suspected of having heart " failure. It can be used as an aid in the diagnosis of acute decompensated heart failure (ADHF) in patients presenting with signs and symptoms of ADHF to the emergency department (ED). In addition, NT-proBNP of <300 pg/mL indicates ADHF is not likely.    Age Range Result Interpretation  NT-proBNP Concentration (pg/mL:      <50             Positive            >450                   Gray                 300-450                    Negative             <300    50-75           Positive            >900                  Gray                300-900                  Negative            <300      >75             Positive            >1800                  Gray                300-1800                  Negative            <300   SINGLE HS TROPONIN T - Normal    Narrative:     High Sensitive Troponin T Reference Range:  <14.0 ng/L- Negative Female for AMI  <22.0 ng/L- Negative Male for AMI  >=14 - Abnormal Female indicating possible myocardial injury.  >=22 - Abnormal Male indicating possible myocardial injury.   Clinicians would have to utilize clinical acumen, EKG, Troponin, and serial changes to determine if it is an Acute Myocardial Infarction or myocardial injury due to an underlying chronic condition.        LACTIC ACID, PLASMA - Normal   CBC AND DIFFERENTIAL    Narrative:     The following orders were created for panel order CBC & Differential.  Procedure                               Abnormality         Status                     ---------                               -----------         ------                     CBC Auto Differential[[id number]]        Abnormal            Final result                 Please view results for these tests on the individual orders.       Meds Given in ED:   Medications   sodium chloride 0.9 % flush 10 mL (has no administration in time range)   sodium chloride 0.9 % infusion (has no administration in time range)   pantoprazole (PROTONIX) injection 40 mg (has no administration in time range)    thiamine (B-1) injection 100 mg (has no administration in time range)   oxyCODONE (ROXICODONE) immediate release tablet 5 mg (has no administration in time range)   HYDROmorphone (DILAUDID) tablet 2 mg (has no administration in time range)   HYDROmorphone (DILAUDID) injection 0.5 mg (has no administration in time range)     And   naloxone (NARCAN) injection 0.1 mg (has no administration in time range)   ondansetron (ZOFRAN) injection 4 mg (has no administration in time range)     Followed by   ondansetron ODT (ZOFRAN-ODT) disintegrating tablet 4 mg (has no administration in time range)   promethazine (PHENERGAN) tablet 12.5 mg (has no administration in time range)   prochlorperazine (COMPAZINE) tablet 10 mg (has no administration in time range)   prochlorperazine (COMPAZINE) injection 10 mg (has no administration in time range)   sodium chloride 0.9 % bolus 1,000 mL (0 mL Intravenous Stopped 7/17/24 1030)   ondansetron (ZOFRAN) injection 4 mg (4 mg Intravenous Not Given 7/17/24 0932)   prochlorperazine (COMPAZINE) injection 10 mg (10 mg Intravenous Given 7/17/24 0928)   sodium chloride 0.9 % bolus 1,000 mL (0 mL Intravenous Stopped 7/17/24 1120)   diatrizoate meglumine-sodium (GASTROGRAFIN) 66-10 % oral solution 15 mL (15 mL Oral Given 7/17/24 1204)   iopamidol (ISOVUE-370) 76 % injection 100 mL (85 mL Intravenous Given 7/17/24 1310)     sodium chloride, 150 mL/hr         Last NIH score:                                                          Dysphagia screening results:        Karla Coma Scale:  No data recorded     CIWA:        Restraint Type:            Isolation Status:  No active isolations

## 2024-07-17 NOTE — PLAN OF CARE
Goal Outcome Evaluation:  Plan of Care Reviewed With: patient  - Tachycardic, BP stable, RA  - A&OX4  - admitted from ED, fluids infusing, NPO except ice chips  - PRN flexeril administered for muscular pain  - Lap Incision sites CDI, CATINA drain incision healing and closed, minimal serous drainage  - Plan of care discussed with the patient at bedside              Progress: no change

## 2024-07-17 NOTE — ED PROVIDER NOTES
Subjective   History of Present Illness  Pleasant patient who presents the ER for abdominal discomfort and nausea.  She tells me she has significant pain with swallowing or eating.  She reports a kinked gastric sleeve that was corrected using a Justino-en-Y fairly recently during admission here.  She tells me her surgeon is Dr. Arce.  She spoke with one of his associates Spangler and was directed to the ER for further evaluation.  She reports tachycardia and episode of diarrhea earlier today.  Overall she reports feeling very poorly.        Review of Systems    Past Medical History:   Diagnosis Date    Anxiety 2017    Back pain     chiropractor; PRN nsaids    Carpal tunnel syndrome, left     Chronic nausea     chronic since LSG    GERD (gastroesophageal reflux disease)     Omeprazole daily. EGD prior to LSG;    H. pylori infection     EGD Dr. Arce 3/24 - abundant h. pylori, PrevPak prescribed, re-test pending    H/O gastric sleeve     Dr. Bird 3/2021    Hiatal hernia     History of abnormal cervical Pap smear     History of MRSA infection     skin boil at buttock as child    History of postpartum hemorrhage     History of pre-eclampsia 2018    History of twin pregnancy in prior pregnancy     Hypertension     Joint pain     Left lateral epicondylitis     cortisone injections and PT    Palpitations     with high stress    Polycystic ovarian disease     PONV (postoperative nausea and vomiting)     Postpartum spinal headache     Prediabetes     per external records; controlled post-sleeve    Tendonitis of elbow, left     Vitamin B deficiency     Wears eyeglasses        Allergies   Allergen Reactions    Lisinopril Cough       Past Surgical History:   Procedure Laterality Date     SECTION      lower transverse     SECTION  2018    COLPOSCOPY W/ BIOPSY / CURETTAGE      negative, per patient    D & C WITH SUCTION      EAB    ENDOSCOPY  2024    with biopsies    ENDOSCOPY N/A 2024     Procedure: ESOPHAGOGASTRODUODENOSCOPY;  Surgeon: Rhett Arce MD;  Location:  [facility];  Service: General;  Laterality: N/A;  SCOPE     GASTRIC BYPASS N/A 7/12/2024    Procedure: OLIVER-EN-Y GASTRIC BYPASS LAPAROSCOPIC WITH DAVINCI ROBOT;  Surgeon: Rhett Arce MD;  Location:  [facility];  Service: Robotics - DaVinci;  Laterality: N/A;    GASTRIC SLEEVE LAPAROSCOPIC  03/18/2021    Dr. Bird    HIATAL HERNIA REPAIR N/A 7/12/2024    Procedure: LAPAROSCOPIC HIATAL HERNIA REPAIR WITH DAVINCI ROBOT;  Surgeon: Rhett Arce MD;  Location:  [facility];  Service: Robotics - DaVinci;  Laterality: N/A;  HIATAL HERNIA TIME: 0726-0756    LAPAROSCOPIC CHOLECYSTECTOMY  2010    gallstones    WISDOM TOOTH EXTRACTION  2015       Family History   Problem Relation Age of Onset    Sleep apnea Father     Hypertension Mother     No Known Problems Brother     No Known Problems Sister     Hypertension Paternal Grandfather     Stroke Paternal Grandfather     Hypertension Paternal Grandmother     Heart disease Paternal Grandmother     Heart attack Paternal Grandmother     Hypertension Maternal Grandmother     Hypertension Maternal Grandfather     Diabetes Maternal Grandfather     Pancreatic cancer Maternal Great-Grandfather     Breast cancer Neg Hx     Ovarian cancer Neg Hx     Colon cancer Neg Hx     Colon polyps Neg Hx        Social History     Socioeconomic History    Marital status:    Tobacco Use    Smoking status: Never    Smokeless tobacco: Never   Vaping Use    Vaping status: Some Days   Substance and Sexual Activity    Alcohol use: Yes     Comment: once a month, social    Drug use: No    Sexual activity: Defer     Partners: Male     Birth control/protection: Vasectomy           Objective   Physical Exam  Constitutional:       Appearance: She is well-developed. She is obese. She is ill-appearing.   HENT:      Head: Normocephalic and atraumatic.      Right Ear: External ear normal.      Left Ear: External  ear normal.      Nose: Nose normal.   Eyes:      Conjunctiva/sclera: Conjunctivae normal.      Pupils: Pupils are equal, round, and reactive to light.   Cardiovascular:      Rate and Rhythm: Regular rhythm. Tachycardia present.      Heart sounds: Normal heart sounds.   Pulmonary:      Effort: Pulmonary effort is normal.      Breath sounds: Normal breath sounds.   Abdominal:      General: Bowel sounds are normal.      Palpations: Abdomen is soft.      Tenderness: There is abdominal tenderness.   Musculoskeletal:         General: Normal range of motion.      Cervical back: Normal range of motion and neck supple.   Skin:     General: Skin is warm.   Neurological:      Mental Status: She is alert and oriented to person, place, and time.   Psychiatric:         Behavior: Behavior normal.         Thought Content: Thought content normal.         Judgment: Judgment normal.         Procedures           ED Course  ED Course as of 07/17/24 1525   Wed Jul 17, 2024   1218 Elevated D-dimer.  Will need to investigate that with a CTA as well as a CT of her abdomen pelvis given her recent Justino-en-Y. [JM]   1343 Patient resting comfortably.  Awaiting CAT scans [JM]   1405 I have paged Kelin Spangler to discuss the results of the CAT scan [JM]   4128 Spoke to Kelin Spangler and advised her of the CAT scan findings.  She plans on speaking to Dr. Arce.  And she will call me back. [JM]   8304 I spoke with Kelin Spangler.  She spoke with Dr. Arce who will admit the patient.  She recommends getting normal saline at 150 an hour.  I spoke to bed control and they will go to 2F or 2G. [JM]      ED Course User Index  [JM] Miguel Mendez APRN                                        CT Angiogram Chest   Final Result   1. Moderate motion-related image blurring. Allowing for this, no evidence of pulmonary embolic disease or other clearly acute chest disease.      2. Moderate bilateral discoid atelectasis, and smooth round pleural-based 8 mm right lower lobe  pulmonary nodule. Fleischner Society guidelines for pulmonary nodule management do not apply to patients younger than 35, and guidelines suggest follow-up    imaging on a case-by-case basis, based on patient's clinical findings.            CT SCAN OF THE ABDOMEN PELVIS WITH ORAL AND IV CONTRAST: CT scan  film shows a dilated loop of small bowel in the right upper quadrant. The axial scan shows fatty liver change, and previous cholecystectomy. Pancreas, spleen, adrenal glands, and    kidneys appear within normal limits. No biliary ductal dilatation is seen.      Left upper quadrant anatomy is complex. There appears to be a small gastric remnant and associated bypass loop. Oral contrast seen within small bowel and colon is relatively dense and may be residual from previous upper GI series. The bypassed portion of    the stomach appears markedly decompressed/airless as does the duodenum. There are multiple adjacent elongated and nodular areas adjacent to the stomach and apparent remnant that cannot be connected with bowel and may represent aging hematomas. Average    SUVs in the mid 50 range are similar to hematoma but nonspecific. This includes an elongated 5.7 x 2.2 cm area on image 30, medial of the bypassed portion of the stomach, and a 3.7 x 2.3 cm area dorsal to the stomach, as well as other smaller collections    that appear to be interconnected. A separate lower density collection, image 40 near the splenic hilum is difficult to directly connect with bowel and may represent a separate loculation.      Allowing for mildly irregular shape of the gastric remnant, no extraluminal air or free air is seen. There is relatively mild upper abdominal fat stranding, and no clearly free fluid.      There are dilated loops of proximal bowel that appear to proceed from the gastric bypass, image 42 through 89. Maximal diameter is between 3.5 and 4 cm. There is mild associated small bowel mucosal edema. There is a  suggestion of a transition point    possibly associated with a small bowel anastomotic site images 65 through 70. Beyond this level, small bowel is normal to decompressed in diameter throughout the mid and lower abdomen. Colon contains dense contrast, and is decompressed. There is normal    contrast opacification of the portal splenic and superior mesenteric veins.      Regarding the lower abdomen/pelvis, there is mild intrapelvic free fluid, normal sized uterus and prominent ovaries and moderately distended bladder. No intrapelvic mass or adenopathy is seen. Terminal ileum, cecum and appendix appear normal.      Delayed venous phase images show no evidence of obstructive uropathy. Bony structures appear to be intact.      Impression:      1. Findings consistent with recent postoperative changes of gastric bypass. Associated focal dense collections suspected to represent aging postoperative hematoma as discussed above. No evidence of free air.      2.. Relatively mild proximal small bowel obstruction. Dilated proximal small bowel loops are associated with mild mesenteric edema.      3. Apparently delayed passage of contrast from previous upper GI series, suggesting superimposed ileus as well.            Electronically Signed: Sadi Montelongo MD     7/17/2024 1:47 PM EDT     Workstation ID: [id number]      CT Abdomen Pelvis With Contrast   Final Result   1. Moderate motion-related image blurring. Allowing for this, no evidence of pulmonary embolic disease or other clearly acute chest disease.      2. Moderate bilateral discoid atelectasis, and smooth round pleural-based 8 mm right lower lobe pulmonary nodule. Fleischner Society guidelines for pulmonary nodule management do not apply to patients younger than 35, and guidelines suggest follow-up    imaging on a case-by-case basis, based on patient's clinical findings.            CT SCAN OF THE ABDOMEN PELVIS WITH ORAL AND IV CONTRAST: CT scan  film shows a dilated loop  of small bowel in the right upper quadrant. The axial scan shows fatty liver change, and previous cholecystectomy. Pancreas, spleen, adrenal glands, and    kidneys appear within normal limits. No biliary ductal dilatation is seen.      Left upper quadrant anatomy is complex. There appears to be a small gastric remnant and associated bypass loop. Oral contrast seen within small bowel and colon is relatively dense and may be residual from previous upper GI series. The bypassed portion of    the stomach appears markedly decompressed/airless as does the duodenum. There are multiple adjacent elongated and nodular areas adjacent to the stomach and apparent remnant that cannot be connected with bowel and may represent aging hematomas. Average    SUVs in the mid 50 range are similar to hematoma but nonspecific. This includes an elongated 5.7 x 2.2 cm area on image 30, medial of the bypassed portion of the stomach, and a 3.7 x 2.3 cm area dorsal to the stomach, as well as other smaller collections    that appear to be interconnected. A separate lower density collection, image 40 near the splenic hilum is difficult to directly connect with bowel and may represent a separate loculation.      Allowing for mildly irregular shape of the gastric remnant, no extraluminal air or free air is seen. There is relatively mild upper abdominal fat stranding, and no clearly free fluid.      There are dilated loops of proximal bowel that appear to proceed from the gastric bypass, image 42 through 89. Maximal diameter is between 3.5 and 4 cm. There is mild associated small bowel mucosal edema. There is a suggestion of a transition point    possibly associated with a small bowel anastomotic site images 65 through 70. Beyond this level, small bowel is normal to decompressed in diameter throughout the mid and lower abdomen. Colon contains dense contrast, and is decompressed. There is normal    contrast opacification of the portal splenic and  superior mesenteric veins.      Regarding the lower abdomen/pelvis, there is mild intrapelvic free fluid, normal sized uterus and prominent ovaries and moderately distended bladder. No intrapelvic mass or adenopathy is seen. Terminal ileum, cecum and appendix appear normal.      Delayed venous phase images show no evidence of obstructive uropathy. Bony structures appear to be intact.      Impression:      1. Findings consistent with recent postoperative changes of gastric bypass. Associated focal dense collections suspected to represent aging postoperative hematoma as discussed above. No evidence of free air.      2.. Relatively mild proximal small bowel obstruction. Dilated proximal small bowel loops are associated with mild mesenteric edema.      3. Apparently delayed passage of contrast from previous upper GI series, suggesting superimposed ileus as well.            Electronically Signed: Sadi Montelongo MD     7/17/2024 1:47 PM EDT     Workstation ID: [id number]               Medical Decision Making  Problems Addressed:  Acute abdominal pain: complicated acute illness or injury  Dehydration: complicated acute illness or injury  History of Justino-en-Y gastric bypass: complicated acute illness or injury  Pulmonary nodule: complicated acute illness or injury  Referred by health care professional: complicated acute illness or injury  Small bowel obstruction: complicated acute illness or injury    Amount and/or Complexity of Data Reviewed  External Data Reviewed: labs, radiology and ECG.  Labs: ordered. Decision-making details documented in ED Course.  Radiology: ordered. Decision-making details documented in ED Course.  ECG/medicine tests: ordered. Decision-making details documented in ED Course.    Risk  Prescription drug management.  Decision regarding hospitalization.        Final diagnoses:   Small bowel obstruction   Pulmonary nodule   Acute abdominal pain   History of Justino-en-Y gastric bypass   Referred by health care  professional   Dehydration       ED Disposition  ED Disposition       ED Disposition   Decision to Admit    Condition   --    Comment   Level of Care: Med/Surg [1]   Admitting Physician: LISA ALMONTE [[id number]]   Bed Request Comments: 2f 2g                 No follow-up provider specified.       Medication List      No changes were made to your prescriptions during this visit.            Miguel Mendez, MAGO  07/17/24 [id number]       Miguel Mendez APRN  07/17/24 [id number]

## 2024-07-17 NOTE — H&P
BARIATRIC SURGERY H&P      Patient Care Team:  Flory Suh APRN as PCP - General (Nurse Practitioner)  Miguel Arriaga MD as Consulting Physician (Obstetrics and Gynecology)      Chief complaint: dehydration    Subjective     History of Present Illness    Annie Kent is a 28 y.o. year old female POD#5 conversion of sleeve to RYGB and HHR with falciform ligament reinforcement.  She presented to ED with poor oral intake, secondary to nausea and substernal dysphagia/pain, and weakness/dizziness when walking.  She also c/o of incompletely controlled abdoiminal pain.    The night of her surgery, she had hypotension with large bloody CATINA output, consistent with post-operative hemorrhage, which was self-limited and required no blood transfusion or reoperation.  Serial hemoglobins were checked, and stabilized on POD#2.  Post op UGI suggested ileus vs. Early SBO, but she was drinking well and wanted to be discharged.      She rceived 2L of IVF in ED and felt much better.  She has had a liquid BM today and has passed flatus.  At home, she was only able to tolerate a little water and hot cocoa, but notes she tried to drink on a schedule.      Review of Systems     Past Medical History:   Diagnosis Date    Anxiety 2017    Back pain     chiropractor; PRN nsaids    Carpal tunnel syndrome, left     Chronic nausea     chronic since LSG    GERD (gastroesophageal reflux disease)     Omeprazole daily. EGD prior to LSG;    H. pylori infection     EGD Dr. Arce 3/24 - abundant h. pylori, PrevPak prescribed, re-test pending    H/O gastric sleeve     Dr. Bird 3/2021    Hiatal hernia     History of abnormal cervical Pap smear     History of MRSA infection     skin boil at buttock as child    History of postpartum hemorrhage     History of pre-eclampsia 2018    History of twin pregnancy in prior pregnancy     Hypertension     Joint pain     Left lateral epicondylitis     cortisone injections and PT    Palpitations     with high  stress    Polycystic ovarian disease     PONV (postoperative nausea and vomiting)     Postpartum spinal headache     Prediabetes     per external records; controlled post-sleeve    Tendonitis of elbow, left     Vitamin B deficiency     Wears eyeglasses      Past Surgical History:   Procedure Laterality Date     SECTION      lower transverse     SECTION      COLPOSCOPY W/ BIOPSY / CURETTAGE      negative, per patient    D & C WITH SUCTION      EAB    ENDOSCOPY  2024    with biopsies    ENDOSCOPY N/A 2024    Procedure: ESOPHAGOGASTRODUODENOSCOPY;  Surgeon: Rhett Arce MD;  Location:  [facility];  Service: General;  Laterality: N/A;  SCOPE     GASTRIC BYPASS N/A 2024    Procedure: OLIVER-EN-Y GASTRIC BYPASS LAPAROSCOPIC WITH DAVINCI ROBOT;  Surgeon: Rhett Arce MD;  Location:  [facility];  Service: Robotics - DaVinci;  Laterality: N/A;    GASTRIC SLEEVE LAPAROSCOPIC  2021    Dr. Bird    HIATAL HERNIA REPAIR N/A 2024    Procedure: LAPAROSCOPIC HIATAL HERNIA REPAIR WITH DAVINCI ROBOT;  Surgeon: Rhett Arce MD;  Location:  [facility];  Service: Robotics - Endymedinci;  Laterality: N/A;  HIATAL HERNIA TIME:     LAPAROSCOPIC CHOLECYSTECTOMY      gallstones    WISDOM TOOTH EXTRACTION       Family History   Problem Relation Age of Onset    Sleep apnea Father     Hypertension Mother     No Known Problems Brother     No Known Problems Sister     Hypertension Paternal Grandfather     Stroke Paternal Grandfather     Hypertension Paternal Grandmother     Heart disease Paternal Grandmother     Heart attack Paternal Grandmother     Hypertension Maternal Grandmother     Hypertension Maternal Grandfather     Diabetes Maternal Grandfather     Pancreatic cancer Maternal Great-Grandfather     Breast cancer Neg Hx     Ovarian cancer Neg Hx     Colon cancer Neg Hx     Colon polyps Neg Hx      Social History     Tobacco Use    Smoking status:  Never    Smokeless tobacco: Never   Vaping Use    Vaping status: Some Days   Substance Use Topics    Alcohol use: Yes     Comment: once a month, social    Drug use: No     E-cigarette/Vaping    E-cigarette/Vaping Use Current Some Day User     Comments only occasional ( when etoh )      E-cigarette/Vaping Substances    Nicotine No     THC No     CBD No     Flavoring No      Medications Prior to Admission   Medication Sig Dispense Refill Last Dose    acetaminophen (TYLENOL) 500 MG tablet Take 1 tablet by mouth Every 6 (Six) Hours As Needed for Mild Pain.   Past Week    cetirizine (zyrTEC) 10 MG tablet Take 1 tablet by mouth Daily.   7/16/2024    cyanocobalamin 1000 MCG/ML injection INJECT 1ML INTRAMUSCULARLY ONCE WEEKLY FOR FOUR WEEKS, THEN INJECT 1ML INTRAMUSCULARLY ONCE MONTHLY THEREAFTER   Past Week    cyclobenzaprine (FLEXERIL) 10 MG tablet Take 1 tablet by mouth Every 8 (Eight) Hours. 45 tablet 0 7/16/2024    ferrous sulfate 325 (65 FE) MG tablet Take 1 tablet by mouth Daily With Breakfast.   7/16/2024    losartan (COZAAR) 25 MG tablet Take 1 tablet by mouth Daily.   7/16/2024    multivitamin with minerals tablet tablet Take 1 tablet by mouth Daily.   7/16/2024    omeprazole (priLOSEC) 40 MG capsule Take 1 capsule by mouth Daily. 30 capsule 0 7/16/2024    ondansetron ODT (ZOFRAN-ODT) 4 MG disintegrating tablet Place 1 tablet on the tongue Every 8 (Eight) Hours As Needed for Nausea or Vomiting.   Past Week    valACYclovir (VALTREX) 500 MG tablet Take 1 tablet by mouth As Needed (zoster). prn   7/16/2024    vitamin D3 125 MCG (5000 UT) capsule capsule Take 1 capsule by mouth Daily.   7/16/2024     Allergies:  Lisinopril    Objective      Vital Signs  Temp:  [98.4 °F (36.9 °C)] 98.4 °F (36.9 °C)  Heart Rate:  [105-143] 115  Resp:  [18] 18  BP: (108-142)/(65-93) 142/84    Physical Exam  Constitutional:       General: She is not in acute distress.     Appearance: She is well-developed. She is not diaphoretic.   HENT:       Head: Normocephalic and atraumatic.      Mouth/Throat:      Pharynx: No oropharyngeal exudate.   Eyes:      Conjunctiva/sclera: Conjunctivae normal.      Pupils: Pupils are equal, round, and reactive to light.   Cardiovascular:      Rate and Rhythm: Normal rate and regular rhythm.   Pulmonary:      Effort: Pulmonary effort is normal. No respiratory distress.   Abdominal:      General: There is no distension.      Palpations: Abdomen is soft.      Comments: Absent bowel sounds.  Incisions well-healing   Skin:     General: Skin is warm and dry.      Coloration: Skin is not pale.   Neurological:      Mental Status: She is alert and oriented to person, place, and time.      Cranial Nerves: No cranial nerve deficit.   Psychiatric:         Behavior: Behavior normal.         Thought Content: Thought content normal.         Results Review:   I reviewed the patient's new clinical results.  I reviewed the patient's new imaging results and agree with the interpretation.    Lab Results (last 24 hours)       Procedure Component Value Units Date/Time    Urinalysis With Microscopic If Indicated (No Culture) - Urine, Clean Catch [[id number]]  (Abnormal) Collected: 07/17/24 1205    Specimen: Urine, Clean Catch Updated: 07/17/24 1220     Color, UA Yellow     Appearance, UA Cloudy     pH, UA 5.5     Specific Gravity, UA 1.024     Glucose, UA Negative     Ketones, UA >=160 mg/dL (4+)     Bilirubin, UA Negative     Blood, UA Negative     Protein, UA Trace     Leuk Esterase, UA Negative     Nitrite, UA Negative     Urobilinogen, UA 1.0 E.U./dL    Urinalysis, Microscopic Only - Urine, Clean Catch [[id number]]  (Abnormal) Collected: 07/17/24 1205    Specimen: Urine, Clean Catch Updated: 07/17/24 1220     RBC, UA 3-5 /HPF      WBC, UA 0-2 /HPF      Bacteria, UA None Seen /HPF      Squamous Epithelial Cells, UA 0-2 /HPF      Hyaline Casts, UA 0-6 /LPF      Methodology Automated Microscopy    D-dimer, Quantitative [[id number]]  (Abnormal)  "Collected: 07/17/24 0926    Specimen: Blood Updated: 07/17/24 1017     D-Dimer, Quantitative 4.21 MCGFEU/mL     Narrative:      According to the assay 's published package insert, a normal (<0.50 MCGFEU/mL) D-dimer result in conjunction with a non-high clinical probability assessment, excludes deep vein thrombosis (DVT) and pulmonary embolism (PE) with high sensitivity.    D-dimer values increase with age and this can make VTE exclusion of an older population difficult. To address this, the [employer], based on best available evidence and recent guidelines, recommends that clinicians use age-adjusted D-dimer thresholds in patients greater than 50 years of age with: a) a low probability of PE who do not meet all Pulmonary Embolism Rule Out Criteria, or b) in those with intermediate probability of PE.   The formula for an age-adjusted D-dimer cut-off is \"age/100\".  For example, a 60 year old patient would have an age-adjusted cut-off of 0.60 MCGFEU/mL and an 80 year old 0.80 MCGFEU/mL.    BNP [[id number]]  (Normal) Collected: 07/17/24 0926    Specimen: Blood Updated: 07/17/24 1007     proBNP <36.0 pg/mL     Narrative:      This assay is used as an aid in the diagnosis of individuals suspected of having heart failure. It can be used as an aid in the diagnosis of acute decompensated heart failure (ADHF) in patients presenting with signs and symptoms of ADHF to the emergency department (ED). In addition, NT-proBNP of <300 pg/mL indicates ADHF is not likely.    Age Range Result Interpretation  NT-proBNP Concentration (pg/mL:      <50             Positive            >450                   Gray                 300-450                    Negative             <300    50-75           Positive            >900                  Gray                300-900                  Negative            <300      >75             Positive            >1800                  Gray                300-1800             "      Negative            <300    Comprehensive Metabolic Panel [[id number]]  (Abnormal) Collected: 07/17/24 0926    Specimen: Blood Updated: 07/17/24 1007     Glucose 88 mg/dL      BUN 12 mg/dL      Creatinine 0.69 mg/dL      Sodium 136 mmol/L      Potassium 4.1 mmol/L      Chloride 99 mmol/L      CO2 18.0 mmol/L      Calcium 9.2 mg/dL      Total Protein 7.3 g/dL      Albumin 4.0 g/dL      ALT (SGPT) 30 U/L      AST (SGOT) 19 U/L      Alkaline Phosphatase 84 U/L      Total Bilirubin 1.0 mg/dL      Globulin 3.3 gm/dL      Comment: Calculated Result        A/G Ratio 1.2 g/dL      BUN/Creatinine Ratio 17.4     Anion Gap 19.0 mmol/L      eGFR 119.9 mL/min/1.73     Narrative:      GFR Normal >60  Chronic Kidney Disease <60  Kidney Failure <15      Lipase [[id number]]  (Abnormal) Collected: 07/17/24 0926    Specimen: Blood Updated: 07/17/24 1007     Lipase 64 U/L     Single High Sensitivity Troponin T [[id number]]  (Normal) Collected: 07/17/24 0926    Specimen: Blood Updated: 07/17/24 1007     HS Troponin T 7 ng/L     Narrative:      High Sensitive Troponin T Reference Range:  <14.0 ng/L- Negative Female for AMI  <22.0 ng/L- Negative Male for AMI  >=14 - Abnormal Female indicating possible myocardial injury.  >=22 - Abnormal Male indicating possible myocardial injury.   Clinicians would have to utilize clinical acumen, EKG, Troponin, and serial changes to determine if it is an Acute Myocardial Infarction or myocardial injury due to an underlying chronic condition.         Lactic Acid, Plasma [[id number]]  (Normal) Collected: 07/17/24 0926    Specimen: Blood Updated: 07/17/24 1000     Lactate 1.1 mmol/L      Comment: Falsely depressed results may occur on samples drawn from patients receiving N-Acetylcysteine (NAC) or Metamizole.       CBC & Differential [[id number]]  (Abnormal) Collected: 07/17/24 0926    Specimen: Blood Updated: 07/17/24 0947    Narrative:      The following orders were created for panel order CBC &  Differential.  Procedure                               Abnormality         Status                     ---------                               -----------         ------                     CBC Auto Differential[[id number]]        Abnormal            Final result                 Please view results for these tests on the individual orders.    CBC Auto Differential [[id number]]  (Abnormal) Collected: 07/17/24 0926    Specimen: Blood Updated: 07/17/24 0947     WBC 11.31 10*3/mm3      RBC 3.91 10*6/mm3      Hemoglobin 11.1 g/dL      Hematocrit 34.1 %      MCV 87.2 fL      MCH 28.4 pg      MCHC 32.6 g/dL      RDW 13.8 %      RDW-SD 41.9 fl      MPV 9.3 fL      Platelets 459 10*3/mm3      Neutrophil % 75.3 %      Lymphocyte % 10.7 %      Monocyte % 11.5 %      Eosinophil % 1.1 %      Basophil % 0.4 %      Immature Grans % 1.0 %      Neutrophils, Absolute 8.52 10*3/mm3      Lymphocytes, Absolute 1.21 10*3/mm3      Monocytes, Absolute 1.30 10*3/mm3      Eosinophils, Absolute 0.13 10*3/mm3      Basophils, Absolute 0.04 10*3/mm3      Immature Grans, Absolute 0.11 10*3/mm3      nRBC 0.0 /100 WBC             Imaging Results (Last 24 Hours)       Procedure Component Value Units Date/Time    CT Angiogram Chest [[id number]] Collected: 07/17/24 1320     Updated: 07/17/24 1350    Narrative:      CT ANGIOGRAM CHEST, CT ABDOMEN PELVIS W CONTRAST    Date of Exam: 7/17/2024 1:09 PM EDT    Indication: pe.    Comparison: No prior CT scans. Upper GI series 7/13/2024    Technique: CTA of the chest was performed after the uneventful intravenous administration of 85 mL Isovue 370. Post IV contrast portal venous phase and delayed venous phase images were subsequently obtained through the abdomen and pelvis. Reconstructed   coronal and sagittal images were also obtained. In addition, a 3-D volume rendered image was created for interpretation. Automated exposure control and iterative reconstruction methods were used.      Findings:  History today  indicates abdominal discomfort and nausea, pain with eating. Previous history of gastric sleeve, and subsequent Justino-en-Y surgery. Previous upper GI series showed no evidence of contrast extravasation, but delayed contrast transit and some   small bowel distention, suggesting ileus or partial obstruction    CTA CHEST: There is good contrast opacification of the pulmonary arteries. No filling defects are seen to suggest pulmonary embolic disease. Study is mildly motion degraded, and very small peripheral emboli might not be detected if present, but none are   identified. There is significant cardiac motion artifact with ghosting of the outlines of the mediastinal structures. Allowing for this, there is no evidence of thoracic aortic dissection, and again, no evidence of pulmonary embolic disease.     No mediastinal adenopathy pericardial or pleural effusion is seen. Thoracic esophagus appears grossly normal down to the level of the GE junction where appears slightly thickened. The larger order airways appear normally patent. Smaller order airways are   poorly seen due to respiratory motion.     There is mild to moderate bilateral lower lobe discoid atelectasis, and a small pleural-based round noncalcified right lower lobe pulmonary nodule, 8 mm in diameter, image 47 series 2. There is no pleural effusion. Bony structures appear to be intact.      Impression:      1. Moderate motion-related image blurring. Allowing for this, no evidence of pulmonary embolic disease or other clearly acute chest disease.    2. Moderate bilateral discoid atelectasis, and smooth round pleural-based 8 mm right lower lobe pulmonary nodule. Fleischner Society guidelines for pulmonary nodule management do not apply to patients younger than 35, and guidelines suggest follow-up   imaging on a case-by-case basis, based on patient's clinical findings.        CT SCAN OF THE ABDOMEN PELVIS WITH ORAL AND IV CONTRAST: CT scan  film shows a  dilated loop of small bowel in the right upper quadrant. The axial scan shows fatty liver change, and previous cholecystectomy. Pancreas, spleen, adrenal glands, and   kidneys appear within normal limits. No biliary ductal dilatation is seen.    Left upper quadrant anatomy is complex. There appears to be a small gastric remnant and associated bypass loop. Oral contrast seen within small bowel and colon is relatively dense and may be residual from previous upper GI series. The bypassed portion of   the stomach appears markedly decompressed/airless as does the duodenum. There are multiple adjacent elongated and nodular areas adjacent to the stomach and apparent remnant that cannot be connected with bowel and may represent aging hematomas. Average   SUVs in the mid 50 range are similar to hematoma but nonspecific. This includes an elongated 5.7 x 2.2 cm area on image 30, medial of the bypassed portion of the stomach, and a 3.7 x 2.3 cm area dorsal to the stomach, as well as other smaller collections   that appear to be interconnected. A separate lower density collection, image 40 near the splenic hilum is difficult to directly connect with bowel and may represent a separate loculation.    Allowing for mildly irregular shape of the gastric remnant, no extraluminal air or free air is seen. There is relatively mild upper abdominal fat stranding, and no clearly free fluid.    There are dilated loops of proximal bowel that appear to proceed from the gastric bypass, image 42 through 89. Maximal diameter is between 3.5 and 4 cm. There is mild associated small bowel mucosal edema. There is a suggestion of a transition point   possibly associated with a small bowel anastomotic site images 65 through 70. Beyond this level, small bowel is normal to decompressed in diameter throughout the mid and lower abdomen. Colon contains dense contrast, and is decompressed. There is normal   contrast opacification of the portal splenic and  superior mesenteric veins.    Regarding the lower abdomen/pelvis, there is mild intrapelvic free fluid, normal sized uterus and prominent ovaries and moderately distended bladder. No intrapelvic mass or adenopathy is seen. Terminal ileum, cecum and appendix appear normal.    Delayed venous phase images show no evidence of obstructive uropathy. Bony structures appear to be intact.    Impression:    1. Findings consistent with recent postoperative changes of gastric bypass. Associated focal dense collections suspected to represent aging postoperative hematoma as discussed above. No evidence of free air.    2.. Relatively mild proximal small bowel obstruction. Dilated proximal small bowel loops are associated with mild mesenteric edema.    3. Apparently delayed passage of contrast from previous upper GI series, suggesting superimposed ileus as well.        Electronically Signed: Sadi Montelongo MD    7/17/2024 1:47 PM EDT    Workstation ID: [id number]    CT Abdomen Pelvis With Contrast [[id number]] Collected: 07/17/24 1320     Updated: 07/17/24 1350    Narrative:      CT ANGIOGRAM CHEST, CT ABDOMEN PELVIS W CONTRAST    Date of Exam: 7/17/2024 1:09 PM EDT    Indication: pe.    Comparison: No prior CT scans. Upper GI series 7/13/2024    Technique: CTA of the chest was performed after the uneventful intravenous administration of 85 mL Isovue 370. Post IV contrast portal venous phase and delayed venous phase images were subsequently obtained through the abdomen and pelvis. Reconstructed   coronal and sagittal images were also obtained. In addition, a 3-D volume rendered image was created for interpretation. Automated exposure control and iterative reconstruction methods were used.      Findings:  History today indicates abdominal discomfort and nausea, pain with eating. Previous history of gastric sleeve, and subsequent Justino-en-Y surgery. Previous upper GI series showed no evidence of contrast extravasation, but delayed contrast  transit and some   small bowel distention, suggesting ileus or partial obstruction    CTA CHEST: There is good contrast opacification of the pulmonary arteries. No filling defects are seen to suggest pulmonary embolic disease. Study is mildly motion degraded, and very small peripheral emboli might not be detected if present, but none are   identified. There is significant cardiac motion artifact with ghosting of the outlines of the mediastinal structures. Allowing for this, there is no evidence of thoracic aortic dissection, and again, no evidence of pulmonary embolic disease.     No mediastinal adenopathy pericardial or pleural effusion is seen. Thoracic esophagus appears grossly normal down to the level of the GE junction where appears slightly thickened. The larger order airways appear normally patent. Smaller order airways are   poorly seen due to respiratory motion.     There is mild to moderate bilateral lower lobe discoid atelectasis, and a small pleural-based round noncalcified right lower lobe pulmonary nodule, 8 mm in diameter, image 47 series 2. There is no pleural effusion. Bony structures appear to be intact.      Impression:      1. Moderate motion-related image blurring. Allowing for this, no evidence of pulmonary embolic disease or other clearly acute chest disease.    2. Moderate bilateral discoid atelectasis, and smooth round pleural-based 8 mm right lower lobe pulmonary nodule. Fleischner Society guidelines for pulmonary nodule management do not apply to patients younger than 35, and guidelines suggest follow-up   imaging on a case-by-case basis, based on patient's clinical findings.        CT SCAN OF THE ABDOMEN PELVIS WITH ORAL AND IV CONTRAST: CT scan  film shows a dilated loop of small bowel in the right upper quadrant. The axial scan shows fatty liver change, and previous cholecystectomy. Pancreas, spleen, adrenal glands, and   kidneys appear within normal limits. No biliary ductal  dilatation is seen.    Left upper quadrant anatomy is complex. There appears to be a small gastric remnant and associated bypass loop. Oral contrast seen within small bowel and colon is relatively dense and may be residual from previous upper GI series. The bypassed portion of   the stomach appears markedly decompressed/airless as does the duodenum. There are multiple adjacent elongated and nodular areas adjacent to the stomach and apparent remnant that cannot be connected with bowel and may represent aging hematomas. Average   SUVs in the mid 50 range are similar to hematoma but nonspecific. This includes an elongated 5.7 x 2.2 cm area on image 30, medial of the bypassed portion of the stomach, and a 3.7 x 2.3 cm area dorsal to the stomach, as well as other smaller collections   that appear to be interconnected. A separate lower density collection, image 40 near the splenic hilum is difficult to directly connect with bowel and may represent a separate loculation.    Allowing for mildly irregular shape of the gastric remnant, no extraluminal air or free air is seen. There is relatively mild upper abdominal fat stranding, and no clearly free fluid.    There are dilated loops of proximal bowel that appear to proceed from the gastric bypass, image 42 through 89. Maximal diameter is between 3.5 and 4 cm. There is mild associated small bowel mucosal edema. There is a suggestion of a transition point   possibly associated with a small bowel anastomotic site images 65 through 70. Beyond this level, small bowel is normal to decompressed in diameter throughout the mid and lower abdomen. Colon contains dense contrast, and is decompressed. There is normal   contrast opacification of the portal splenic and superior mesenteric veins.    Regarding the lower abdomen/pelvis, there is mild intrapelvic free fluid, normal sized uterus and prominent ovaries and moderately distended bladder. No intrapelvic mass or adenopathy is seen.  Terminal ileum, cecum and appendix appear normal.    Delayed venous phase images show no evidence of obstructive uropathy. Bony structures appear to be intact.    Impression:    1. Findings consistent with recent postoperative changes of gastric bypass. Associated focal dense collections suspected to represent aging postoperative hematoma as discussed above. No evidence of free air.    2.. Relatively mild proximal small bowel obstruction. Dilated proximal small bowel loops are associated with mild mesenteric edema.    3. Apparently delayed passage of contrast from previous upper GI series, suggesting superimposed ileus as well.        Electronically Signed: Sadi Montelongo MD    7/17/2024 1:47 PM EDT    Workstation ID: [id number]              Assessment & Plan       * No active hospital problems. *      Assessment & Plan    27 yo F POD #5 s/p conversion of sleeve to RYGB with HHR and falciform ligament reinforcement  Readmitted with abdominal pain, dehydration.  CT scan shows possible proximal SBO vs. Ileus and aging hematoma.  No evidence for further bleeding, and labs are hemoconcentrated with metabolic acidosis and ketonuria.  Fortunately, still passing flatus and had loose BM today.  No vomiting.  No evidence of leak or infection.    Rally bag, check prealbumin and iron.  NPO except sips/chips/meds.  Possible SBFT in AM.   IV PPI.  Avoid NSAIDs/steroids/ASA.    I discussed the patient's findings and my recommendations with patient    Ellie Wolfe MD  07/17/24  16:46 EDT

## 2024-07-18 ENCOUNTER — APPOINTMENT (OUTPATIENT)
Dept: GENERAL RADIOLOGY | Facility: HOSPITAL | Age: 29
End: 2024-07-18
Payer: COMMERCIAL

## 2024-07-18 PROBLEM — E86.0 DEHYDRATION: Status: ACTIVE | Noted: 2024-07-18

## 2024-07-18 LAB
ALBUMIN SERPL-MCNC: 3.4 G/DL (ref 3.5–5.2)
ALBUMIN/GLOB SERPL: 1.5 G/DL
ALP SERPL-CCNC: 72 U/L (ref 39–117)
ALT SERPL W P-5'-P-CCNC: 20 U/L (ref 1–33)
ANION GAP SERPL CALCULATED.3IONS-SCNC: 15 MMOL/L (ref 5–15)
AST SERPL-CCNC: 14 U/L (ref 1–32)
BASOPHILS # BLD AUTO: 0.04 10*3/MM3 (ref 0–0.2)
BASOPHILS NFR BLD AUTO: 0.6 % (ref 0–1.5)
BILIRUB SERPL-MCNC: 0.7 MG/DL (ref 0–1.2)
BUN SERPL-MCNC: 7 MG/DL (ref 6–20)
BUN/CREAT SERPL: 14.3 (ref 7–25)
CA-I SERPL ISE-MCNC: 1.25 MMOL/L (ref 1.12–1.32)
CALCIUM SPEC-SCNC: 8 MG/DL (ref 8.6–10.5)
CHLORIDE SERPL-SCNC: 104 MMOL/L (ref 98–107)
CO2 SERPL-SCNC: 18 MMOL/L (ref 22–29)
CREAT SERPL-MCNC: 0.49 MG/DL (ref 0.57–1)
DEPRECATED RDW RBC AUTO: 44.9 FL (ref 37–54)
EGFRCR SERPLBLD CKD-EPI 2021: 130.2 ML/MIN/1.73
EOSINOPHIL # BLD AUTO: 0.22 10*3/MM3 (ref 0–0.4)
EOSINOPHIL NFR BLD AUTO: 3.5 % (ref 0.3–6.2)
ERYTHROCYTE [DISTWIDTH] IN BLOOD BY AUTOMATED COUNT: 13.9 % (ref 12.3–15.4)
GLOBULIN UR ELPH-MCNC: 2.2 GM/DL
GLUCOSE SERPL-MCNC: 81 MG/DL (ref 65–99)
HCT VFR BLD AUTO: 28.9 % (ref 34–46.6)
HGB BLD-MCNC: 9.3 G/DL (ref 12–15.9)
IMM GRANULOCYTES # BLD AUTO: 0.12 10*3/MM3 (ref 0–0.05)
IMM GRANULOCYTES NFR BLD AUTO: 1.9 % (ref 0–0.5)
IRON 24H UR-MRATE: 22 MCG/DL (ref 37–145)
LYMPHOCYTES # BLD AUTO: 1.11 10*3/MM3 (ref 0.7–3.1)
LYMPHOCYTES NFR BLD AUTO: 17.5 % (ref 19.6–45.3)
MAGNESIUM SERPL-MCNC: 1.7 MG/DL (ref 1.6–2.6)
MCH RBC QN AUTO: 28.4 PG (ref 26.6–33)
MCHC RBC AUTO-ENTMCNC: 32.2 G/DL (ref 31.5–35.7)
MCV RBC AUTO: 88.1 FL (ref 79–97)
MONOCYTES # BLD AUTO: 1.02 10*3/MM3 (ref 0.1–0.9)
MONOCYTES NFR BLD AUTO: 16.1 % (ref 5–12)
NEUTROPHILS NFR BLD AUTO: 3.82 10*3/MM3 (ref 1.7–7)
NEUTROPHILS NFR BLD AUTO: 60.4 % (ref 42.7–76)
NRBC BLD AUTO-RTO: 0 /100 WBC (ref 0–0.2)
PHOSPHATE SERPL-MCNC: 2.8 MG/DL (ref 2.5–4.5)
PLATELET # BLD AUTO: 389 10*3/MM3 (ref 140–450)
PMV BLD AUTO: 9.1 FL (ref 6–12)
POTASSIUM SERPL-SCNC: 4.2 MMOL/L (ref 3.5–5.2)
PREALB SERPL-MCNC: 9.3 MG/DL (ref 20–40)
PROT SERPL-MCNC: 5.6 G/DL (ref 6–8.5)
RBC # BLD AUTO: 3.28 10*6/MM3 (ref 3.77–5.28)
SODIUM SERPL-SCNC: 137 MMOL/L (ref 136–145)
WBC NRBC COR # BLD AUTO: 6.33 10*3/MM3 (ref 3.4–10.8)

## 2024-07-18 PROCEDURE — 63710000001 PROCHLORPERAZINE MALEATE PER 10 MG: Performed by: SURGERY

## 2024-07-18 PROCEDURE — 84100 ASSAY OF PHOSPHORUS: CPT | Performed by: SURGERY

## 2024-07-18 PROCEDURE — 96376 TX/PRO/DX INJ SAME DRUG ADON: CPT

## 2024-07-18 PROCEDURE — 83735 ASSAY OF MAGNESIUM: CPT | Performed by: SURGERY

## 2024-07-18 PROCEDURE — 25010000002 ENOXAPARIN PER 10 MG: Performed by: SURGERY

## 2024-07-18 PROCEDURE — 85025 COMPLETE CBC W/AUTO DIFF WBC: CPT | Performed by: SURGERY

## 2024-07-18 PROCEDURE — 25010000002 NA FERRIC GLUC CPLX PER 12.5 MG: Performed by: SURGERY

## 2024-07-18 PROCEDURE — 99024 POSTOP FOLLOW-UP VISIT: CPT | Performed by: SURGERY

## 2024-07-18 PROCEDURE — 25010000002 POTASSIUM CHLORIDE PER 2 MEQ: Performed by: SURGERY

## 2024-07-18 PROCEDURE — 96372 THER/PROPH/DIAG INJ SC/IM: CPT

## 2024-07-18 PROCEDURE — 83540 ASSAY OF IRON: CPT | Performed by: SURGERY

## 2024-07-18 PROCEDURE — 84134 ASSAY OF PREALBUMIN: CPT | Performed by: SURGERY

## 2024-07-18 PROCEDURE — 96361 HYDRATE IV INFUSION ADD-ON: CPT

## 2024-07-18 PROCEDURE — 25010000002 ONDANSETRON PER 1 MG: Performed by: SURGERY

## 2024-07-18 PROCEDURE — 82330 ASSAY OF CALCIUM: CPT | Performed by: SURGERY

## 2024-07-18 PROCEDURE — 25010000002 THIAMINE HCL 200 MG/2ML SOLUTION: Performed by: SURGERY

## 2024-07-18 PROCEDURE — 74019 RADEX ABDOMEN 2 VIEWS: CPT

## 2024-07-18 PROCEDURE — 80053 COMPREHEN METABOLIC PANEL: CPT | Performed by: SURGERY

## 2024-07-18 PROCEDURE — G0378 HOSPITAL OBSERVATION PER HR: HCPCS

## 2024-07-18 PROCEDURE — 25810000003 SODIUM CHLORIDE 0.9 % SOLUTION: Performed by: NURSE PRACTITIONER

## 2024-07-18 RX ORDER — SODIUM CHLORIDE AND POTASSIUM CHLORIDE 150; 450 MG/100ML; MG/100ML
125 INJECTION, SOLUTION INTRAVENOUS CONTINUOUS
Status: DISCONTINUED | OUTPATIENT
Start: 2024-07-18 | End: 2024-07-19 | Stop reason: HOSPADM

## 2024-07-18 RX ORDER — ENOXAPARIN SODIUM 100 MG/ML
40 INJECTION SUBCUTANEOUS DAILY
Status: DISCONTINUED | OUTPATIENT
Start: 2024-07-18 | End: 2024-07-19 | Stop reason: HOSPADM

## 2024-07-18 RX ADMIN — ONDANSETRON 4 MG: 2 INJECTION INTRAMUSCULAR; INTRAVENOUS at 04:26

## 2024-07-18 RX ADMIN — CYCLOBENZAPRINE HYDROCHLORIDE 10 MG: 10 TABLET, FILM COATED ORAL at 21:47

## 2024-07-18 RX ADMIN — THIAMINE HYDROCHLORIDE 100 MG: 100 INJECTION, SOLUTION INTRAMUSCULAR; INTRAVENOUS at 08:55

## 2024-07-18 RX ADMIN — OXYCODONE HYDROCHLORIDE 5 MG: 5 TABLET ORAL at 19:06

## 2024-07-18 RX ADMIN — PROCHLORPERAZINE MALEATE 10 MG: 10 TABLET ORAL at 19:06

## 2024-07-18 RX ADMIN — PANTOPRAZOLE SODIUM 40 MG: 40 INJECTION, POWDER, LYOPHILIZED, FOR SOLUTION INTRAVENOUS at 08:55

## 2024-07-18 RX ADMIN — POTASSIUM CHLORIDE AND SODIUM CHLORIDE 125 ML/HR: 450; 150 INJECTION, SOLUTION INTRAVENOUS at 16:18

## 2024-07-18 RX ADMIN — PANTOPRAZOLE SODIUM 40 MG: 40 INJECTION, POWDER, LYOPHILIZED, FOR SOLUTION INTRAVENOUS at 21:47

## 2024-07-18 RX ADMIN — SODIUM CHLORIDE 150 ML/HR: 9 INJECTION, SOLUTION INTRAVENOUS at 01:20

## 2024-07-18 RX ADMIN — ENOXAPARIN SODIUM 40 MG: 40 INJECTION SUBCUTANEOUS at 12:04

## 2024-07-18 RX ADMIN — OXYCODONE HYDROCHLORIDE 5 MG: 5 TABLET ORAL at 04:25

## 2024-07-18 RX ADMIN — SODIUM CHLORIDE 150 ML/HR: 9 INJECTION, SOLUTION INTRAVENOUS at 08:25

## 2024-07-18 RX ADMIN — CYCLOBENZAPRINE HYDROCHLORIDE 10 MG: 10 TABLET, FILM COATED ORAL at 13:02

## 2024-07-18 RX ADMIN — SODIUM CHLORIDE 125 MG: 9 INJECTION, SOLUTION INTRAVENOUS at 09:22

## 2024-07-18 NOTE — PROGRESS NOTES
"HD#1 Dehydration \"feel better\"  POD#6 RNY GBLit    She is sitting up in bed alone in the room.  She is smiling, conversant, and feels much better after having received IV fluids.  She complained of diarrhea yesterday \"mustard color\" with foul odor, did not notice any mucus or blood.  Some mild left upper quadrant left lateral costal pain.  She says she had issues with nausea vomiting after her sleeve and she attributes it to the Premier protein shakes tasting so bad to her.  She said she has to do warm [place] protein and her  is going to bring it to her today.  We discussed that her prealbumin was low at the time of her bypass around 16 and now it is even lower at 9 and she realizes she will need to increase her protein intake.  She is ambulating and voiding better now.  No pulmonary complaints.    No fever pulse 113 respiration 16 blood pressure 153/97 saturation room air 100%. UO NR.  Abdomen is soft and benign bowel sounds active.  Wounds healed.  No ecchymosis.    CMP normal except for creatinine 0.49 CO2 18 calcium 8.0 total protein 5.6 almond 3.4.  Fecal leukocytes pending.  Ionized calcium 1.25.  White count normal 6.3 with 60% segs 18% lymphs 16% monocytes 4% eosinophils 2% immature granulocytes no bands.  H&H 9.3 and 28.9.  Prealbumin 9.3.  Iron 22.  Magnesium and phosphorus normal.  Urinalysis specific gravity 1.024 pH 5.5 greater than 160 ketones trace protein microscopic unremarkable.  Plain films on my review show a nonspecific bowel gas pattern with a few mildly distended small bowel loops and air and stool throughout the colon.  Report by Barber Moura \"persistent upper abdominal small bowel dilatation compatible with small bowel obstruction\".  He notes oral contrast in the colon or rectum \"which is decompressed\".    Impression: Hospital day #2 postop day #6 Justino-en-Y gastric bypass readmitted for dehydration.  Surgery complicated by postoperative intraperitoneal bleeding that did not require " transfusion.  H&H is back to baseline after hydration without evidence of active bleeding.  Low albumin and prealbumin at the time of surgery and worsening with suboptimal oral intake since surgery.  No nausea or vomiting and having bowel function, x-rays on my review nonspecific bowel gas pattern.      Plan: Continue hydration.  Requests cottage cheese and apple sauce and this should be fine.  If she can tolerate oral protein hopefully avoid TPN or tube feeds.  Follow-up fecal leukocytes, not able to order a C. Dif.  Fortunately she says she has not had any diarrhea today.  Continue IV PPI and IV thiamine.  Possibly home tomorrow.  See orders

## 2024-07-18 NOTE — CASE MANAGEMENT/SOCIAL WORK
Discharge Planning Assessment  [facility]     Patient Name: Annie Kent  MRN: [id number]  Today's Date: 7/18/2024    Admit Date: 7/17/2024    Plan: home   Discharge Needs Assessment       Row Name 07/18/24 0823       Living Environment    People in Home child(saw), dependent;spouse    Current Living Arrangements home    Primary Care Provided by self       Transition Planning    Patient/Family Anticipates Transition to home with family       Discharge Needs Assessment    Readmission Within the Last 30 Days previous discharge plan unsuccessful    Equipment Currently Used at Home none    Concerns to be Addressed basic needs;discharge planning                   Discharge Plan       Row Name 07/18/24 0823       Plan    Plan home    Patient/Family in Agreement with Plan yes    Plan Comments I met with this patient bedside. She lives with her  and dependent children in [facility]. She is independent with activities of daily living and mobility. She anticipates returning home after this hospitalization, and her  can transport. Case management will follow.    Final Discharge Disposition Code 01 - home or self-care                  Continued Care and Services - Admitted Since 7/17/2024    No active coordination exists for this encounter.       Expected Discharge Date and Time       Expected Discharge Date Expected Discharge Time    Jul 22, 2024            Demographic Summary       Row Name 07/18/24 0822       General Information    General Information Comments I confirmed that Flory Suh is Ms Kent's PCP and she has Chrissie THURMAN and [facility]                   Functional Status       Row Name 07/18/24 0823       Functional Status, IADL    Medications independent    Meal Preparation independent    Housekeeping independent    Laundry independent    Shopping independent                   Psychosocial    No documentation.                  Abuse/Neglect    No documentation.                  Legal    No  documentation.                  Substance Abuse    No documentation.                  Patient Forms    No documentation.                     Marguerite Rosas RN

## 2024-07-18 NOTE — PLAN OF CARE
Goal Outcome Evaluation:  Plan of Care Reviewed With: patient   - Tachycardic, BP stable, RA  - A&OX4  -Yazmin said okay to advance to cottage cheese and apple sauce  - CATINA drain incision closed, minimal serous drainage  -Replaced Iron     Progress: improving

## 2024-07-18 NOTE — PLAN OF CARE
Goal Outcome Evaluation:  Plan of Care Reviewed With: patient   - VSS, tachycardic, RA, A&Ox4  - Multiple episodes of diarrhea throughout shift.   - PRNs given for nausea and lower abdominal pain.      Progress: no change

## 2024-07-19 VITALS
BODY MASS INDEX: 36.15 KG/M2 | RESPIRATION RATE: 16 BRPM | OXYGEN SATURATION: 97 % | DIASTOLIC BLOOD PRESSURE: 90 MMHG | HEIGHT: 65 IN | WEIGHT: 217 LBS | TEMPERATURE: 98.5 F | SYSTOLIC BLOOD PRESSURE: 146 MMHG | HEART RATE: 126 BPM

## 2024-07-19 PROBLEM — E86.0 DEHYDRATION: Status: RESOLVED | Noted: 2024-07-18 | Resolved: 2024-07-19

## 2024-07-19 LAB
ALBUMIN SERPL-MCNC: 3.4 G/DL (ref 3.5–5.2)
ALBUMIN/GLOB SERPL: 1.5 G/DL
ALP SERPL-CCNC: 71 U/L (ref 39–117)
ALT SERPL W P-5'-P-CCNC: 17 U/L (ref 1–33)
ANION GAP SERPL CALCULATED.3IONS-SCNC: 14 MMOL/L (ref 5–15)
AST SERPL-CCNC: 12 U/L (ref 1–32)
BASOPHILS # BLD AUTO: 0.04 10*3/MM3 (ref 0–0.2)
BASOPHILS NFR BLD AUTO: 0.6 % (ref 0–1.5)
BILIRUB SERPL-MCNC: 0.7 MG/DL (ref 0–1.2)
BUN SERPL-MCNC: 6 MG/DL (ref 6–20)
BUN/CREAT SERPL: 10.9 (ref 7–25)
CALCIUM SPEC-SCNC: 8.2 MG/DL (ref 8.6–10.5)
CHLORIDE SERPL-SCNC: 102 MMOL/L (ref 98–107)
CO2 SERPL-SCNC: 21 MMOL/L (ref 22–29)
CREAT SERPL-MCNC: 0.55 MG/DL (ref 0.57–1)
DEPRECATED RDW RBC AUTO: 42.4 FL (ref 37–54)
EGFRCR SERPLBLD CKD-EPI 2021: 126.6 ML/MIN/1.73
EOSINOPHIL # BLD AUTO: 0.15 10*3/MM3 (ref 0–0.4)
EOSINOPHIL NFR BLD AUTO: 2.4 % (ref 0.3–6.2)
ERYTHROCYTE [DISTWIDTH] IN BLOOD BY AUTOMATED COUNT: 13.8 % (ref 12.3–15.4)
GLOBULIN UR ELPH-MCNC: 2.2 GM/DL
GLUCOSE SERPL-MCNC: 85 MG/DL (ref 65–99)
HCT VFR BLD AUTO: 29.1 % (ref 34–46.6)
HGB BLD-MCNC: 9.6 G/DL (ref 12–15.9)
IMM GRANULOCYTES # BLD AUTO: 0.25 10*3/MM3 (ref 0–0.05)
IMM GRANULOCYTES NFR BLD AUTO: 4 % (ref 0–0.5)
LYMPHOCYTES # BLD AUTO: 1.13 10*3/MM3 (ref 0.7–3.1)
LYMPHOCYTES NFR BLD AUTO: 17.9 % (ref 19.6–45.3)
MCH RBC QN AUTO: 28.7 PG (ref 26.6–33)
MCHC RBC AUTO-ENTMCNC: 33 G/DL (ref 31.5–35.7)
MCV RBC AUTO: 86.9 FL (ref 79–97)
MONOCYTES # BLD AUTO: 1.19 10*3/MM3 (ref 0.1–0.9)
MONOCYTES NFR BLD AUTO: 18.8 % (ref 5–12)
NEUTROPHILS NFR BLD AUTO: 3.56 10*3/MM3 (ref 1.7–7)
NEUTROPHILS NFR BLD AUTO: 56.3 % (ref 42.7–76)
NRBC BLD AUTO-RTO: 0 /100 WBC (ref 0–0.2)
PLATELET # BLD AUTO: 408 10*3/MM3 (ref 140–450)
PMV BLD AUTO: 8.8 FL (ref 6–12)
POTASSIUM SERPL-SCNC: 4.2 MMOL/L (ref 3.5–5.2)
PROT SERPL-MCNC: 5.6 G/DL (ref 6–8.5)
RBC # BLD AUTO: 3.35 10*6/MM3 (ref 3.77–5.28)
SODIUM SERPL-SCNC: 137 MMOL/L (ref 136–145)
WBC NRBC COR # BLD AUTO: 6.32 10*3/MM3 (ref 3.4–10.8)

## 2024-07-19 PROCEDURE — 96376 TX/PRO/DX INJ SAME DRUG ADON: CPT

## 2024-07-19 PROCEDURE — 25010000002 NA FERRIC GLUC CPLX PER 12.5 MG: Performed by: SURGERY

## 2024-07-19 PROCEDURE — 99024 POSTOP FOLLOW-UP VISIT: CPT | Performed by: SURGERY

## 2024-07-19 PROCEDURE — 25010000002 ENOXAPARIN PER 10 MG: Performed by: SURGERY

## 2024-07-19 PROCEDURE — 25010000002 THIAMINE PER 100 MG: Performed by: SURGERY

## 2024-07-19 PROCEDURE — 96372 THER/PROPH/DIAG INJ SC/IM: CPT

## 2024-07-19 PROCEDURE — 85025 COMPLETE CBC W/AUTO DIFF WBC: CPT | Performed by: SURGERY

## 2024-07-19 PROCEDURE — 96361 HYDRATE IV INFUSION ADD-ON: CPT

## 2024-07-19 PROCEDURE — 25010000002 POTASSIUM CHLORIDE PER 2 MEQ: Performed by: SURGERY

## 2024-07-19 PROCEDURE — 80053 COMPREHEN METABOLIC PANEL: CPT | Performed by: SURGERY

## 2024-07-19 PROCEDURE — G0378 HOSPITAL OBSERVATION PER HR: HCPCS

## 2024-07-19 RX ORDER — HYDROCODONE BITARTRATE AND ACETAMINOPHEN 5; 325 MG/1; MG/1
1 TABLET ORAL EVERY 4 HOURS PRN
Qty: 10 TABLET | Refills: 0 | Status: SHIPPED | OUTPATIENT
Start: 2024-07-19 | End: 2024-07-30

## 2024-07-19 RX ORDER — SIMETHICONE 80 MG
80 TABLET,CHEWABLE ORAL 4 TIMES DAILY PRN
Status: DISCONTINUED | OUTPATIENT
Start: 2024-07-19 | End: 2024-07-19 | Stop reason: HOSPADM

## 2024-07-19 RX ADMIN — THIAMINE HYDROCHLORIDE 100 MG: 100 INJECTION, SOLUTION INTRAMUSCULAR; INTRAVENOUS at 09:15

## 2024-07-19 RX ADMIN — ENOXAPARIN SODIUM 40 MG: 40 INJECTION SUBCUTANEOUS at 09:15

## 2024-07-19 RX ADMIN — HYDROMORPHONE HYDROCHLORIDE 2 MG: 2 TABLET ORAL at 00:21

## 2024-07-19 RX ADMIN — PANTOPRAZOLE SODIUM 40 MG: 40 INJECTION, POWDER, LYOPHILIZED, FOR SOLUTION INTRAVENOUS at 09:15

## 2024-07-19 RX ADMIN — POTASSIUM CHLORIDE AND SODIUM CHLORIDE 125 ML/HR: 450; 150 INJECTION, SOLUTION INTRAVENOUS at 00:22

## 2024-07-19 NOTE — PROGRESS NOTES
"HD#2 dehydration  \"want to go home\"  POD#7 RNY Beth    She has been waiting on me to make rounds and go home.  Unfortunately I was stuck in surgery all day.  Her parents are in the room.  She is concerned about the x-ray report showing a bowel obstruction.  She is tolerating cottage cheese, applesauce, and peanut butter without nausea or vomiting.  Diarrhea has resolved.  She is only passing a small amount of flatus.  She does not feel bloated or distended.  She is wondering if it is normal to have some ecchymosis in her subcutaneous tissue of her left flank.  She is also concerned that she has pain in her lower back that is fairly severe with deep inspiration and wonders what it could be.  Ambulating and voiding well, no pulmonary complaints.    No fever pulse 126 respiration 16 blood pressure 146/90 room air sat 97%. UO NR.  She is in no apparent distress, looks well and conversant.  Abdomen is soft, nondistended, nontender, bowel sounds normal active.  Wounds all look okay, she does have a relatively small area of ecchymosis along the dependent area of her left lateral flank that is soft and nontender.  Other wounds with small amount of ecchymosis, healing well otherwise.    CMP normal except for creatinine 0.55 CO2 21 calcium 8.2 total protein 5.6 abdomen 3.4.  White count is normal at 6.32 with an unremarkable differential except for 18% lymphocytes 90% monocytes 3% eosinophils 4% immature granulocytes no bands H&H is 9.6 and 29.1.    Impression: Hospital day #2, 1 week status post laparoscopic Justino-en-Y gastric bypass complicated by intraperitoneal bleed not requiring transfusion readmitted this admission for dehydration.  No signs of ongoing active bleeding.  No signs clinically or by my review of her abdominal films yesterday of small bowel obstruction.  Inspiratory low left back pain without significant abnormality on CT angio of the chest other than moderate bilateral discoid atelectasis.  Also " incidentally noted a smooth round pleural-based 8 mm right lower lobe pulmonary nodule.    Plan: I offered her to stay additional time and repeat her abdominal films and have medicine see her to evaluate for her inspiratory back pain but she declined and would like to go home.  Requests hydrocodone to be [facility] into [facility] in [place] so she can pick it up tomorrow.  I offered to send it into a 24-hour pharmacy nearby but she declined.  Hydrocodone 5 mg / 325 mg every 4 hours as needed #10.  She is to continue her other medications and says she does not need any other prescriptions.  She says she will stay hydrated at home.  She is to call in the meantime with any problems questions or concerns otherwise follow-up in the office next week.  See orders

## 2024-07-19 NOTE — PLAN OF CARE
Goal Outcome Evaluation:  Plan of Care Reviewed With: patient        Progress: improving  Outcome Evaluation: VSS. RA. Alert and oriented x4. No complaint of pain or nausea. PRN simethicone given for gas discomfort. Lap sites clean and dry. Iron infused this AM. Fluids infusing. Ambulating frequenly. Tolerating protein. Pt resting comfortably. No further complaints at this time. Pt hopeful for discharge.

## 2024-07-20 NOTE — DISCHARGE SUMMARY
Date of admission:   7/17/2024    Date of discharge:   7/20/2024    Admission Diagnosis:   Dehydration with nausea, poor oral intake substernal dysphagia/pain, weakness/dizziness when walking POD#5 laparoscopic revision previous sleeve gastrectomy (Dr. Bird 3/21) to Justino-en-Y gastric bypass and hiatal hernia repair complicated by postoperative intraperitoneal bleeding    Discharge Diagnosis:  Same    Other diagnoses:    Anxiety, chronic low back pain, carpal tunnel syndrome, H. pylori gastritis, history of MRSA, hypertension, joint pain, obstructive sleep apnea, palpitations, PCOS, prediabetes, vitamin B deficiency, iron deficiency anemia, bilateral basilar discoid atelectasis, right lower lobe 8 mm pulmonary nodule, transient thrombocytosis, low albumin and prealbumin    Principal Procedure Performed: CT scan of the abdomen pelvis and CT angiogram of the chest 7/17/2024    Other procedures performed:    Flat and upright abdominal films 7/18/2024    Complications: None    Consultations: None    History of present illness:  Annie Kent is a 28 y.o. year old female POD#5 conversion of sleeve to RYGB and HHR with falciform ligament reinforcement.  She presented to ED with poor oral intake, secondary to nausea and substernal dysphagia/pain, and weakness/dizziness when walking.  She also c/o of incompletely controlled abdoiminal pain.     The night of her surgery, she had hypotension with large bloody CATINA output, consistent with post-operative hemorrhage, which was self-limited and required no blood transfusion or reoperation.  Serial hemoglobins were checked, and stabilized on POD#2.  Post op UGI suggested ileus vs. Early SBO, but she was drinking well and wanted to be discharged.       She rceived 2L of IVF in ED and felt much better.  She has had a liquid BM today and has passed flatus.  At home, she was only able to tolerate a little water and hot cocoa, but notes she tried to drink on a schedule.    On exam she  was in no acute distress abdomen was soft without distention bowel sounds were absent incisions were healing well.  Urinalysis specific gravity 1.024 with greater than 160 ketones, microscopic unremarkable, D-dimer elevated at 4.21, CMP normal except for CO2 of 18 and anion gap of 19.  Lipase elevated at 64.  HS troponin T was normal.  Lactate normal 1.1.  White count 11.31 with an unremarkable differential except for 11% lymphocytes 1% immature granulocytes.  H&H 11.1 and 34.1 platelet count elevated 459.  CT angio of the chest showed some moderate motion related image blurring with no evidence of pulmonary embolism or other clearly acute chest disease noted was moderate bilateral discoid atelectasis and a smooth round pleural-based 8 mm right lower lobe pulmonary nodule with note that guidelines do not apply to patients younger than 35 and suggest follow-up imaging on a case-by-case basis based on the patient's clinical findings.  CT of the abdomen was consistent with recent gastric bypass and focal dense collection suspected to represent aging postoperative hematoma and relatively mild proximal small bowel obstruction associated with some mesenteric edema.  Also noted delayed contrast passage from previous upper GI suggesting superimposed ileus as well.        Hospital Course:  The patient was admitted and received aggressive IV fluid hydration, pain and nausea medication as needed.  She was given a rally bag, daily IV thiamine, IV PPI.  She was passing flatus and having which she described as foul-smelling diarrhea.  C. difficile could not be ordered fecal leukocyte was ordered however her diarrhea resolved prior to obtaining a sample.  The following day she felt better.  She said the Premier protein shakes had a bad taste and she could not tolerate them and her  was can to get her a different type of protein.  At the time of her gastric bypass she had a low prealbumin of 16.5.  Repeat this admission was  9.3 and the importance of increased protein intake discussed with the patient.  After hydration her H&H returned to her baseline with a hemoglobin in the 9 range.  Plain films the following day were read as small bowel obstruction although on my review there was a nonspecific bowel gas pattern and air and contrast and throughout the relatively decompressed colon.  The patient was tolerating her requested foods including cottage cheese, applesauce, and peanut butter.  No nausea or vomiting.  Passing flatus and once again no further diarrhea.  At the time of discharge on hospital day #3 she had a few complaints but very much wanted to go home.  She was concerned about the x-ray showing bowel obstruction.  She was also concerned about some left flank ecchymosis.  Lastly she was concerned about inspiratory low to mid back pain which she described as fairly severe.  She was tachycardic to 126.  No orthostatic symptoms.  Blood pressure 146/90.  Room air sat 97%.  Abdomen benign with some soft area of left flank ecchymosis.    CMP normal except for creatinine 0.55 CO2 21 calcium 8.2 total protein 5.6 abdomen 3.4. White count is normal at 6.32 with an unremarkable differential except for 18% lymphocytes 90% monocytes 3% eosinophils 4% immature granulocytes no bands H&H is 9.6 and 29.1.             I offered her to stay additional time and repeat her abdominal films and have medicine see her to evaluate for her inspiratory back pain but she declined and would like to go home.  Requests [facility] in [place] so she can pick it up tomorrow.  I offered to send it into a 24-hour pharmacy nearby but she declined.  Hydrocodone 5 mg / 325 mg every 4 hours as needed #10.  She is to continue her other medications and says she does not need any other prescriptions.  She says she will stay hydrated at home.  She is to call in the meantime with any problems questions or concerns otherwise  follow-up in the office next week.  Message sent to Kelin BETTS PA-C for the patient to follow-up her pulmonary nodule with her PCP.

## 2024-07-28 ENCOUNTER — PATIENT MESSAGE (OUTPATIENT)
Dept: BARIATRICS/WEIGHT MGMT | Facility: CLINIC | Age: 29
End: 2024-07-28
Payer: COMMERCIAL

## 2024-07-30 ENCOUNTER — OFFICE VISIT (OUTPATIENT)
Dept: BARIATRICS/WEIGHT MGMT | Facility: CLINIC | Age: 29
End: 2024-07-30
Payer: COMMERCIAL

## 2024-07-30 VITALS
SYSTOLIC BLOOD PRESSURE: 122 MMHG | BODY MASS INDEX: 33.17 KG/M2 | WEIGHT: 206.4 LBS | HEIGHT: 66 IN | TEMPERATURE: 97.1 F | HEART RATE: 94 BPM | DIASTOLIC BLOOD PRESSURE: 74 MMHG

## 2024-07-30 DIAGNOSIS — R79.0 ABNORMAL BLOOD LEVEL OF IRON: ICD-10-CM

## 2024-07-30 DIAGNOSIS — Z13.21 ENCOUNTER FOR VITAMIN DEFICIENCY SCREENING: ICD-10-CM

## 2024-07-30 DIAGNOSIS — E55.9 VITAMIN D DEFICIENCY: ICD-10-CM

## 2024-07-30 DIAGNOSIS — D64.9 ANEMIA, UNSPECIFIED TYPE: ICD-10-CM

## 2024-07-30 DIAGNOSIS — R53.83 FATIGUE, UNSPECIFIED TYPE: ICD-10-CM

## 2024-07-30 DIAGNOSIS — K90.9 INTESTINAL MALABSORPTION, UNSPECIFIED TYPE: Primary | ICD-10-CM

## 2024-07-30 PROCEDURE — 99024 POSTOP FOLLOW-UP VISIT: CPT | Performed by: PHYSICIAN ASSISTANT

## 2024-07-30 RX ORDER — CYANOCOBALAMIN 1000 UG/ML
1000 INJECTION, SOLUTION INTRAMUSCULAR; SUBCUTANEOUS DAILY
Qty: 14 ML | Refills: 0 | Status: SHIPPED | OUTPATIENT
Start: 2024-07-30 | End: 2024-08-13

## 2024-07-30 RX ORDER — THIAMINE HYDROCHLORIDE 100 MG/ML
100 INJECTION, SOLUTION INTRAMUSCULAR; INTRAVENOUS DAILY
Qty: 14 ML | Refills: 0 | Status: SHIPPED | OUTPATIENT
Start: 2024-07-30 | End: 2024-08-13

## 2024-07-30 NOTE — PROGRESS NOTES
"[facility]  [address]  [address]  [facility] [id number] [phone]      Patient Name:  Annie Kent  :  1995      Date of Visit: 2024      Reason for Visit:  POD #18    HPI:  Annie Kent is a 28 y.o. female s/p robotic RNY/HHR 24 GDW (for pGOO w/ chronic nausea), hx LSG 3/2021 w/ Dr. Bird [facility]    Surgery complicated by intraperitoneal bleed not requiring transfusion, discharged on POD#2, readmitted on POD#5 w/ dehydration, discharged 2 days later, although Dr. Arce offered for her to stay longer if needed.  Added on today for further eval after she sent a [employer] message complaining of low energy, \"not feeling myself.\"    Looks well in office - AVSS.  Says feeling better than when she was in the hospital, but struggles w/ daily activity d/t fatigue - feels like she is going to pass out after showering, or household chores, etc.       Getting 30-60g prot/day, not really tracking.  Adding protein shakes to coffee and eating eggs and cottage cheese.  Tolerating PO w/out issue, although does c/o gas and bloating.  Denies dysphagia/reflux.  Had some LUQ pain + nausea upon awakening this AM, but took Omeprazole and it helped.  Took Zofran 2 days ago, not needing often.  Denies vomiting.  Last BM 2 days ago w/ associated abd.cramping, had cold sweats during BM, stool was not hard.  Taking daily stool softener.  Took Senna yesterday and today.  Drinking Liquid IV, Gatorade Zero, and lemon water - maybe 30oz total.  Urinating well - urine light yellow.  Denies fever.     Does not feel able to go back to work - says she just can't function - in addition to the fatigue, feels like she is in a daze, equilibrium is off.    Saw PCP on Friday, labs done, but she has not gotten results.      Wearing MVI, B12, and iron patches.  Avoiding ASA/NSAIDS/steroids/tobacco.       PreRNY weight: 230 pounds.  Today's weight is 93.6 kg (206 lb 6.4 oz) pounds, today's  " Body mass index is 33.31 kg/m²., and weight loss since surgery is 24 pounds.       Past Medical History:   Diagnosis Date    Anxiety 2017    Back pain     chiropractor; PRN nsaids    Carpal tunnel syndrome, left     Chronic nausea     chronic since LSG    GERD (gastroesophageal reflux disease)     Omeprazole daily. EGD prior to LSG;    H. pylori infection     EGD Dr. Arce 3/24 - abundant h. pylori, PrevPak prescribed, re-test pending    H/O gastric sleeve     Dr. Bird 3/2021    Hiatal hernia     History of abnormal cervical Pap smear     History of MRSA infection     skin boil at buttock as child    History of postpartum hemorrhage     History of pre-eclampsia 2018    History of twin pregnancy in prior pregnancy     Hypertension     Joint pain     Left lateral epicondylitis     cortisone injections and PT    Palpitations     with high stress    Polycystic ovarian disease     PONV (postoperative nausea and vomiting)     Postpartum spinal headache     Prediabetes     per external records; controlled post-sleeve    Tendonitis of elbow, left     Vitamin B deficiency     Wears eyeglasses      Past Surgical History:   Procedure Laterality Date     SECTION      lower transverse     SECTION      COLPOSCOPY W/ BIOPSY / CURETTAGE      negative, per patient    D & C WITH SUCTION      EAB    ENDOSCOPY  2024    with biopsies    ENDOSCOPY N/A 2024    Procedure: ESOPHAGOGASTRODUODENOSCOPY;  Surgeon: Rhett Arce MD;  Location: [facility] OR;  Service: General;  Laterality: N/A;  SCOPE     GASTRIC BYPASS N/A 2024    Procedure: OLIVER-EN-Y GASTRIC BYPASS LAPAROSCOPIC WITH DAVINCI ROBOT;  Surgeon: Rhett Arce MD;  Location: [facility] OR;  Service: Robotics - DaVinci;  Laterality: N/A;    GASTRIC SLEEVE LAPAROSCOPIC  2021    Dr. Bird    HIATAL HERNIA REPAIR N/A 2024    Procedure: LAPAROSCOPIC HIATAL HERNIA REPAIR WITH DAVINCI ROBOT;  Surgeon: Rhett Arce  "MD Francisco;  Location[facility];  Service: Robotics - DaVinci;  Laterality: N/A;  HIATAL HERNIA TIME: [id number]    LAPAROSCOPIC CHOLECYSTECTOMY  2010    gallstones    WISDOM TOOTH EXTRACTION  2015     Outpatient Medications Marked as Taking for the 7/30/24 encounter (Office Visit) with Kelin Spangler PA   Medication Sig Dispense Refill    acetaminophen (TYLENOL) 500 MG tablet Take 1 tablet by mouth Every 6 (Six) Hours As Needed for Mild Pain.      cetirizine (zyrTEC) 10 MG tablet Take 1 tablet by mouth Daily.      cyanocobalamin 1000 MCG/ML injection INJECT 1ML INTRAMUSCULARLY ONCE WEEKLY FOR FOUR WEEKS, THEN INJECT 1ML INTRAMUSCULARLY ONCE MONTHLY THEREAFTER      cyclobenzaprine (FLEXERIL) 10 MG tablet Take 1 tablet by mouth Every 8 (Eight) Hours. 45 tablet 0    NON FORMULARY PatchAid: MV, D, B12, B1, Iron      omeprazole (priLOSEC) 40 MG capsule Take 1 capsule by mouth Daily. 30 capsule 0    ondansetron ODT (ZOFRAN-ODT) 4 MG disintegrating tablet Place 1 tablet on the tongue Every 8 (Eight) Hours As Needed for Nausea or Vomiting.      valACYclovir (VALTREX) 500 MG tablet Take 1 tablet by mouth As Needed (zoster). prn       Allergies   Allergen Reactions    Lisinopril Cough       Social History     Socioeconomic History    Marital status:    Tobacco Use    Smoking status: Never    Smokeless tobacco: Never   Vaping Use    Vaping status: Some Days   Substance and Sexual Activity    Alcohol use: Yes     Comment: once a month, social    Drug use: No    Sexual activity: Defer     Partners: Male     Birth control/protection: Vasectomy     Social History     Social History Narrative    Lives in [place], [place].   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       /74 (BP Location: Left arm, Patient Position: Sitting)   Pulse 94   Temp 97.1 °F (36.2 °C)   Ht 167.6 cm (66\")   Wt 93.6 kg (206 lb 6.4 oz)   BMI 33.31 kg/m²     Physical Exam  Constitutional:       " Appearance: She is well-developed. She is not ill-appearing.   Eyes:      General: No scleral icterus.  Cardiovascular:      Rate and Rhythm: Normal rate.   Pulmonary:      Effort: Pulmonary effort is normal.   Abdominal:      Palpations: Abdomen is soft.      Tenderness: There is no abdominal tenderness.      Comments: incisions healing well   Musculoskeletal:         General: Normal range of motion.   Skin:     General: Skin is warm and dry.      Findings: No rash.   Neurological:      Mental Status: She is alert.   Psychiatric:         Behavior: Behavior is cooperative.         Judgment: Judgment normal.           Assessment:   POD #18 s/p robotic RNY/HHR 7/12/24 GDW (for pGOO w/ chronic nausea), hx LSG 3/2021 w/ Dr. [facility]      ICD-10-CM ICD-9-CM   1. Intestinal malabsorption, unspecified type  K90.9 579.9   2. Fatigue, unspecified type  R53.83 780.79   3. Vitamin D deficiency  E55.9 268.9   4. Abnormal blood level of iron  R79.0 790.6   5. Anemia, unspecified type  D64.9 285.9   6. Encounter for vitamin deficiency screening  Z13.21 V77.99     BMI is >= 30 and <35. (Class 1 Obesity). The following options were offered after discussion;: see plan.         Plan:  Full bariatric lab panel ordered.  Will assess possible need for IV iron infusion.  RX Vitamin B1 and B12 injections daily x 2 wks.  Discussed importance of adequate protein intake.  Advised 100g prot/day - suggested unflavored Vital Protein as an additional protein source while diet limited.  Continue to advance diet per manual, as instructed.  Continue PPI.  Continue vitamins.  Continue to avoid ASA/NSAIDS/steroids/tobacco indefinitely.  RTW 8/14/24.  Call w/ problems/concerns.    The patient was instructed to follow up in 2 weeks, sooner if needed.

## 2024-07-30 NOTE — TELEPHONE ENCOUNTER
Called pt to get more details on what her issues are. She stated that since surgery, she is in a constant daze and that her equilibrium is off. She stated that when she does simple task or self care like showering, her heart rate goes high, she gets short of breath, and lightheaded. She denies any N/V, fever, or chills.     I asked how many grams of protein she is getting in per day. Pt stated that she cannot drink the protein shakes, eats cottage cheese and boiled eggs. She also does protein shots that have 25g of protein, she stated she tries to get in one per day if able. She really does not track her PO intake but estimates 50-60 grams of protein per day.   I asked how many oz of fluids she is getting in per day. Pt stated that she drinks one bottle of water with a packet of sugar free Liquid IV, Gatorade zero, and lemon water. She assumed she gets ~30 oz in per day.   I asked about her vitamins and what she takes. She stated that she uses the PatchAid patches and she does the MVI, B12, and Iron.     I spoke with Kelin Cordero directly in person about this call. Pt has not had 1 week post op visit from her RNY on 07/17, needs in office visit for further evaluation. Pt has sent multiple [facility] messages about these issues and was advised that she needs to be seen in office but declined.     I then called pt back, advised her that she needs to be seen in office, not via MyChart VV. Pt voiced understanding, transferred up front to schedule OV with LC. Per LC it is ok to add on today or double book if needed.

## 2024-08-01 ENCOUNTER — TELEPHONE (OUTPATIENT)
Dept: BARIATRICS/WEIGHT MGMT | Facility: CLINIC | Age: 29
End: 2024-08-01
Payer: COMMERCIAL

## 2024-08-01 NOTE — TELEPHONE ENCOUNTER
Contacted patient.  Informed preliminary labs are look good, her anemia is improving, iron okay, protein level acceptable. Continue current vitamin regimen for now. Additional input pending the rest of the results when they come in.    Patient verbalized understanding.  Patient did request a work note stating that she is to return 08/14/2024.  Note has been faxed to her work.    No further questions.

## 2024-08-07 ENCOUNTER — TELEPHONE (OUTPATIENT)
Dept: BARIATRICS/WEIGHT MGMT | Facility: CLINIC | Age: 29
End: 2024-08-07
Payer: COMMERCIAL

## 2024-08-07 LAB
25(OH)D3+25(OH)D2 SERPL-MCNC: 43.3 NG/ML (ref 30–100)
A-TOCOPHEROL VIT E SERPL-MCNC: 6.8 MG/L (ref 5.9–19.4)
ALBUMIN SERPL-MCNC: 4.4 G/DL (ref 4–5)
ALP SERPL-CCNC: 76 IU/L (ref 44–121)
ALT SERPL-CCNC: 13 IU/L (ref 0–32)
AST SERPL-CCNC: 19 IU/L (ref 0–40)
BASOPHILS # BLD AUTO: 0.1 X10E3/UL (ref 0–0.2)
BASOPHILS NFR BLD AUTO: 1 %
BILIRUB SERPL-MCNC: 0.5 MG/DL (ref 0–1.2)
BUN SERPL-MCNC: 9 MG/DL (ref 6–20)
BUN/CREAT SERPL: 16 (ref 9–23)
CALCIUM SERPL-MCNC: 9.6 MG/DL (ref 8.7–10.2)
CHLORIDE SERPL-SCNC: 98 MMOL/L (ref 96–106)
CO2 SERPL-SCNC: 20 MMOL/L (ref 20–29)
COPPER SERPL-MCNC: 132 UG/DL (ref 80–158)
CREAT SERPL-MCNC: 0.58 MG/DL (ref 0.57–1)
EGFRCR SERPLBLD CKD-EPI 2021: 125 ML/MIN/1.73
EOSINOPHIL # BLD AUTO: 0.1 X10E3/UL (ref 0–0.4)
EOSINOPHIL NFR BLD AUTO: 2 %
ERYTHROCYTE [DISTWIDTH] IN BLOOD BY AUTOMATED COUNT: 13.9 % (ref 11.7–15.4)
FERRITIN SERPL-MCNC: 542 NG/ML (ref 15–150)
FOLATE SERPL-MCNC: 10.1 NG/ML
GAMMA TOCOPHEROL SERPL-MCNC: 1 MG/L (ref 0.7–4.9)
GLOBULIN SER CALC-MCNC: 3.1 G/DL (ref 1.5–4.5)
GLUCOSE SERPL-MCNC: 82 MG/DL (ref 70–99)
HCT VFR BLD AUTO: 37.6 % (ref 34–46.6)
HGB BLD-MCNC: 11.8 G/DL (ref 11.1–15.9)
IMM GRANULOCYTES # BLD AUTO: 0.1 X10E3/UL (ref 0–0.1)
IMM GRANULOCYTES NFR BLD AUTO: 1 %
INR PPP: 1.1 (ref 0.9–1.2)
IRON SERPL-MCNC: 36 UG/DL (ref 27–159)
LYMPHOCYTES # BLD AUTO: 1.3 X10E3/UL (ref 0.7–3.1)
LYMPHOCYTES NFR BLD AUTO: 16 %
MAGNESIUM SERPL-MCNC: 1.9 MG/DL (ref 1.6–2.3)
MCH RBC QN AUTO: 27.4 PG (ref 26.6–33)
MCHC RBC AUTO-ENTMCNC: 31.4 G/DL (ref 31.5–35.7)
MCV RBC AUTO: 87 FL (ref 79–97)
METHYLMALONATE SERPL-SCNC: 86 NMOL/L (ref 0–378)
MONOCYTES # BLD AUTO: 0.8 X10E3/UL (ref 0.1–0.9)
MONOCYTES NFR BLD AUTO: 9 %
NEUTROPHILS # BLD AUTO: 6.1 X10E3/UL (ref 1.4–7)
NEUTROPHILS NFR BLD AUTO: 71 %
PHOSPHATE SERPL-MCNC: 4 MG/DL (ref 3–4.3)
PHYTONADIONE SERPL-MCNC: 0.19 NG/ML (ref 0.1–2.2)
PLATELET # BLD AUTO: 409 X10E3/UL (ref 150–450)
POTASSIUM SERPL-SCNC: 4.5 MMOL/L (ref 3.5–5.2)
PREALB SERPL-MCNC: 17 MG/DL (ref 14–35)
PROT SERPL-MCNC: 7.5 G/DL (ref 6–8.5)
PROTHROMBIN TIME: 11.3 SEC (ref 9.1–12)
PTH-INTACT SERPL-MCNC: 17 PG/ML (ref 15–65)
RBC # BLD AUTO: 4.3 X10E6/UL (ref 3.77–5.28)
SODIUM SERPL-SCNC: 139 MMOL/L (ref 134–144)
VIT A SERPL-MCNC: 24.8 UG/DL (ref 18.9–57.3)
VIT B1 BLD-SCNC: 125.5 NMOL/L (ref 66.5–200)
WBC # BLD AUTO: 8.4 X10E3/UL (ref 3.4–10.8)
ZINC SERPL-MCNC: 106 UG/DL (ref 44–115)

## 2024-08-07 NOTE — TELEPHONE ENCOUNTER
Spoke with pt. Pt knows to arrive at [address] in [place] at 1030am for a CT scan at 1130. Pt knows to be NPO 2 hrs prior to arrival time. Pt verbalized understanding.l

## 2024-08-07 NOTE — TELEPHONE ENCOUNTER
----- Message from Kelin Spangler sent at 8/7/2024 11:16 AM EDT -----  Regarding: FW: Pain  Contact: [phone]  Discussed w/ Dr. Arce.  CT ordered for further eval.  See EPIC message.  Please schedule - thanks!  ----- Message -----  From: Desi Dahl MA  Sent: 8/5/2024   2:03 PM EDT  To: NICKI Julien  Subject: FW: Pain                                         Please advise, thanks!  ----- Message -----  From: Annie Kent  Sent: 8/5/2024   1:14 PM EDT  To: [facility]  Subject: Pain                                             It’s been pretty constant. It’s not horrible. It’s just annoying really. If I keep my hand on that area it’ll go away a little bit.   It feels almost like a muscle pulling in that area.

## 2024-08-08 ENCOUNTER — HOSPITAL ENCOUNTER (OUTPATIENT)
Dept: CT IMAGING | Facility: HOSPITAL | Age: 29
Discharge: HOME | End: 2024-08-08
Admitting: PHYSICIAN ASSISTANT
Payer: COMMERCIAL

## 2024-08-08 DIAGNOSIS — R10.12 LUQ PAIN: ICD-10-CM

## 2024-08-08 PROCEDURE — 25510000001 IOPAMIDOL 61 % SOLUTION: Performed by: PHYSICIAN ASSISTANT

## 2024-08-08 PROCEDURE — 74177 CT ABD & PELVIS W/CONTRAST: CPT

## 2024-08-08 RX ADMIN — IOPAMIDOL 95 ML: 612 INJECTION, SOLUTION INTRAVENOUS at 11:59

## 2024-08-13 ENCOUNTER — OFFICE VISIT (OUTPATIENT)
Dept: BARIATRICS/WEIGHT MGMT | Facility: CLINIC | Age: 29
End: 2024-08-13
Payer: COMMERCIAL

## 2024-08-13 VITALS
RESPIRATION RATE: 16 BRPM | SYSTOLIC BLOOD PRESSURE: 118 MMHG | DIASTOLIC BLOOD PRESSURE: 76 MMHG | HEIGHT: 66 IN | TEMPERATURE: 97.3 F | BODY MASS INDEX: 32.82 KG/M2 | OXYGEN SATURATION: 99 % | WEIGHT: 204.2 LBS | HEART RATE: 88 BPM

## 2024-08-13 DIAGNOSIS — K66.1 INTRAPERITONEAL HEMATOMA: ICD-10-CM

## 2024-08-13 DIAGNOSIS — Z98.84 HISTORY OF ROUX-EN-Y GASTRIC BYPASS: Primary | ICD-10-CM

## 2024-08-13 PROCEDURE — 99024 POSTOP FOLLOW-UP VISIT: CPT | Performed by: SURGERY

## 2024-08-13 NOTE — LETTER
2024     MAGO Vizcarra  [address]  [address]  [facility] [address]    Patient: Annie Kent   YOB: 1995   Date of Visit: 2024     Dear MAGO Vizcarra:       Thank you for referring Annie Kent to me for evaluation. Below are the relevant portions of my assessment and plan of care.    If you have questions, please do not hesitate to call me. I look forward to following Annie along with you.         Sincerely,        Rhett Arce MD        CC: No Recipients    Rhett Arce MD  [id number] [phone]  Sign when Signing Visit  [facility]  [address]  [address]  [facility] [address] [phone]      Patient Name:  Annie Kent.  :  1995      Date of Visit: 2024      Reason for Visit:  1 month postop revision LSG (2021) to RNY/HHR  for severe CHELSEY and partial GOO.  Dr. Arce 2024     HPI:  Annie Kent is a 28 y.o. female s/p status post laparoscopic robotic assisted revision of her previous sleeve gastrectomy (2021 Dr. Bird) to a Justino-en-Y gastric bypass, hiatal hernia repair with falciform ligament reinforcement.  This was complicated by an intraperitoneal hematoma for which she did receive 1 unit of packed red blood cells on the index admission early morning the day of discharge.    She was readmitted on postop day #5 with dehydration and discharged home 2 days later.  She has had issues with suboptimal oral intake abdominal pain gas and bloating fatigue and feeling she is in a daze and her equilibrium is off.  She was last seen in the office on 2024 and postop day #18 where Kelin BETTS PA-C noted she looked well.  She was down 24 pounds.  Full bariatric panel obtained and there was improvement in her prealbumin to 17, hemoglobin had increased to 11.8 from 9.63 weeks prior CMP was normal iron was normal MMA normal, other levels unremarkable.  She had called with some ongoing  "complaints and we obtained a CT scan performed on 8/7/2024.  This shows a 8.5 x 6 cm evolving liquefied hematoma.  I reviewed the images and also reviewed them with the patient.  She has some gas pains and occasional left subcostal pain.  She says she gets some nausea when she feels constipated but no vomiting and takes Zofran as needed.  She denies heartburn or difficulty swallowing but on questioning does get occasional substernal dysphagia.  She says she has constipation in the sense that her bowels do not move as often as she thinks they should, maybe every 2 to 3 days, it is not hard.  She is on a stool regimen.  She still has some fatigue and occasional dizziness.  She did return to work yesterday.  She is taking the vitamin patches.  She says she is not sure how much she is drinking but her urine is light yellow and she feels hydrated.  She is pleased with her weight loss.  She does get full easily with some postprandial epigastric pain.  She is currently getting in between 60 to 100 g of protein a day.  She is continuing her daily PPI and requested a refill sent to her pharmacy in [place].  She is avoiding ulcerogenic agents.  Medication list reviewed.  She is concerned about a spitting suture in her epigastric trocar site incision.  She denies any fever or chills.  She is also concerned about a knot and discomfort in her right lateral 12 mm trocar site incision.  She says she has been diligently avoiding corn syrup and that \"it is in everything\".  She says this is very different from the sleeve where she feels she lost about 60 pounds and then the weight loss just stopped.  She admits that she was not told to avoid corn syrup at that time.     Presurgery weight:  230 pounds. Today's weight is 92.6 kg (204 lb 3.2 oz) pounds, today's Body mass index is 32.96 kg/m²., and   weight loss since surgery is 26 pounds.       Past Medical History:   Diagnosis Date   • Anxiety 2017   • Back pain     chiropractor; " PRN nsaids   • Carpal tunnel syndrome, left    • Chronic nausea     chronic since LSG   • Diabetes mellitus    • GERD (gastroesophageal reflux disease)     Omeprazole daily. EGD prior to LSG;   • H. pylori infection     EGD Dr. Arce 3/24 - abundant h. pylori, PrevPak prescribed, re-test pending   • H/O gastric sleeve     Dr. Bird 3/2021   • Hiatal hernia    • History of abnormal cervical Pap smear    • History of MRSA infection     skin boil at buttock as child   • History of postpartum hemorrhage    • History of pre-eclampsia    • History of twin pregnancy in prior pregnancy    • Hypertension    • Joint pain    • Left lateral epicondylitis     cortisone injections and PT   • Palpitations     with high stress   • Polycystic ovarian disease    • PONV (postoperative nausea and vomiting)    • Postpartum spinal headache    • Prediabetes     per external records; controlled post-sleeve   • Tendonitis of elbow, left    • Vitamin B deficiency    • Wears eyeglasses      Past Surgical History:   Procedure Laterality Date   •  SECTION      lower transverse   •  SECTION     • COLPOSCOPY W/ BIOPSY / CURETTAGE      negative, per patient   • D & C WITH SUCTION      EAB   • ENDOSCOPY  2024    with biopsies   • ENDOSCOPY N/A 2024    Procedure: ESOPHAGOGASTRODUODENOSCOPY;  Surgeon: Rhett Arce MD;  Location:  [facility];  Service: General;  Laterality: N/A;  SCOPE    • GASTRIC BYPASS N/A 2024    Procedure: OLIVER-EN-Y GASTRIC BYPASS LAPAROSCOPIC WITH DAVINCI ROBOT;  Surgeon: Rhett Arce MD;  Location:  [facility];  Service: Robotics - DaVinci;  Laterality: N/A;   • GASTRIC SLEEVE LAPAROSCOPIC  2021    Dr. Bird   • HIATAL HERNIA REPAIR N/A 2024    Procedure: LAPAROSCOPIC HIATAL HERNIA REPAIR WITH DAVINCI ROBOT;  Surgeon: Rhett Arce MD;  Location:  [facility];  Service: Robotics - DaVinci;  Laterality: N/A;  HIATAL HERNIA TIME: 5971-0331   •  "LAPAROSCOPIC CHOLECYSTECTOMY  2010    gallstones   • WISDOM TOOTH EXTRACTION  2015     Outpatient Medications Marked as Taking for the 8/13/24 encounter (Office Visit) with Rhett Arce MD   Medication Sig Dispense Refill   • acetaminophen (TYLENOL) 500 MG tablet Take 1 tablet by mouth Every 6 (Six) Hours As Needed for Mild Pain.     • cetirizine (zyrTEC) 10 MG tablet Take 1 tablet by mouth Daily.     • cyclobenzaprine (FLEXERIL) 10 MG tablet Take 1 tablet by mouth Every 8 (Eight) Hours. 45 tablet 0   • NON FORMULARY PatchAid: MV, D, B12, B1, Iron     • omeprazole (priLOSEC) 40 MG capsule Take 1 capsule by mouth Daily. 30 capsule 0   • ondansetron ODT (ZOFRAN-ODT) 4 MG disintegrating tablet Place 1 tablet on the tongue Every 8 (Eight) Hours As Needed for Nausea or Vomiting.     • thiamine (B-1) 100 MG/ML injection Inject 1 mL into the appropriate muscle as directed by prescriber Daily for 14 days. 14 mL 0   • valACYclovir (VALTREX) 500 MG tablet Take 1 tablet by mouth As Needed (zoster). prn       Allergies   Allergen Reactions   • Lisinopril Cough       Social History     Socioeconomic History   • Marital status:    Tobacco Use   • Smoking status: Never   • Smokeless tobacco: Never   Vaping Use   • Vaping status: Some Days   Substance and Sexual Activity   • Alcohol use: Yes     Comment: once a month, social   • Drug use: No   • Sexual activity: Defer     Partners: Male     Birth control/protection: Vasectomy       /76 (BP Location: Left arm, Patient Position: Sitting)   Pulse 88   Temp 97.3 °F (36.3 °C)   Resp 16   Ht 167.6 cm (66\")   Wt 92.6 kg (204 lb 3.2 oz)   LMP 07/12/2024   SpO2 99%   BMI 32.96 kg/m²     Physical Exam  She looks well, pleasant conversant in no apparent distress.  Abdomen soft and benign.  Incisions of healed nicely.  Right lower quadrant 12 mm trocar site deep tissue appropriate and as expected on exam.  Tiny spitting suture 3-0 Monocryl plus pitting in the " lateral left epigastric 5 mm trocar site incision removed.    Assessment:  1 month s/p revision previous sleeve gastrectomy (Dr. Bird 2021) for hiatal hernia and partial gastric out obstruction to Justino-en-Y gastric bypass and concomitant hiatal hernia repair.  Complicated by postoperative intraperitoneal hematoma receiving 1 unit of packed red blood cells index admission and readmitted postoperative day #5 for couple of days.  Preoperative reflux and partial gastric outlet obstruction symptoms remain resolved.  Slow recovery but overall doing better.  Feels better, tolerating her diet, has returned to work.  CT scan shows an 8.5 x 6 cm evolving liquefied hematoma.  In discussion with the patient and she feels it is mildly symptomatic.        Plan: I offered her admission and CT percutaneous drainage.  I do think over time it will completely resolve but it may take several weeks to a few months.  Right now she would like to proceed with observation as overall she feels well.  I encouraged her to contact us if she changes her mind.  Continue to advance diet per manual.  Continue protein >100g/day.  Encouraged good food choices - high protein, low carb.  Continue routine exercise.  Recent bariatric labs reviewed as above.  Call w/ problems/concerns.    The patient was instructed to follow up in 2 months with a repeat CT scan and repeat bariatric labs at that time, sooner if needed.     Thank you Flory MERCEDES for the opportunity to participate in the care of Mrs. Kent.    Rhett Arce MD    Answers submitted by the patient for this visit:  Primary Reason for Visit (Submitted on 8/7/2024)  What is the primary reason for your visit?: Abdominal Pain  Abdominal Pain Questionnaire (Submitted on 8/7/2024)  Chief Complaint: Abdominal pain  Chronicity: new  Onset: in the past 7 days  Onset quality: undetermined  Frequency: 2 to 4 times per day  Progression since onset: coming and going  Pain location: LUQ  Pain -  numeric: 3/10  Pain quality: aching, dull  Radiates to: LLQ, periumbilical region  anorexia: Yes  arthralgias: No  belching: Yes  constipation: Yes  diarrhea: No  dysuria: No  fever: No  flatus: Yes  frequency: No  hematochezia: No  hematuria: No  melena: No  myalgias: Yes  nausea: Yes  weight loss: Yes  vomiting: No  Aggravated by: certain positions, deep breathing, eating  Relieved by: bowel movements, palpation, passing flatus

## 2024-08-13 NOTE — PROGRESS NOTES
[facility]  [address]  [address]  [facility] [address]  [phone]      Patient Name:  Annie Kent.  :  1995      Date of Visit: 2024      Reason for Visit:  1 month postop revision LSG (2021) to RNY/HHR  for severe CHELSEY and partial GOO.  Dr. Arce 2024     HPI:  Annie Kent is a 28 y.o. female s/p status post laparoscopic robotic assisted revision of her previous sleeve gastrectomy (2021 Dr. Bird) to a Justino-en-Y gastric bypass, hiatal hernia repair with falciform ligament reinforcement.  This was complicated by an intraperitoneal hematoma for which she did receive 1 unit of packed red blood cells on the index admission early morning the day of discharge.    She was readmitted on postop day #5 with dehydration and discharged home 2 days later.  She has had issues with suboptimal oral intake abdominal pain gas and bloating fatigue and feeling she is in a daze and her equilibrium is off.  She was last seen in the office on 2024 and postop day #18 where Kelin BETTS PA-C noted she looked well.  She was down 24 pounds.  Full bariatric panel obtained and there was improvement in her prealbumin to 17, hemoglobin had increased to 11.8 from 9.63 weeks prior CMP was normal iron was normal MMA normal, other levels unremarkable.  She had called with some ongoing complaints and we obtained a CT scan performed on 2024.  This shows a 8.5 x 6 cm evolving liquefied hematoma.  I reviewed the images and also reviewed them with the patient.  She has some gas pains and occasional left subcostal pain.  She says she gets some nausea when she feels constipated but no vomiting and takes Zofran as needed.  She denies heartburn or difficulty swallowing but on questioning does get occasional substernal dysphagia.  She says she has constipation in the sense that her bowels do not move as often as she thinks they should, maybe every 2 to 3 days, it is not  "hard.  She is on a stool regimen.  She still has some fatigue and occasional dizziness.  She did return to work yesterday.  She is taking the vitamin patches.  She says she is not sure how much she is drinking but her urine is light yellow and she feels hydrated.  She is pleased with her weight loss.  She does get full easily with some postprandial epigastric pain.  She is currently getting in between 60 to 100 g of protein a day.  She is continuing her daily PPI and requested a refill sent to her pharmacy in [place].  She is avoiding ulcerogenic agents.  Medication list reviewed.  She is concerned about a spitting suture in her epigastric trocar site incision.  She denies any fever or chills.  She is also concerned about a knot and discomfort in her right lateral 12 mm trocar site incision.  She says she has been diligently avoiding corn syrup and that \"it is in everything\".  She says this is very different from the sleeve where she feels she lost about 60 pounds and then the weight loss just stopped.  She admits that she was not told to avoid corn syrup at that time.     Presurgery weight:  230 pounds. Today's weight is 92.6 kg (204 lb 3.2 oz) pounds, today's Body mass index is 32.96 kg/m²., and   weight loss since surgery is 26 pounds.       Past Medical History:   Diagnosis Date    Anxiety 2017    Back pain     chiropractor; PRN nsaids    Carpal tunnel syndrome, left     Chronic nausea     chronic since LSG    Diabetes mellitus     GERD (gastroesophageal reflux disease)     Omeprazole daily. EGD prior to LSG;    H. pylori infection     EGD Dr. Arce 3/24 - abundant h. pylori, PrevPak prescribed, re-test pending    H/O gastric sleeve     Dr. Bird 3/2021    Hiatal hernia     History of abnormal cervical Pap smear     History of MRSA infection     skin boil at buttock as child    History of postpartum hemorrhage     History of pre-eclampsia 2018    History of twin pregnancy in prior pregnancy     Hypertension "     Joint pain     Left lateral epicondylitis     cortisone injections and PT    Palpitations     with high stress    Polycystic ovarian disease     PONV (postoperative nausea and vomiting)     Postpartum spinal headache     Prediabetes     per external records; controlled post-sleeve    Tendonitis of elbow, left     Vitamin B deficiency     Wears eyeglasses      Past Surgical History:   Procedure Laterality Date     SECTION      lower transverse     SECTION      COLPOSCOPY W/ BIOPSY / CURETTAGE      negative, per patient    D & C WITH SUCTION      EAB    ENDOSCOPY  2024    with biopsies    ENDOSCOPY N/A 2024    Procedure: ESOPHAGOGASTRODUODENOSCOPY;  Surgeon: Rhett Arce MD;  Location:  [facility];  Service: General;  Laterality: N/A;  SCOPE     GASTRIC BYPASS N/A 2024    Procedure: OLIVER-EN-Y GASTRIC BYPASS LAPAROSCOPIC WITH DAVINCI ROBOT;  Surgeon: Rhett Arce MD;  Location:  [facility];  Service: Robotics - DaVinci;  Laterality: N/A;    GASTRIC SLEEVE LAPAROSCOPIC  2021    Dr. Bird    HIATAL HERNIA REPAIR N/A 2024    Procedure: LAPAROSCOPIC HIATAL HERNIA REPAIR WITH DAVINCI ROBOT;  Surgeon: Rhett Arce MD;  Location:  [facility];  Service: Robotics - DaVinci;  Laterality: N/A;  HIATAL HERNIA TIME:     LAPAROSCOPIC CHOLECYSTECTOMY      gallstones    WISDOM TOOTH EXTRACTION       Outpatient Medications Marked as Taking for the 24 encounter (Office Visit) with Rhett Arce MD   Medication Sig Dispense Refill    acetaminophen (TYLENOL) 500 MG tablet Take 1 tablet by mouth Every 6 (Six) Hours As Needed for Mild Pain.      cetirizine (zyrTEC) 10 MG tablet Take 1 tablet by mouth Daily.      cyclobenzaprine (FLEXERIL) 10 MG tablet Take 1 tablet by mouth Every 8 (Eight) Hours. 45 tablet 0    NON FORMULARY PatchAid: MV, D, B12, B1, Iron      omeprazole (priLOSEC) 40 MG capsule Take 1 capsule by mouth Daily.  "30 capsule 0    ondansetron ODT (ZOFRAN-ODT) 4 MG disintegrating tablet Place 1 tablet on the tongue Every 8 (Eight) Hours As Needed for Nausea or Vomiting.      thiamine (B-1) 100 MG/ML injection Inject 1 mL into the appropriate muscle as directed by prescriber Daily for 14 days. 14 mL 0    valACYclovir (VALTREX) 500 MG tablet Take 1 tablet by mouth As Needed (zoster). prn       Allergies   Allergen Reactions    Lisinopril Cough       Social History     Socioeconomic History    Marital status:    Tobacco Use    Smoking status: Never    Smokeless tobacco: Never   Vaping Use    Vaping status: Some Days   Substance and Sexual Activity    Alcohol use: Yes     Comment: once a month, social    Drug use: No    Sexual activity: Defer     Partners: Male     Birth control/protection: Vasectomy       /76 (BP Location: Left arm, Patient Position: Sitting)   Pulse 88   Temp 97.3 °F (36.3 °C)   Resp 16   Ht 167.6 cm (66\")   Wt 92.6 kg (204 lb 3.2 oz)   LMP 07/12/2024   SpO2 99%   BMI 32.96 kg/m²     Physical Exam  She looks well, pleasant conversant in no apparent distress.  Abdomen soft and benign.  Incisions of healed nicely.  Right lower quadrant 12 mm trocar site deep tissue appropriate and as expected on exam.  Tiny spitting suture 3-0 Monocryl plus pitting in the lateral left epigastric 5 mm trocar site incision removed.    Assessment:  1 month s/p revision previous sleeve gastrectomy (Dr. Bird 2021) for hiatal hernia and partial gastric out obstruction to Justino-en-Y gastric bypass and concomitant hiatal hernia repair.  Complicated by postoperative intraperitoneal hematoma receiving 1 unit of packed red blood cells index admission and readmitted postoperative day #5 for couple of days.  Preoperative reflux and partial gastric outlet obstruction symptoms remain resolved.  Slow recovery but overall doing better.  Feels better, tolerating her diet, has returned to work.  CT scan shows an 8.5 x 6 cm " evolving liquefied hematoma.  In discussion with the patient and she feels it is mildly symptomatic.        Plan: I offered her admission and CT percutaneous drainage.  I do think over time it will completely resolve but it may take several weeks to a few months.  Right now she would like to proceed with observation as overall she feels well.  I encouraged her to contact us if she changes her mind.  Continue to advance diet per manual.  Continue protein >100g/day.  Encouraged good food choices - high protein, low carb.  Continue routine exercise.  Recent bariatric labs reviewed as above.  Call w/ problems/concerns.    The patient was instructed to follow up in 2 months with a repeat CT scan and repeat bariatric labs at that time, sooner if needed.     Thank you Flory PHILLIP MERCEDES for the opportunity to participate in the care of Mrs. Kent.    Rhett Arce MD    Answers submitted by the patient for this visit:  Primary Reason for Visit (Submitted on 8/7/2024)  What is the primary reason for your visit?: Abdominal Pain  Abdominal Pain Questionnaire (Submitted on 8/7/2024)  Chief Complaint: Abdominal pain  Chronicity: new  Onset: in the past 7 days  Onset quality: undetermined  Frequency: 2 to 4 times per day  Progression since onset: coming and going  Pain location: LUQ  Pain - numeric: 3/10  Pain quality: aching, dull  Radiates to: LLQ, periumbilical region  anorexia: Yes  arthralgias: No  belching: Yes  constipation: Yes  diarrhea: No  dysuria: No  fever: No  flatus: Yes  frequency: No  hematochezia: No  hematuria: No  melena: No  myalgias: Yes  nausea: Yes  weight loss: Yes  vomiting: No  Aggravated by: certain positions, deep breathing, eating  Relieved by: bowel movements, palpation, passing flatus

## 2024-08-15 ENCOUNTER — TELEPHONE (OUTPATIENT)
Dept: BARIATRICS/WEIGHT MGMT | Facility: CLINIC | Age: 29
End: 2024-08-15
Payer: COMMERCIAL

## 2024-08-15 NOTE — TELEPHONE ENCOUNTER
----- Message from Rhett Arce sent at 7/19/2024  7:17 PM EDT -----    Please have her follow-up with her PCP regarding a pulmonary nodule seen on CT angiogram during admission, thanks!

## 2024-08-28 ENCOUNTER — TELEMEDICINE (OUTPATIENT)
Dept: BARIATRICS/WEIGHT MGMT | Facility: CLINIC | Age: 29
End: 2024-08-28
Payer: COMMERCIAL

## 2024-08-28 VITALS — BODY MASS INDEX: 32.78 KG/M2 | WEIGHT: 204 LBS | HEIGHT: 66 IN

## 2024-08-28 DIAGNOSIS — Z98.84 S/P BARIATRIC SURGERY: Primary | ICD-10-CM

## 2024-08-28 PROCEDURE — 99024 POSTOP FOLLOW-UP VISIT: CPT | Performed by: PHYSICIAN ASSISTANT

## 2024-08-28 NOTE — PROGRESS NOTES
"[facility] Bariatric Surgery  [address]  [address]  [facility] [id number] [phone]      Patient Name:  Annie Kent  :  1995      Date of Visit: 2024      Reason for Visit:  6 weeks postop       HPI:  Annie Kent is a 28 y.o. female s/p robotic RNY/BPD/HHR 24 GDW (for pGOO w/ chronic nausea), hx LSG 3/2021 w/ Dr. Bird [place] [place]    Surgery complicated by intraperitoneal bleed (s/p 1u PRBC  for Hgb 7.7), discharged on POD#2, readmitted on POD#5 w/ dehydration, discharged 2 days later.  CT scan 24 for c/o LUQ pain revealed 8.5 x 6 cm evolving liquefied hematoma.  LOV 24 w/ Dr. Arce, offered admission w/ CT percutaneous drainage, but patient opted for observation w/ repeat CT scan in 2 months.    Added on today b/c she was further contemplating CT percutaneous drainage.     Feeling much better overall.  Getting 80g prot/day.  Drinking a protein shake each AM.  Snacking on high protein foods through the day, while at work.  Eating high protein dinners, mostly meats, had pulled pork last PM.  Only has dysphagia if she eats too much too fast.  Only vomits if something gets stuck.  Had some nausea yesterday, not sure why, Zofran helped.  Denies reflux.  Continues on PPI.  c/o gas/bloating \"all the time\".  Having daily BMs but notes stools \"float\".  Having episodic LUQ pain, under her ribs, intense at times.  No fevers.      Wearing MVI, B12, iron, Kishore+D, biotin, probiotic vitamin patches.        PreRNY weight: 230 pounds.  Today's weight is 92.5 kg (204 lb) pounds, today's  Body mass index is 32.93 kg/m²., and weight loss since surgery is 26 pounds.       Past Medical History:   Diagnosis Date    Anxiety 2017    Back pain     chiropractor; PRN nsaids    Carpal tunnel syndrome, left     Chronic nausea     chronic since LSG    Diabetes mellitus     GERD (gastroesophageal reflux disease)     Omeprazole daily. EGD prior to LSG;    H. pylori infection     EGD Dr." Yazmin 3/24 - abundant h. pylori, PrevPak prescribed, re-test pending    H/O gastric sleeve     Dr. Bird 3/2021    Hiatal hernia     History of abnormal cervical Pap smear     History of MRSA infection     skin boil at buttock as child    History of postpartum hemorrhage     History of pre-eclampsia 2018    History of twin pregnancy in prior pregnancy     Hypertension     Joint pain     Left lateral epicondylitis     cortisone injections and PT    Palpitations     with high stress    Polycystic ovarian disease     PONV (postoperative nausea and vomiting)     Postpartum spinal headache     Prediabetes     per external records; controlled post-sleeve    Tendonitis of elbow, left     Vitamin B deficiency     Wears eyeglasses      Past Surgical History:   Procedure Laterality Date     SECTION      lower transverse     SECTION      COLPOSCOPY W/ BIOPSY / CURETTAGE      negative, per patient    D & C WITH SUCTION      EAB    ENDOSCOPY  2024    with biopsies    ENDOSCOPY N/A 2024    Procedure: ESOPHAGOGASTRODUODENOSCOPY;  Surgeon: Rhett Arce MD;  Location:  [facility];  Service: General;  Laterality: N/A;  SCOPE     GASTRIC BYPASS N/A 2024    Procedure: OLIVER-EN-Y GASTRIC BYPASS LAPAROSCOPIC WITH DAVINCI ROBOT;  Surgeon: Rhett Arce MD;  Location:  [facility];  Service: Robotics - DaVinci;  Laterality: N/A;    GASTRIC SLEEVE LAPAROSCOPIC  2021    Dr. Bird    HIATAL HERNIA REPAIR N/A 2024    Procedure: LAPAROSCOPIC HIATAL HERNIA REPAIR WITH DAVINCI ROBOT;  Surgeon: Rhett Arce MD;  Location:  [facility];  Service: Robotics - DaVinci;  Laterality: N/A;  HIATAL HERNIA TIME:     LAPAROSCOPIC CHOLECYSTECTOMY      gallstones    WISDOM TOOTH EXTRACTION       Outpatient Medications Marked as Taking for the 24 encounter (Telemedicine) with Kelin Spangler PA   Medication Sig Dispense Refill    acetaminophen (TYLENOL) 500  "MG tablet Take 1 tablet by mouth Every 6 (Six) Hours As Needed for Mild Pain.      cetirizine (zyrTEC) 10 MG tablet Take 1 tablet by mouth Daily.      NON FORMULARY PatchAid: MV, D, B12, B1, Iron      omeprazole (priLOSEC) 40 MG capsule Take 1 capsule by mouth Daily. 30 capsule 0    ondansetron ODT (ZOFRAN-ODT) 4 MG disintegrating tablet Place 1 tablet on the tongue Every 8 (Eight) Hours As Needed for Nausea or Vomiting.       Allergies   Allergen Reactions    Lisinopril Cough       Social History     Socioeconomic History    Marital status:    Tobacco Use    Smoking status: Never    Smokeless tobacco: Never   Vaping Use    Vaping status: Some Days   Substance and Sexual Activity    Alcohol use: Yes     Comment: once a month, social    Drug use: No    Sexual activity: Defer     Partners: Male     Birth control/protection: Vasectomy     Social History     Social History Narrative    Lives in [place], [place].   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       Ht 167.6 cm (66\")   Wt 92.5 kg (204 lb)   BMI 32.93 kg/m²     Physical Exam  Constitutional:       General: She is not in acute distress.     Appearance: She is well-developed.   Eyes:      General: No scleral icterus.  Pulmonary:      Effort: Pulmonary effort is normal.   Neurological:      Mental Status: She is alert and oriented to person, place, and time.           Assessment:   6 weeks s/p robotic RNY/HHR 7/12/24 GDW (for pGOO w/ chronic nausea), hx LSG 3/2021 w/ Dr. Bird @[facility] [place]      Plan:  Discussed options - admission w/ CT percutaneous drainage of LUQ hematoma vs continued observation w/ repeat imaging in 6 weeks.  She declines additional intervention at this time.  Continue to focus on high protein w/ goal 100+g/day.  Continue PPI.  Continue vitamins.  Continue to avoid ASA/NSAIDS/steroids/tobacco indefinitely.  Call w/ problems/concerns.    The patient was instructed to follow up in 6 weeks, sooner if needed. "     Note: This was an audio and video enabled telemedicine encounter conducted via [employer].  Patient is located in [place].  Provider is at the office. Consent was obtained prior to the visit.

## 2024-09-19 ENCOUNTER — PATIENT MESSAGE (OUTPATIENT)
Dept: BARIATRICS/WEIGHT MGMT | Facility: CLINIC | Age: 29
End: 2024-09-19
Payer: COMMERCIAL

## 2024-09-24 ENCOUNTER — OFFICE VISIT (OUTPATIENT)
Dept: BARIATRICS/WEIGHT MGMT | Facility: CLINIC | Age: 29
End: 2024-09-24
Payer: COMMERCIAL

## 2024-09-24 VITALS
RESPIRATION RATE: 16 BRPM | WEIGHT: 190.6 LBS | BODY MASS INDEX: 30.63 KG/M2 | OXYGEN SATURATION: 99 % | HEART RATE: 72 BPM | DIASTOLIC BLOOD PRESSURE: 76 MMHG | TEMPERATURE: 97.1 F | HEIGHT: 66 IN | SYSTOLIC BLOOD PRESSURE: 114 MMHG

## 2024-09-24 DIAGNOSIS — R10.12 LUQ PAIN: Primary | ICD-10-CM

## 2024-09-24 DIAGNOSIS — K66.1 INTRAPERITONEAL HEMATOMA: ICD-10-CM

## 2024-09-24 DIAGNOSIS — R79.0 ABNORMAL BLOOD LEVEL OF IRON: ICD-10-CM

## 2024-09-24 DIAGNOSIS — K90.9 INTESTINAL MALABSORPTION, UNSPECIFIED TYPE: ICD-10-CM

## 2024-09-24 DIAGNOSIS — D64.9 ANEMIA, UNSPECIFIED TYPE: ICD-10-CM

## 2024-09-24 DIAGNOSIS — Z13.21 ENCOUNTER FOR VITAMIN DEFICIENCY SCREENING: ICD-10-CM

## 2024-09-24 DIAGNOSIS — R53.83 FATIGUE, UNSPECIFIED TYPE: ICD-10-CM

## 2024-09-24 DIAGNOSIS — E55.9 VITAMIN D DEFICIENCY: ICD-10-CM

## 2024-09-24 PROCEDURE — 99024 POSTOP FOLLOW-UP VISIT: CPT | Performed by: PHYSICIAN ASSISTANT

## 2024-09-24 RX ORDER — OMEPRAZOLE 40 MG/1
40 CAPSULE, DELAYED RELEASE ORAL DAILY
Qty: 90 CAPSULE | Refills: 0 | Status: SHIPPED | OUTPATIENT
Start: 2024-09-24

## 2024-09-24 RX ORDER — ONDANSETRON 4 MG/1
4 TABLET, ORALLY DISINTEGRATING ORAL EVERY 8 HOURS PRN
Qty: 30 TABLET | Refills: 0 | Status: SHIPPED | OUTPATIENT
Start: 2024-09-24

## 2024-09-24 RX ORDER — CYANOCOBALAMIN 1000 UG/ML
1000 INJECTION, SOLUTION INTRAMUSCULAR; SUBCUTANEOUS
COMMUNITY
Start: 2024-09-13

## 2024-10-03 LAB
25(OH)D3+25(OH)D2 SERPL-MCNC: 40.7 NG/ML (ref 30–100)
A-TOCOPHEROL VIT E SERPL-MCNC: 7.4 MG/L (ref 5.9–19.4)
ALBUMIN SERPL-MCNC: 4.6 G/DL (ref 4–5)
ALP SERPL-CCNC: 82 IU/L (ref 44–121)
ALT SERPL-CCNC: 13 IU/L (ref 0–32)
AST SERPL-CCNC: 16 IU/L (ref 0–40)
BASOPHILS # BLD AUTO: 0 X10E3/UL (ref 0–0.2)
BASOPHILS NFR BLD AUTO: 1 %
BILIRUB SERPL-MCNC: 0.3 MG/DL (ref 0–1.2)
BUN SERPL-MCNC: 10 MG/DL (ref 6–20)
BUN/CREAT SERPL: 17 (ref 9–23)
CALCIUM SERPL-MCNC: 9.6 MG/DL (ref 8.7–10.2)
CHLORIDE SERPL-SCNC: 102 MMOL/L (ref 96–106)
CO2 SERPL-SCNC: 22 MMOL/L (ref 20–29)
COPPER SERPL-MCNC: 71 UG/DL (ref 80–158)
CREAT SERPL-MCNC: 0.59 MG/DL (ref 0.57–1)
EGFRCR SERPLBLD CKD-EPI 2021: 124 ML/MIN/1.73
EOSINOPHIL # BLD AUTO: 0.2 X10E3/UL (ref 0–0.4)
EOSINOPHIL NFR BLD AUTO: 2 %
ERYTHROCYTE [DISTWIDTH] IN BLOOD BY AUTOMATED COUNT: 13 % (ref 11.7–15.4)
FERRITIN SERPL-MCNC: 82 NG/ML (ref 15–150)
FOLATE SERPL-MCNC: 6 NG/ML
GAMMA TOCOPHEROL SERPL-MCNC: 0.8 MG/L (ref 0.7–4.9)
GLOBULIN SER CALC-MCNC: 2.8 G/DL (ref 1.5–4.5)
GLUCOSE SERPL-MCNC: 87 MG/DL (ref 70–99)
HCT VFR BLD AUTO: 44.6 % (ref 34–46.6)
HGB BLD-MCNC: 14.3 G/DL (ref 11.1–15.9)
IMM GRANULOCYTES # BLD AUTO: 0 X10E3/UL (ref 0–0.1)
IMM GRANULOCYTES NFR BLD AUTO: 0 %
INR PPP: 1.1 (ref 0.9–1.2)
IRON SERPL-MCNC: 69 UG/DL (ref 27–159)
LYMPHOCYTES # BLD AUTO: 1.4 X10E3/UL (ref 0.7–3.1)
LYMPHOCYTES NFR BLD AUTO: 17 %
MAGNESIUM SERPL-MCNC: 2 MG/DL (ref 1.6–2.3)
MCH RBC QN AUTO: 27.9 PG (ref 26.6–33)
MCHC RBC AUTO-ENTMCNC: 32.1 G/DL (ref 31.5–35.7)
MCV RBC AUTO: 87 FL (ref 79–97)
METHYLMALONATE SERPL-SCNC: 113 NMOL/L (ref 0–378)
MONOCYTES # BLD AUTO: 0.5 X10E3/UL (ref 0.1–0.9)
MONOCYTES NFR BLD AUTO: 6 %
NEUTROPHILS # BLD AUTO: 6.2 X10E3/UL (ref 1.4–7)
NEUTROPHILS NFR BLD AUTO: 74 %
PHOSPHATE SERPL-MCNC: 3.7 MG/DL (ref 3–4.3)
PHYTONADIONE SERPL-MCNC: 0.14 NG/ML (ref 0.1–2.2)
PLATELET # BLD AUTO: 260 X10E3/UL (ref 150–450)
POTASSIUM SERPL-SCNC: 4.2 MMOL/L (ref 3.5–5.2)
PREALB SERPL-MCNC: 20 MG/DL (ref 14–35)
PROT SERPL-MCNC: 7.4 G/DL (ref 6–8.5)
PROTHROMBIN TIME: 11.6 SEC (ref 9.1–12)
PTH-INTACT SERPL-MCNC: 23 PG/ML (ref 15–65)
RBC # BLD AUTO: 5.12 X10E6/UL (ref 3.77–5.28)
SODIUM SERPL-SCNC: 140 MMOL/L (ref 134–144)
VIT A SERPL-MCNC: 32.1 UG/DL (ref 18.9–57.3)
VIT B1 BLD-SCNC: 98.5 NMOL/L (ref 66.5–200)
WBC # BLD AUTO: 8.2 X10E3/UL (ref 3.4–10.8)
ZINC SERPL-MCNC: 70 UG/DL (ref 44–115)

## 2024-10-04 ENCOUNTER — HOSPITAL ENCOUNTER (OUTPATIENT)
Facility: HOSPITAL | Age: 29
Discharge: HOME | End: 2024-10-04
Admitting: PHYSICIAN ASSISTANT
Payer: COMMERCIAL

## 2024-10-04 DIAGNOSIS — R10.12 LUQ PAIN: ICD-10-CM

## 2024-10-04 DIAGNOSIS — K66.1 INTRAPERITONEAL HEMATOMA: ICD-10-CM

## 2024-10-04 PROCEDURE — 25510000001 IOPAMIDOL 61 % SOLUTION: Performed by: PHYSICIAN ASSISTANT

## 2024-10-04 PROCEDURE — 74177 CT ABD & PELVIS W/CONTRAST: CPT

## 2024-10-04 RX ORDER — IOPAMIDOL 612 MG/ML
100 INJECTION, SOLUTION INTRAVASCULAR
Status: COMPLETED | OUTPATIENT
Start: 2024-10-04 | End: 2024-10-04

## 2024-10-04 RX ADMIN — IOPAMIDOL 100 ML: 612 INJECTION, SOLUTION INTRAVENOUS at 15:43

## 2024-10-07 ENCOUNTER — TELEPHONE (OUTPATIENT)
Dept: BARIATRICS/WEIGHT MGMT | Facility: CLINIC | Age: 29
End: 2024-10-07

## 2024-10-07 DIAGNOSIS — R11.0 NAUSEA: Primary | ICD-10-CM

## 2024-10-07 DIAGNOSIS — R10.13 DYSPEPSIA: ICD-10-CM

## 2024-10-07 NOTE — TELEPHONE ENCOUNTER
Called pt advised that her CT looks good. Advised that the hematoma is much smaller, advised that it is now just 4.4cm. Advised pt that Dr Arce does not feel that this could be causing any of her symptoms. Advised that the risk of draining the hematoma would outweigh any benefit at this time. Advised that he would recommend a repeat UGI given her symptoms. Pt would like to get this scheduled as soon as possible since she is currently on fall break. Advised pt she is more than welcome to seek a 2nd opinion if she desires. Pt understood with no further questions or concerns.

## 2024-10-07 NOTE — TELEPHONE ENCOUNTER
"----- Message from Rhett Arce sent at 10/7/2024  7:49 AM EDT -----    I looked at the report and images.  I would let her know it looks much improved.  Currently in largest dimension it is 4.4 cm which equals 1.7 inches.  It should not be causing symptoms and at this point with it resolving/shrinking over time, I think the risk of aspiration outweighs any benefit and may not lead to any clinical improvement in her occasional symptoms.  She is more than welcome to seek second opinion(s).  Since she did develop intense chest pressure and nausea suggesting a possible recurrent hiatal hernia, we could repeat her barium upper GI if she is interested.  Thanks!    ----- Message -----  From: Kelin Spangler PA  Sent: 10/7/2024  12:52 AM EDT  To: Rhett Arce MD    For your review - almost 3 months s/p robotic RNY/BPD/HHR 7/12/24 GDW (for pGOO w/ chronic nausea), hx LSG 3/2021 w/ Dr. Bird [place] [place].     Surgery complicated by intraperitoneal bleed (s/p 1u PRBC 7/14 for Hgb 7.7), discharged on POD#2, readmitted on POD#5 w/ dehydration, discharged 2 days later.  CT scan 8/8/24 for c/o LUQ pain revealed 8.5 x 6 cm evolving liquefied hematoma.      Still c/o episodic LUQ pain.     LOV 9/24/24 - Last week had congestion, was on Sudafed + Zpak, vomited and developed (L) lower abd.pain, improved after a couple days, feels like she may have just pulled a muscle.       3 days ago developed intense chest pressure w/ nausea after taking a bite of CFA grilled chicken, thinks maybe she didn't chew well enough, Zofran helped.  Says sometimes just doesn't tolerate meats well, but otherwise tolerating soft/liquid diet w/out issue.  Getting 60-100g prot/day.  Continues on daily PPI.  Having daily stools each AM.           LUQ hematoma pain seems to come and go, travels into her (L) shoulder when she lays down, has LUQ tenderness to palpation - \"dull ache\" now.  Denies fevers/chills.       Denies ASA/NSAIDS/steroids/tobacco/nicotine. " "  vapes.       Bariatric labs 9/24/24 - low copper, o/w okay.       Wearing MVI, B12, iron, Kishore+D, biotin, probiotic vitamin patches + monthly B12 injections.      Repeat CT ab/pel w/ IV/PO (barium based) 10/4/24 @[facility] - \"Well-defined encapsulated low-attenuation flexion adjacent to the stomach in the left upper quadrant measures approximately 4.4 x 4.1 cm transversely. This is decreased in size from the comparison. Additional smaller components noted on prior study have also decreased in size. Minimal hazy stranding is noted within the upper abdomen. Postsurgical changes of prior gastric bypass are noted. There is decompression of the bypassed portions of the stomach. The duodenal and proximal small bowel   grossly unremarkable. The anastomotic site is grossly unremarkable in appearance. There is no evidence of extravasation of oral contrast. There is no evidence of obstruction. The small bowel demonstrates no acute abnormality. Lymph nodes within the mesentery are likely reactive. The appendix is visualized and appears within normal limits. There is a moderate amount of stool noted within the colon without acute inflammatory changes appreciated of the colon.\"      ---- Let me know what you would recommend next?  Thanks!    "

## 2024-10-21 ENCOUNTER — OFFICE VISIT (OUTPATIENT)
Dept: BARIATRICS/WEIGHT MGMT | Facility: CLINIC | Age: 29
End: 2024-10-21
Payer: COMMERCIAL

## 2024-10-21 VITALS
SYSTOLIC BLOOD PRESSURE: 126 MMHG | HEIGHT: 66 IN | TEMPERATURE: 97.8 F | DIASTOLIC BLOOD PRESSURE: 62 MMHG | WEIGHT: 185.2 LBS | HEART RATE: 64 BPM | BODY MASS INDEX: 29.77 KG/M2

## 2024-10-21 DIAGNOSIS — K91.2 POSTGASTRECTOMY MALABSORPTION: ICD-10-CM

## 2024-10-21 DIAGNOSIS — K29.60 BILE REFLUX GASTRITIS: ICD-10-CM

## 2024-10-21 DIAGNOSIS — R53.83 FATIGUE, UNSPECIFIED TYPE: ICD-10-CM

## 2024-10-21 DIAGNOSIS — E61.0 COPPER DEFICIENCY: ICD-10-CM

## 2024-10-21 DIAGNOSIS — Z90.3 POSTGASTRECTOMY MALABSORPTION: ICD-10-CM

## 2024-10-21 DIAGNOSIS — Z51.81 THERAPEUTIC DRUG MONITORING: ICD-10-CM

## 2024-10-21 DIAGNOSIS — E55.9 HYPOVITAMINOSIS D: ICD-10-CM

## 2024-10-21 DIAGNOSIS — E66.3 OVERWEIGHT (BMI 25.0-29.9): Primary | ICD-10-CM

## 2024-10-21 DIAGNOSIS — Z13.0 SCREENING, IRON DEFICIENCY ANEMIA: ICD-10-CM

## 2024-10-21 DIAGNOSIS — Z13.21 MALNUTRITION SCREEN: ICD-10-CM

## 2024-10-21 PROCEDURE — 99214 OFFICE O/P EST MOD 30 MIN: CPT | Performed by: SURGERY

## 2024-10-21 RX ORDER — CALCIUM CARBONATE/VITAMIN D3 500-10/5ML
2 LIQUID (ML) ORAL DAILY
COMMUNITY
Start: 2024-10-19

## 2024-10-28 LAB
25(OH)D3+25(OH)D2 SERPL-MCNC: 39.9 NG/ML (ref 30–100)
A-TOCOPHEROL VIT E SERPL-MCNC: 7.5 MG/L (ref 5.9–19.4)
ALBUMIN SERPL-MCNC: 4.3 G/DL (ref 4–5)
ALP SERPL-CCNC: 76 IU/L (ref 44–121)
ALT SERPL-CCNC: 13 IU/L (ref 0–32)
AST SERPL-CCNC: 13 IU/L (ref 0–40)
BASOPHILS # BLD AUTO: 0 X10E3/UL (ref 0–0.2)
BASOPHILS NFR BLD AUTO: 1 %
BILIRUB SERPL-MCNC: 0.3 MG/DL (ref 0–1.2)
BUN SERPL-MCNC: 8 MG/DL (ref 6–20)
BUN/CREAT SERPL: 12 (ref 9–23)
CALCIUM SERPL-MCNC: 9.2 MG/DL (ref 8.7–10.2)
CHLORIDE SERPL-SCNC: 104 MMOL/L (ref 96–106)
CO2 SERPL-SCNC: 24 MMOL/L (ref 20–29)
COPPER SERPL-MCNC: 72 UG/DL (ref 80–158)
CREAT SERPL-MCNC: 0.65 MG/DL (ref 0.57–1)
EGFRCR SERPLBLD CKD-EPI 2021: 121 ML/MIN/1.73
EOSINOPHIL # BLD AUTO: 0.3 X10E3/UL (ref 0–0.4)
EOSINOPHIL NFR BLD AUTO: 3 %
ERYTHROCYTE [DISTWIDTH] IN BLOOD BY AUTOMATED COUNT: 12.9 % (ref 11.7–15.4)
FERRITIN SERPL-MCNC: 116 NG/ML (ref 15–150)
FOLATE SERPL-MCNC: 6.3 NG/ML
GAMMA TOCOPHEROL SERPL-MCNC: 0.7 MG/L (ref 0.7–4.9)
GLOBULIN SER CALC-MCNC: 2.5 G/DL (ref 1.5–4.5)
GLUCOSE SERPL-MCNC: 82 MG/DL (ref 70–99)
HCT VFR BLD AUTO: 43.6 % (ref 34–46.6)
HGB BLD-MCNC: 13.8 G/DL (ref 11.1–15.9)
IMM GRANULOCYTES # BLD AUTO: 0 X10E3/UL (ref 0–0.1)
IMM GRANULOCYTES NFR BLD AUTO: 0 %
IRON SERPL-MCNC: 74 UG/DL (ref 27–159)
LYMPHOCYTES # BLD AUTO: 1.6 X10E3/UL (ref 0.7–3.1)
LYMPHOCYTES NFR BLD AUTO: 20 %
MAGNESIUM SERPL-MCNC: 1.9 MG/DL (ref 1.6–2.3)
MCH RBC QN AUTO: 27.2 PG (ref 26.6–33)
MCHC RBC AUTO-ENTMCNC: 31.7 G/DL (ref 31.5–35.7)
MCV RBC AUTO: 86 FL (ref 79–97)
METHYLMALONATE SERPL-SCNC: 118 NMOL/L (ref 0–378)
MONOCYTES # BLD AUTO: 0.6 X10E3/UL (ref 0.1–0.9)
MONOCYTES NFR BLD AUTO: 7 %
NEUTROPHILS # BLD AUTO: 5.4 X10E3/UL (ref 1.4–7)
NEUTROPHILS NFR BLD AUTO: 69 %
PHOSPHATE SERPL-MCNC: 4.2 MG/DL (ref 3–4.3)
PHYTONADIONE SERPL-MCNC: 0.16 NG/ML (ref 0.1–2.2)
PLATELET # BLD AUTO: 288 X10E3/UL (ref 150–450)
POTASSIUM SERPL-SCNC: 4.3 MMOL/L (ref 3.5–5.2)
PREALB SERPL-MCNC: 20 MG/DL (ref 14–35)
PROT SERPL-MCNC: 6.8 G/DL (ref 6–8.5)
PTH-INTACT SERPL-MCNC: 29 PG/ML (ref 15–65)
RBC # BLD AUTO: 5.07 X10E6/UL (ref 3.77–5.28)
SODIUM SERPL-SCNC: 140 MMOL/L (ref 134–144)
VIT A SERPL-MCNC: 29.1 UG/DL (ref 18.9–57.3)
VIT B1 BLD-SCNC: 96.1 NMOL/L (ref 66.5–200)
WBC # BLD AUTO: 7.9 X10E3/UL (ref 3.4–10.8)
ZINC SERPL-MCNC: 87 UG/DL (ref 44–115)

## 2025-01-31 ENCOUNTER — OFFICE VISIT (OUTPATIENT)
Dept: BARIATRICS/WEIGHT MGMT | Facility: CLINIC | Age: 30
End: 2025-01-31
Payer: COMMERCIAL

## 2025-01-31 VITALS
OXYGEN SATURATION: 100 % | WEIGHT: 164.8 LBS | HEART RATE: 98 BPM | BODY MASS INDEX: 26.48 KG/M2 | TEMPERATURE: 98.7 F | RESPIRATION RATE: 18 BRPM | DIASTOLIC BLOOD PRESSURE: 80 MMHG | HEIGHT: 66 IN | SYSTOLIC BLOOD PRESSURE: 132 MMHG

## 2025-01-31 DIAGNOSIS — K90.9 INTESTINAL MALABSORPTION, UNSPECIFIED TYPE: ICD-10-CM

## 2025-01-31 DIAGNOSIS — E61.0 COPPER DEFICIENCY: ICD-10-CM

## 2025-01-31 DIAGNOSIS — R79.0 ABNORMAL BLOOD LEVEL OF IRON: ICD-10-CM

## 2025-01-31 DIAGNOSIS — E55.9 VITAMIN D DEFICIENCY: ICD-10-CM

## 2025-01-31 DIAGNOSIS — R53.83 FATIGUE, UNSPECIFIED TYPE: ICD-10-CM

## 2025-01-31 DIAGNOSIS — E66.3 OVERWEIGHT (BMI 25.0-29.9): Primary | ICD-10-CM

## 2025-01-31 NOTE — PROGRESS NOTES
[facility] Bariatric Surgery  [address]  [address]  [facility] [id number] [phone]        Patient Name:  Annie Kent.  :  1995      Date of Visit: 2025      Reason for Visit:   6 months postop     HPI: Annie Kent is a 29 y.o. female s/p robotic RNY/BPD/HHR 24 GDW (for pGOO w/ chronic nausea), hx LSG 3/2021 w/ Dr. Bird [facility].     Surgery complicated by intraperitoneal bleed (s/p 1u PRBC  for Hgb 7.7), discharged on POD#2, readmitted on POD#5 w/ dehydration, discharged 2 days later.  CT scan 24 for c/o LUQ pain revealed 8.5 x 6 cm evolving liquefied hematoma.      ________      Doing well.  Denies dysphagia, reflux, and abdominal pain.  Having occasional nausea w/ certain foods, or eating too fast. 1-2x/wk. Zofran helps. Worse with lactose, especially milk- will sometimes need to vomit to help. Alternating diarrhea, constipation. Getting 60-100g prot/day.  Eats small frequent meals. Drinking 64 fluid oz/day.  On Omeprazole  only as needed.  Physical activity: active at work.    Labs 10/21/24- low copper. Taking Patches: (4) plus PO vit K, copper.         Presurgery weight: 230 pounds.  Today's weight is 74.8 kg (164 lb 12.8 oz) pounds, today's  Body mass index is 26.6 kg/m²., and weight loss since surgery is 66 pounds.      Past Medical History:   Diagnosis Date    Anxiety 2017    Back pain     chiropractor; PRN nsaids    Carpal tunnel syndrome, left     Chronic nausea     chronic since LSG    Diabetes mellitus     GERD (gastroesophageal reflux disease)     Omeprazole daily. EGD prior to LSG;    H. pylori infection     EGD Dr. Arce 3/24 - abundant h. pylori, PrevPak prescribed, re-test pending    H/O gastric sleeve     Dr. Bird 3/2021    Hiatal hernia     History of abnormal cervical Pap smear     History of MRSA infection     skin boil at buttock as child    History of postpartum hemorrhage     History of pre-eclampsia 2018    History of twin  pregnancy in prior pregnancy     Hypertension     Joint pain     Left lateral epicondylitis     cortisone injections and PT    Palpitations     with high stress    Polycystic ovarian disease     PONV (postoperative nausea and vomiting)     Postpartum spinal headache     Prediabetes     per external records; controlled post-sleeve    Tendonitis of elbow, left     Vitamin B deficiency     Wears eyeglasses      Past Surgical History:   Procedure Laterality Date     SECTION      lower transverse     SECTION      COLPOSCOPY W/ BIOPSY / CURETTAGE      negative, per patient    D & C WITH SUCTION      EAB    ENDOSCOPY  2024    with biopsies    ENDOSCOPY N/A 2024    Procedure: ESOPHAGOGASTRODUODENOSCOPY;  Surgeon: Rhett Arce MD;  Location:  [facility];  Service: General;  Laterality: N/A;  SCOPE     GASTRIC BYPASS N/A 2024    Procedure: OLIVER-EN-Y GASTRIC BYPASS LAPAROSCOPIC WITH DAVINCI ROBOT;  Surgeon: Rhett Arce MD;  Location:  [facility];  Service: Robotics - DaVinci;  Laterality: N/A;    GASTRIC SLEEVE LAPAROSCOPIC  2021    Dr. Bird    HIATAL HERNIA REPAIR N/A 2024    Procedure: LAPAROSCOPIC HIATAL HERNIA REPAIR WITH DAVINCI ROBOT;  Surgeon: Rhett Arce MD;  Location:  [facility];  Service: Robotics - DaVinci;  Laterality: N/A;  HIATAL HERNIA TIME:     LAPAROSCOPIC CHOLECYSTECTOMY      gallstones    WISDOM TOOTH EXTRACTION       Outpatient Medications Marked as Taking for the 25 encounter (Office Visit) with Iva Valdez APRN   Medication Sig Dispense Refill    acetaminophen (TYLENOL) 500 MG tablet Take 1 tablet by mouth Every 6 (Six) Hours As Needed for Mild Pain.      cetirizine (zyrTEC) 10 MG tablet Take 1 tablet by mouth Daily.      Copper Gluconate (Copper Caps) 2 MG capsule Take 2 mg by mouth Daily.      cyanocobalamin 1000 MCG/ML injection Inject 1 mL into the appropriate muscle as directed by  "prescriber Every 30 (Thirty) Days.      NON FORMULARY PatchAid: MV, D, B12, B1, Iron      omeprazole (priLOSEC) 40 MG capsule Take 1 capsule by mouth Daily. 90 capsule 0    ondansetron ODT (ZOFRAN-ODT) 4 MG disintegrating tablet Place 1 tablet on the tongue Every 8 (Eight) Hours As Needed for Nausea or Vomiting. 30 tablet 0    valACYclovir (VALTREX) 500 MG tablet Take 1 tablet by mouth As Needed (zoster). prn      vitamin k 100 MCG tablet Take 1 tablet by mouth Daily.         Allergies   Allergen Reactions    Lisinopril Cough       Social History     Socioeconomic History    Marital status:    Tobacco Use    Smoking status: Never    Smokeless tobacco: Never   Vaping Use    Vaping status: Never Used   Substance and Sexual Activity    Alcohol use: Yes     Comment: once a month, social    Drug use: No    Sexual activity: Defer     Partners: Male     Birth control/protection: Vasectomy       /80 (BP Location: Left arm, Patient Position: Sitting, Cuff Size: Adult)   Pulse 98   Temp 98.7 °F (37.1 °C) (Temporal)   Resp 18   Ht 167.6 cm (66\")   Wt 74.8 kg (164 lb 12.8 oz)   SpO2 100%   BMI 26.60 kg/m²     Physical Exam  Constitutional:       General: She is not in acute distress.     Appearance: She is well-developed. She is not diaphoretic.   HENT:      Head: Normocephalic and atraumatic.      Mouth/Throat:      Pharynx: No oropharyngeal exudate.   Eyes:      Conjunctiva/sclera: Conjunctivae normal.      Pupils: Pupils are equal, round, and reactive to light.   Pulmonary:      Effort: Pulmonary effort is normal. No respiratory distress.   Abdominal:      General: There is no distension.      Palpations: Abdomen is soft.   Skin:     General: Skin is warm and dry.      Coloration: Skin is not pale.   Neurological:      Mental Status: She is alert and oriented to person, place, and time.      Cranial Nerves: No cranial nerve deficit.   Psychiatric:         Behavior: Behavior normal.         Thought " Content: Thought content normal.         Assessment:  6 months s/p robotic RNY/BPD/HHR 7/12/24 GDW (for pGOO w/ chronic nausea), hx LSG 3/2021 w/ Dr. Bird [facility] [place].        ICD-10-CM ICD-9-CM   1. Overweight (BMI 25.0-29.9)  E66.3 278.02   2. Intestinal malabsorption, unspecified type  K90.9 579.9   3. Fatigue, unspecified type  R53.83 780.79   4. Copper deficiency  E61.0 275.1   5. Abnormal blood level of iron  R79.0 790.6   6. Vitamin D deficiency  E55.9 268.9         Plan:  Doing well. Continue w/ good food choices and healthy habits.  Continue protein >70g/day.  Increase routine exercise as able.  Routine bariatric labs ordered.  Continue vitamins w/ adjustments pending lab results.  Avoid ulcerogenics for life. Call w/ problems/concerns.       The patient was instructed to follow up in 3 months, sooner if needed.    I spent 30 minutes caring for Annie on this date of service. This time includes time spent by me in the following activities: preparing for the visit, reviewing tests, obtaining and/or reviewing a separately obtained history, performing a medically appropriate examination and/or evaluation, counseling and educating the patient/family/caregiver, ordering medications, tests, or procedures, and documenting information in the medical record.         Iva Valdez, APRN

## 2025-02-10 LAB
25(OH)D3+25(OH)D2 SERPL-MCNC: 32.9 NG/ML (ref 30–100)
A-TOCOPHEROL VIT E SERPL-MCNC: 6.5 MG/L (ref 5.9–19.4)
ALBUMIN SERPL-MCNC: 4.3 G/DL (ref 3.9–4.9)
ALP SERPL-CCNC: 75 IU/L (ref 44–121)
ALT SERPL-CCNC: 13 IU/L (ref 0–32)
AST SERPL-CCNC: 17 IU/L (ref 0–40)
BASOPHILS # BLD AUTO: 0.1 X10E3/UL (ref 0–0.2)
BASOPHILS NFR BLD AUTO: 1 %
BILIRUB SERPL-MCNC: 0.4 MG/DL (ref 0–1.2)
BUN SERPL-MCNC: 10 MG/DL (ref 6–20)
BUN/CREAT SERPL: 18 (ref 9–23)
CALCIUM SERPL-MCNC: 9.3 MG/DL (ref 8.7–10.2)
CHLORIDE SERPL-SCNC: 105 MMOL/L (ref 96–106)
CO2 SERPL-SCNC: 23 MMOL/L (ref 20–29)
COPPER SERPL-MCNC: 64 UG/DL (ref 80–158)
CREAT SERPL-MCNC: 0.56 MG/DL (ref 0.57–1)
EGFRCR SERPLBLD CKD-EPI 2021: 125 ML/MIN/1.73
EOSINOPHIL # BLD AUTO: 0.2 X10E3/UL (ref 0–0.4)
EOSINOPHIL NFR BLD AUTO: 3 %
ERYTHROCYTE [DISTWIDTH] IN BLOOD BY AUTOMATED COUNT: 12.8 % (ref 11.7–15.4)
FERRITIN SERPL-MCNC: 121 NG/ML (ref 15–150)
FOLATE SERPL-MCNC: 6.1 NG/ML
GAMMA TOCOPHEROL SERPL-MCNC: 0.6 MG/L (ref 0.7–4.9)
GLOBULIN SER CALC-MCNC: 2.5 G/DL (ref 1.5–4.5)
GLUCOSE SERPL-MCNC: 84 MG/DL (ref 70–99)
HCT VFR BLD AUTO: 38.1 % (ref 34–46.6)
HGB BLD-MCNC: 12.5 G/DL (ref 11.1–15.9)
IMM GRANULOCYTES # BLD AUTO: 0 X10E3/UL (ref 0–0.1)
IMM GRANULOCYTES NFR BLD AUTO: 0 %
INR PPP: 1.1 (ref 0.9–1.2)
IRON SERPL-MCNC: 108 UG/DL (ref 27–159)
LYMPHOCYTES # BLD AUTO: 1.7 X10E3/UL (ref 0.7–3.1)
LYMPHOCYTES NFR BLD AUTO: 25 %
MAGNESIUM SERPL-MCNC: 1.9 MG/DL (ref 1.6–2.3)
MCH RBC QN AUTO: 28.5 PG (ref 26.6–33)
MCHC RBC AUTO-ENTMCNC: 32.8 G/DL (ref 31.5–35.7)
MCV RBC AUTO: 87 FL (ref 79–97)
METHYLMALONATE SERPL-SCNC: 110 NMOL/L (ref 0–378)
MONOCYTES # BLD AUTO: 0.6 X10E3/UL (ref 0.1–0.9)
MONOCYTES NFR BLD AUTO: 9 %
NEUTROPHILS # BLD AUTO: 4.3 X10E3/UL (ref 1.4–7)
NEUTROPHILS NFR BLD AUTO: 62 %
PHOSPHATE SERPL-MCNC: 3.8 MG/DL (ref 3–4.3)
PHYTONADIONE SERPL-MCNC: 1.3 NG/ML (ref 0.1–2.2)
PLATELET # BLD AUTO: 341 X10E3/UL (ref 150–450)
POTASSIUM SERPL-SCNC: 4.6 MMOL/L (ref 3.5–5.2)
PREALB SERPL-MCNC: 21 MG/DL (ref 14–35)
PROT SERPL-MCNC: 6.8 G/DL (ref 6–8.5)
PROTHROMBIN TIME: 11.5 SEC (ref 9.1–12)
PTH-INTACT SERPL-MCNC: 30 PG/ML (ref 15–65)
RBC # BLD AUTO: 4.38 X10E6/UL (ref 3.77–5.28)
SODIUM SERPL-SCNC: 142 MMOL/L (ref 134–144)
VIT A SERPL-MCNC: 31.9 UG/DL (ref 18.9–57.3)
VIT B1 BLD-SCNC: 95 NMOL/L (ref 66.5–200)
WBC # BLD AUTO: 6.9 X10E3/UL (ref 3.4–10.8)
ZINC SERPL-MCNC: 83 UG/DL (ref 44–115)

## 2025-04-25 ENCOUNTER — OFFICE VISIT (OUTPATIENT)
Dept: BARIATRICS/WEIGHT MGMT | Facility: CLINIC | Age: 30
End: 2025-04-25
Payer: COMMERCIAL

## 2025-04-25 VITALS
TEMPERATURE: 98.2 F | HEIGHT: 66 IN | WEIGHT: 164.2 LBS | OXYGEN SATURATION: 99 % | BODY MASS INDEX: 26.39 KG/M2 | DIASTOLIC BLOOD PRESSURE: 74 MMHG | HEART RATE: 85 BPM | RESPIRATION RATE: 16 BRPM | SYSTOLIC BLOOD PRESSURE: 116 MMHG

## 2025-04-25 DIAGNOSIS — R53.83 FATIGUE, UNSPECIFIED TYPE: ICD-10-CM

## 2025-04-25 DIAGNOSIS — E66.3 OVERWEIGHT WITH BODY MASS INDEX (BMI) OF 26 TO 26.9 IN ADULT: Primary | ICD-10-CM

## 2025-04-25 DIAGNOSIS — K90.9 INTESTINAL MALABSORPTION, UNSPECIFIED TYPE: ICD-10-CM

## 2025-04-25 DIAGNOSIS — K21.9 GASTROESOPHAGEAL REFLUX DISEASE, UNSPECIFIED WHETHER ESOPHAGITIS PRESENT: ICD-10-CM

## 2025-04-25 DIAGNOSIS — R11.0 NAUSEA: ICD-10-CM

## 2025-04-25 DIAGNOSIS — R79.0 ABNORMAL BLOOD LEVEL OF IRON: ICD-10-CM

## 2025-04-25 DIAGNOSIS — E61.0 COPPER DEFICIENCY: ICD-10-CM

## 2025-04-25 DIAGNOSIS — E55.9 VITAMIN D DEFICIENCY: ICD-10-CM

## 2025-04-25 PROCEDURE — 99214 OFFICE O/P EST MOD 30 MIN: CPT | Performed by: NURSE PRACTITIONER

## 2025-04-25 RX ORDER — OMEPRAZOLE 40 MG/1
40 CAPSULE, DELAYED RELEASE ORAL DAILY PRN
Qty: 90 CAPSULE | Refills: 0 | Status: SHIPPED | OUTPATIENT
Start: 2025-04-25

## 2025-04-25 RX ORDER — ONDANSETRON 4 MG/1
4 TABLET, ORALLY DISINTEGRATING ORAL EVERY 8 HOURS PRN
Qty: 30 TABLET | Refills: 0 | Status: SHIPPED | OUTPATIENT
Start: 2025-04-25

## 2025-04-25 NOTE — PROGRESS NOTES
[facility] Bariatric Surgery  [address]  [address]  [facility] [address] [phone]        Patient Name:  Annie Kent.  :  1995      Date of Visit: 2025      Reason for Visit:   9 months postop     HPI: Annie Kent is a 29 y.o. female s/p robotic RNY/BPD/HHR 24 GDW (for pGOO w/ chronic nausea), [place]x [place] 3[place]2021 [place]/ [place]. [place] [place]     [place]gery complicated by intraperitoneal bleed (s/p 1u PRBC  for Hgb 7.7), discharged on POD#2, readmitted on POD#5 w/ dehydration, discharged 2 days later.  CT scan 24 for c/o LUQ pain revealed 8.5 x 6 cm evolving liquefied hematoma.      ________    Doing well.  Denies dysphagia, reflux, vomiting, and abdominal pain.  Occ heartburn, nausea. Takes omeprazole, zofran prn. Alternating diarrhea, constipation- yesterday had 3 BM.     Getting 60-80g prot/day.  Eating 3 meals per day, plus snacks. Drinking 64 fluid oz/day- water, liquid IV.      Physical activity: going to gym 2-3x/wk.    Labs 25- low copper. Given copper. Taking Patches: (4) plus PO vit K, copper.         PreRNY weight: 230 pounds.  Today's weight is 74.5 kg (164 lb 3.2 oz) pounds, today's  Body mass index is 26.5 kg/m²., and weight loss since surgery is 66 pounds.      Past Medical History:   Diagnosis Date    Anxiety 2017    Back pain     chiropractor; PRN nsaids    Carpal tunnel syndrome, left     Chronic nausea     chronic since LSG    Diabetes mellitus     GERD (gastroesophageal reflux disease)     Omeprazole daily. EGD prior to LSG;    H. pylori infection     EGD Dr. Arce 3/24 - abundant h. pylori, PrevPak prescribed, re-test pending    H/O gastric sleeve     Dr. Bird 3/2021    Hiatal hernia     History of abnormal cervical Pap smear     History of MRSA infection     skin boil at buttock as child    History of postpartum hemorrhage     History of pre-eclampsia 2018    History of twin pregnancy in prior pregnancy     Hypertension     Joint pain     Left  lateral epicondylitis     cortisone injections and PT    Palpitations     with high stress    Polycystic ovarian disease     PONV (postoperative nausea and vomiting)     Postpartum spinal headache     Prediabetes     per external records; controlled post-sleeve    Tendonitis of elbow, left     Vitamin B deficiency     Wears eyeglasses      Past Surgical History:   Procedure Laterality Date     SECTION      lower transverse     SECTION      COLPOSCOPY W/ BIOPSY / CURETTAGE      negative, per patient    D & C WITH SUCTION      EAB    ENDOSCOPY  2024    with biopsies    ENDOSCOPY N/A 2024    Procedure: ESOPHAGOGASTRODUODENOSCOPY;  Surgeon: Rhett Arce MD;  Location:  [facility];  Service: General;  Laterality: N/A;  SCOPE     GASTRIC BYPASS N/A 2024    Procedure: OLIVER-EN-Y GASTRIC BYPASS LAPAROSCOPIC WITH DAVINCI ROBOT;  Surgeon: Rhett Arce MD;  Location:  [facility];  Service: Robotics - DaVinci;  Laterality: N/A;    GASTRIC SLEEVE LAPAROSCOPIC  2021    Dr. Bird    HIATAL HERNIA REPAIR N/A 2024    Procedure: LAPAROSCOPIC HIATAL HERNIA REPAIR WITH DAVINCI ROBOT;  Surgeon: Rhett Arce MD;  Location:  [facility];  Service: Robotics - DaVinci;  Laterality: N/A;  HIATAL HERNIA TIME:     LAPAROSCOPIC CHOLECYSTECTOMY      gallstones    WISDOM TOOTH EXTRACTION       Outpatient Medications Marked as Taking for the 25 encounter (Office Visit) with Iva Valdez APRN   Medication Sig Dispense Refill    acetaminophen (TYLENOL) 500 MG tablet Take 1 tablet by mouth Every 6 (Six) Hours As Needed for Mild Pain.      cetirizine (zyrTEC) 10 MG tablet Take 1 tablet by mouth Daily.      Copper Gluconate (Copper Caps) 2 MG capsule Take 8 mg by mouth Daily for 7 days, THEN 6 mg Daily for 7 days, THEN 4 mg Daily for 7 days, THEN 2 mg Daily for 69 days. 132 each 0    cyanocobalamin 1000 MCG/ML injection Inject 1 mL into the  "appropriate muscle as directed by prescriber Every 30 (Thirty) Days.      NON FORMULARY PatchAid: MV, D, B12, B1, Iron      ondansetron ODT (ZOFRAN-ODT) 4 MG disintegrating tablet Place 1 tablet on the tongue Every 8 (Eight) Hours As Needed for Nausea or Vomiting. 30 tablet 0    valACYclovir (VALTREX) 500 MG tablet Take 1 tablet by mouth As Needed (zoster). prn      vitamin k 100 MCG tablet Take 1 tablet by mouth Daily.      [DISCONTINUED] omeprazole (priLOSEC) 40 MG capsule Take 1 capsule by mouth Daily. 90 capsule 0    [DISCONTINUED] ondansetron ODT (ZOFRAN-ODT) 4 MG disintegrating tablet Place 1 tablet on the tongue Every 8 (Eight) Hours As Needed for Nausea or Vomiting. 30 tablet 0       Allergies   Allergen Reactions    Lisinopril Cough       Social History     Socioeconomic History    Marital status:    Tobacco Use    Smoking status: Never    Smokeless tobacco: Never   Vaping Use    Vaping status: Never Used   Substance and Sexual Activity    Alcohol use: Yes     Comment: once a month, social    Drug use: No    Sexual activity: Defer     Partners: Male     Birth control/protection: Vasectomy       /74 (BP Location: Left arm, Patient Position: Sitting)   Pulse 85   Temp 98.2 °F (36.8 °C)   Resp 16   Ht 167.6 cm (66\")   Wt 74.5 kg (164 lb 3.2 oz)   SpO2 99%   BMI 26.50 kg/m²     Physical Exam  Constitutional:       Appearance: She is well-developed.   Cardiovascular:      Rate and Rhythm: Normal rate.   Pulmonary:      Effort: Pulmonary effort is normal.   Musculoskeletal:         General: Normal range of motion.   Neurological:      Mental Status: She is alert.   Psychiatric:         Thought Content: Thought content normal.         Judgment: Judgment normal.           Assessment:  9 months s/p robotic RNY/BPD/HHR 7/12/24 GDW (for pGOO w/ chronic nausea), hx LSG 3/2021 w/ Dr. Bird @[facility] [place].    ICD-10-CM ICD-9-CM   1. Overweight with body mass index (BMI) of 26 to 26.9 in adult  E66.3 278.02    " Z68.26 V85.22   2. Intestinal malabsorption, unspecified type  K90.9 579.9   3. Fatigue, unspecified type  R53.83 780.79   4. Copper deficiency  E61.0 275.1   5. Abnormal blood level of iron  R79.0 790.6   6. Vitamin D deficiency  E55.9 268.9   7. Nausea  R11.0 787.02   8. Gastroesophageal reflux disease, unspecified whether esophagitis present  K21.9 530.81     BMI is >= 25 and <30. (Overweight) The following options were offered after discussion;: see plan        Plan:  PPI, zofran Rx. Doing well. Continue w/ good food choices and healthy habits.  Continue protein >70g/day.  Increase routine exercise as able.  Routine bariatric labs ordered.  Continue vitamins w/ adjustments pending lab results.  Avoid ulcerogenics for life. Call w/ problems/concerns.       The patient was instructed to follow up in 3 months, sooner if needed.    I spent 30 minutes caring for Annie on this date of service. This time includes time spent by me in the following activities: preparing for the visit, reviewing tests, obtaining and/or reviewing a separately obtained history, performing a medically appropriate examination and/or evaluation, counseling and educating the patient/family/caregiver, ordering medications, tests, or procedures, and documenting information in the medical record.         Iva Valdez, APRN

## 2025-05-09 ENCOUNTER — RESULTS FOLLOW-UP (OUTPATIENT)
Dept: BARIATRICS/WEIGHT MGMT | Facility: CLINIC | Age: 30
End: 2025-05-09
Payer: COMMERCIAL

## 2025-05-09 LAB
25(OH)D3+25(OH)D2 SERPL-MCNC: 27.3 NG/ML (ref 30–100)
A-TOCOPHEROL VIT E SERPL-MCNC: 7.7 MG/L (ref 5.9–19.4)
ALBUMIN SERPL-MCNC: 4.7 G/DL (ref 3.9–4.9)
ALP SERPL-CCNC: 75 IU/L (ref 44–121)
ALT SERPL-CCNC: 15 IU/L (ref 0–32)
AST SERPL-CCNC: 21 IU/L (ref 0–40)
BASOPHILS # BLD AUTO: 0.1 X10E3/UL (ref 0–0.2)
BASOPHILS NFR BLD AUTO: 1 %
BILIRUB SERPL-MCNC: 0.4 MG/DL (ref 0–1.2)
BUN SERPL-MCNC: 8 MG/DL (ref 6–20)
BUN/CREAT SERPL: 12 (ref 9–23)
CALCIUM SERPL-MCNC: 9.6 MG/DL (ref 8.7–10.2)
CHLORIDE SERPL-SCNC: 103 MMOL/L (ref 96–106)
CO2 SERPL-SCNC: 23 MMOL/L (ref 20–29)
COPPER SERPL-MCNC: 74 UG/DL (ref 80–158)
CREAT SERPL-MCNC: 0.67 MG/DL (ref 0.57–1)
EGFRCR SERPLBLD CKD-EPI 2021: 120 ML/MIN/1.73
EOSINOPHIL # BLD AUTO: 0.1 X10E3/UL (ref 0–0.4)
EOSINOPHIL NFR BLD AUTO: 2 %
ERYTHROCYTE [DISTWIDTH] IN BLOOD BY AUTOMATED COUNT: 13.1 % (ref 11.7–15.4)
FERRITIN SERPL-MCNC: 64 NG/ML (ref 15–150)
FOLATE SERPL-MCNC: 7.7 NG/ML
GAMMA TOCOPHEROL SERPL-MCNC: 0.7 MG/L (ref 0.7–4.9)
GLOBULIN SER CALC-MCNC: 2.5 G/DL (ref 1.5–4.5)
GLUCOSE SERPL-MCNC: 84 MG/DL (ref 70–99)
HCT VFR BLD AUTO: 42.3 % (ref 34–46.6)
HGB BLD-MCNC: 13.8 G/DL (ref 11.1–15.9)
IMM GRANULOCYTES # BLD AUTO: 0 X10E3/UL (ref 0–0.1)
IMM GRANULOCYTES NFR BLD AUTO: 0 %
INR PPP: 1 (ref 0.9–1.2)
IRON SERPL-MCNC: 97 UG/DL (ref 27–159)
LYMPHOCYTES # BLD AUTO: 1.5 X10E3/UL (ref 0.7–3.1)
LYMPHOCYTES NFR BLD AUTO: 21 %
MAGNESIUM SERPL-MCNC: 2 MG/DL (ref 1.6–2.3)
MCH RBC QN AUTO: 28.9 PG (ref 26.6–33)
MCHC RBC AUTO-ENTMCNC: 32.6 G/DL (ref 31.5–35.7)
MCV RBC AUTO: 89 FL (ref 79–97)
METHYLMALONATE SERPL-SCNC: 161 NMOL/L (ref 0–378)
MONOCYTES # BLD AUTO: 0.5 X10E3/UL (ref 0.1–0.9)
MONOCYTES NFR BLD AUTO: 7 %
NEUTROPHILS # BLD AUTO: 4.9 X10E3/UL (ref 1.4–7)
NEUTROPHILS NFR BLD AUTO: 69 %
PHOSPHATE SERPL-MCNC: 4 MG/DL (ref 3–4.3)
PHYTONADIONE SERPL-MCNC: 0.24 NG/ML (ref 0.1–2.2)
PLATELET # BLD AUTO: 372 X10E3/UL (ref 150–450)
POTASSIUM SERPL-SCNC: 4.5 MMOL/L (ref 3.5–5.2)
PREALB SERPL-MCNC: 22 MG/DL (ref 14–35)
PROT SERPL-MCNC: 7.2 G/DL (ref 6–8.5)
PROTHROMBIN TIME: 11.3 SEC (ref 9.1–12)
PTH-INTACT SERPL-MCNC: 35 PG/ML (ref 15–65)
RBC # BLD AUTO: 4.77 X10E6/UL (ref 3.77–5.28)
SODIUM SERPL-SCNC: 141 MMOL/L (ref 134–144)
VIT A SERPL-MCNC: 36.1 UG/DL (ref 18.9–57.3)
VIT B1 BLD-SCNC: 156.7 NMOL/L (ref 66.5–200)
WBC # BLD AUTO: 7.1 X10E3/UL (ref 3.4–10.8)
ZINC SERPL-MCNC: 79 UG/DL (ref 44–115)

## 2025-07-25 ENCOUNTER — OFFICE VISIT (OUTPATIENT)
Dept: BARIATRICS/WEIGHT MGMT | Facility: CLINIC | Age: 30
End: 2025-07-25
Payer: COMMERCIAL

## 2025-07-25 VITALS
BODY MASS INDEX: 23.82 KG/M2 | SYSTOLIC BLOOD PRESSURE: 108 MMHG | TEMPERATURE: 98.7 F | WEIGHT: 148.2 LBS | HEIGHT: 66 IN | DIASTOLIC BLOOD PRESSURE: 76 MMHG | OXYGEN SATURATION: 99 % | HEART RATE: 95 BPM | RESPIRATION RATE: 16 BRPM

## 2025-07-25 DIAGNOSIS — R79.0 ABNORMAL BLOOD LEVEL OF IRON: ICD-10-CM

## 2025-07-25 DIAGNOSIS — R53.83 FATIGUE, UNSPECIFIED TYPE: ICD-10-CM

## 2025-07-25 DIAGNOSIS — E55.9 VITAMIN D DEFICIENCY: ICD-10-CM

## 2025-07-25 DIAGNOSIS — E55.9 HYPOVITAMINOSIS D: ICD-10-CM

## 2025-07-25 DIAGNOSIS — K90.9 INTESTINAL MALABSORPTION, UNSPECIFIED TYPE: Primary | ICD-10-CM

## 2025-07-25 DIAGNOSIS — Z98.84 S/P BARIATRIC SURGERY: ICD-10-CM

## 2025-07-25 DIAGNOSIS — R11.0 NAUSEA: ICD-10-CM

## 2025-07-25 RX ORDER — ONDANSETRON 4 MG/1
4 TABLET, ORALLY DISINTEGRATING ORAL EVERY 8 HOURS PRN
Qty: 15 TABLET | Refills: 0 | Status: SHIPPED | OUTPATIENT
Start: 2025-07-25

## 2025-07-25 NOTE — PROGRESS NOTES
[facility] Bariatric Surgery  [address]  [address]  [facility] [address] [phone]        Patient Name:  Annie Kent.  :  1995      Date of Visit: 2025      Reason for Visit:  12 months postop     HPI: Annie Kent is a 29 y.o. female s/p robotic RNY/BPD/HHR 24 GDW (for pGOO w/ chronic nausea), hx LSG 3/2021 w/ Dr. Bird @[facility] [place].     Surgery complicated by intraperitoneal bleed (s/p 1u PRBC  for Hgb 7.7), discharged on POD#2, readmitted on POD#5 w/ dehydration, discharged 2 days later.  CT scan 24 for c/o LUQ pain revealed 8.5 x 6 cm evolving liquefied hematoma.      ________    Doing well. States life has been stressful (going through the divorce process) and forgets or doesn't feel like eating. Denies dysphagia, reflux, vomiting, and abdominal pain. Situationally heartburn, nausea. Takes omeprazole, zofran prn. Nausea seems to be related to menstrual cycle. Alternating diarrhea, constipation.    Getting 60-80g prot/day on a good day. Really striving for the 100 grams per day. Primary protein sources, meats. Tries eating 3 meals per day, plus snacks. Drinking 64 fluid oz/day- water, liquid IV.      Physical activity: going to gym 2-3x/wk.    Labs 2025-low vitamin D (27.3), low copper (74). Meche has increased since labs on 2025 (64). Reports an intolerance to meche (GI side effects). However, has been taking daily supplementation.       Taking Patches: (4) plus, PO vit K, copper.     Would like to begin the process of excess skin removal.     PreRNY weight: 230 pounds.  Today's weight is 67.2 kg (148 lb 3.2 oz) pounds, today's  Body mass index is 23.92 kg/m²., and weight loss since surgery is 82 pounds.      Past Medical History:   Diagnosis Date    Anxiety 2017    Back pain     chiropractor; PRN nsaids    Carpal tunnel syndrome, left     Chronic nausea     chronic since LSG    Diabetes mellitus     GERD (gastroesophageal reflux disease)      Omeprazole daily. EGD prior to LSG;    H. pylori infection     EGD Dr. Arce 3/24 - abundant h. pylori, PrevPak prescribed, re-test pending    H/O gastric sleeve     Dr. Bird 3/2021    Hiatal hernia     History of abnormal cervical Pap smear     History of MRSA infection     skin boil at buttock as child    History of postpartum hemorrhage     History of pre-eclampsia 2018    History of twin pregnancy in prior pregnancy     Hypertension     Joint pain     Left lateral epicondylitis     cortisone injections and PT    Palpitations     with high stress    Polycystic ovarian disease     PONV (postoperative nausea and vomiting)     Postpartum spinal headache     Prediabetes     per external records; controlled post-sleeve    Tendonitis of elbow, left     Vitamin B deficiency     Wears eyeglasses      Past Surgical History:   Procedure Laterality Date     SECTION      lower transverse     SECTION      COLPOSCOPY W/ BIOPSY / CURETTAGE      negative, per patient    D & C WITH SUCTION      EAB    ENDOSCOPY  2024    with biopsies    ENDOSCOPY N/A 2024    Procedure: ESOPHAGOGASTRODUODENOSCOPY;  Surgeon: Rhett Arce MD;  Location:  [facility];  Service: General;  Laterality: N/A;  SCOPE     GASTRIC BYPASS N/A 2024    Procedure: OLIVER-EN-Y GASTRIC BYPASS LAPAROSCOPIC WITH DAVINCI ROBOT;  Surgeon: Rhett Arce MD;  Location:  [facility];  Service: Robotics - DaVinci;  Laterality: N/A;    GASTRIC SLEEVE LAPAROSCOPIC  2021    Dr. Bird    HIATAL HERNIA REPAIR N/A 2024    Procedure: LAPAROSCOPIC HIATAL HERNIA REPAIR WITH DAVINCI ROBOT;  Surgeon: Rhett Arce MD;  Location:  [facility];  Service: Robotics - DaVinci;  Laterality: N/A;  HIATAL HERNIA TIME:     LAPAROSCOPIC CHOLECYSTECTOMY      gallstones    WISDOM TOOTH EXTRACTION       Outpatient Medications Marked as Taking for the 25 encounter (Office Visit) with Amadeo Decker  "APRN   Medication Sig Dispense Refill    acetaminophen (TYLENOL) 500 MG tablet Take 1 tablet by mouth Every 6 (Six) Hours As Needed for Mild Pain.      cetirizine (zyrTEC) 10 MG tablet Take 1 tablet by mouth Daily.      Copper Gluconate 2 MG tablet Take 8 mg by mouth Daily for 7 days, THEN 6 mg Daily for 7 days, THEN 4 mg Daily for 7 days, THEN 2 mg Daily for 69 days. 132 tablet 0    cyanocobalamin 1000 MCG/ML injection Inject 1 mL into the appropriate muscle as directed by prescriber Every 30 (Thirty) Days.      NON FORMULARY PatchAid: MV, D, B12, B1, Iron      omeprazole (priLOSEC) 40 MG capsule Take 1 capsule by mouth Daily As Needed (heartburn). 90 capsule 0    ondansetron ODT (ZOFRAN-ODT) 4 MG disintegrating tablet Place 1 tablet on the tongue Every 8 (Eight) Hours As Needed for Nausea or Vomiting. 15 tablet 0    valACYclovir (VALTREX) 500 MG tablet Take 1 tablet by mouth As Needed (zoster). prn      vitamin D (ERGOCALCIFEROL) 1.25 MG (99968 UT) capsule capsule Take 1 capsule by mouth Every 7 (Seven) Days for 12 doses. 12 capsule 0    vitamin k 100 MCG tablet Take 1 tablet by mouth Daily.      [DISCONTINUED] ondansetron ODT (ZOFRAN-ODT) 4 MG disintegrating tablet Place 1 tablet on the tongue Every 8 (Eight) Hours As Needed for Nausea or Vomiting. 30 tablet 0       Allergies   Allergen Reactions    Lisinopril Cough       Social History     Socioeconomic History    Marital status:    Tobacco Use    Smoking status: Never    Smokeless tobacco: Never   Vaping Use    Vaping status: Never Used   Substance and Sexual Activity    Alcohol use: Yes     Comment: once a month, social    Drug use: No    Sexual activity: Defer     Partners: Male     Birth control/protection: Vasectomy       /76 (BP Location: Left arm, Patient Position: Sitting)   Pulse 95   Temp 98.7 °F (37.1 °C)   Resp 16   Ht 167.6 cm (66\")   Wt 67.2 kg (148 lb 3.2 oz)   SpO2 99%   BMI 23.92 kg/m²     Physical Exam  Constitutional:       " Appearance: She is well-developed.   Cardiovascular:      Rate and Rhythm: Normal rate.   Pulmonary:      Effort: Pulmonary effort is normal.   Musculoskeletal:         General: Normal range of motion.   Neurological:      Mental Status: She is alert.   Psychiatric:         Thought Content: Thought content normal.         Judgment: Judgment normal.       Assessment: 12 months s/p robotic RNY/BPD/HHR 7/12/24 GDW (for pGOO w/ chronic nausea), hx LSG 3/2021 w/ Dr. Bird @[facility] [place].      ICD-10-CM ICD-9-CM   1. Intestinal malabsorption, unspecified type  K90.9 579.9   2. S/P bariatric surgery  Z98.84 V45.86   3. Abnormal blood level of iron  R79.0 790.6   4. Vitamin D deficiency  E55.9 268.9   5. Hypovitaminosis D  E55.9 268.9   6. Fatigue, unspecified type  R53.83 780.79   7. Nausea  R11.0 787.02       BMI is within normal parameters. No other follow-up for BMI required.      Plan: Doing well w/ all things considered. Emphasized maintaining current BMI, specifically FFM. Prioritization on protein,  >70g/day. Zofran Rx. [employer] referral ordered. Continue w/ good food choices and healthy habits. Continue protein. Increase routine exercise as able.  Routine bariatric labs ordered.  Continue vitamins w/ adjustments pending lab results.  Avoid ulcerogenics for life. Call w/ problems/concerns.       The patient was instructed to follow up in 6 months, sooner if needed.    I spent 30 minutes caring for Annie on this date of service. This time includes time spent by me in the following activities: preparing for the visit, reviewing tests, performing a medically appropriate examination and/or evaluation, counseling and educating the patient/family/caregiver, referring and communicating with other health care professionals, care coordination, ordering medications, and ordering test(s).           MAGO De Guzman

## 2025-07-30 LAB
25(OH)D3+25(OH)D2 SERPL-MCNC: 40.6 NG/ML (ref 30–100)
A-TOCOPHEROL VIT E SERPL-MCNC: 8.2 MG/L (ref 5.9–19.4)
ALBUMIN SERPL-MCNC: 4.8 G/DL (ref 3.9–4.9)
ALP SERPL-CCNC: 64 IU/L (ref 44–121)
ALT SERPL-CCNC: 13 IU/L (ref 0–32)
AST SERPL-CCNC: 15 IU/L (ref 0–40)
BASOPHILS # BLD AUTO: 0.1 X10E3/UL (ref 0–0.2)
BASOPHILS NFR BLD AUTO: 1 %
BILIRUB SERPL-MCNC: 0.5 MG/DL (ref 0–1.2)
BUN SERPL-MCNC: 8 MG/DL (ref 6–20)
BUN/CREAT SERPL: 11 (ref 9–23)
CALCIUM SERPL-MCNC: 10.2 MG/DL (ref 8.7–10.2)
CHLORIDE SERPL-SCNC: 103 MMOL/L (ref 96–106)
CO2 SERPL-SCNC: 24 MMOL/L (ref 20–29)
COPPER SERPL-MCNC: 75 UG/DL (ref 80–158)
CREAT SERPL-MCNC: 0.74 MG/DL (ref 0.57–1)
EGFRCR SERPLBLD CKD-EPI 2021: 111 ML/MIN/1.73
EOSINOPHIL # BLD AUTO: 0.1 X10E3/UL (ref 0–0.4)
EOSINOPHIL NFR BLD AUTO: 1 %
ERYTHROCYTE [DISTWIDTH] IN BLOOD BY AUTOMATED COUNT: 13 % (ref 11.7–15.4)
FERRITIN SERPL-MCNC: 60 NG/ML (ref 15–150)
FOLATE SERPL-MCNC: 9.1 NG/ML
GAMMA TOCOPHEROL SERPL-MCNC: 0.5 MG/L (ref 0.7–4.9)
GLOBULIN SER CALC-MCNC: 3.2 G/DL (ref 1.5–4.5)
GLUCOSE SERPL-MCNC: 76 MG/DL (ref 70–99)
HCT VFR BLD AUTO: 48.2 % (ref 34–46.6)
HGB BLD-MCNC: 14.7 G/DL (ref 11.1–15.9)
IMM GRANULOCYTES # BLD AUTO: 0 X10E3/UL (ref 0–0.1)
IMM GRANULOCYTES NFR BLD AUTO: 0 %
INR PPP: 1 (ref 0.9–1.2)
IRON SERPL-MCNC: 88 UG/DL (ref 27–159)
LYMPHOCYTES # BLD AUTO: 1.2 X10E3/UL (ref 0.7–3.1)
LYMPHOCYTES NFR BLD AUTO: 11 %
MAGNESIUM SERPL-MCNC: 2.2 MG/DL (ref 1.6–2.3)
MCH RBC QN AUTO: 28.4 PG (ref 26.6–33)
MCHC RBC AUTO-ENTMCNC: 30.5 G/DL (ref 31.5–35.7)
MCV RBC AUTO: 93 FL (ref 79–97)
METHYLMALONATE SERPL-SCNC: 119 NMOL/L (ref 0–378)
MONOCYTES # BLD AUTO: 0.8 X10E3/UL (ref 0.1–0.9)
MONOCYTES NFR BLD AUTO: 7 %
NEUTROPHILS # BLD AUTO: 8.5 X10E3/UL (ref 1.4–7)
NEUTROPHILS NFR BLD AUTO: 80 %
PHOSPHATE SERPL-MCNC: 3.8 MG/DL (ref 3–4.3)
PHYTONADIONE SERPL-MCNC: 0.24 NG/ML (ref 0.1–2.2)
PLATELET # BLD AUTO: 365 X10E3/UL (ref 150–450)
POTASSIUM SERPL-SCNC: 4.3 MMOL/L (ref 3.5–5.2)
PREALB SERPL-MCNC: 36 MG/DL (ref 14–35)
PROT SERPL-MCNC: 8 G/DL (ref 6–8.5)
PROTHROMBIN TIME: 10.7 SEC (ref 9.1–12)
PTH-INTACT SERPL-MCNC: 26 PG/ML (ref 15–65)
RBC # BLD AUTO: 5.18 X10E6/UL (ref 3.77–5.28)
SODIUM SERPL-SCNC: 142 MMOL/L (ref 134–144)
VIT A SERPL-MCNC: 58.8 UG/DL (ref 18.9–57.3)
VIT B1 BLD-SCNC: 118.2 NMOL/L (ref 66.5–200)
WBC # BLD AUTO: 10.5 X10E3/UL (ref 3.4–10.8)
ZINC SERPL-MCNC: 78 UG/DL (ref 44–115)

## (undated) DEVICE — UNDRPD COMFRT GLD DRYPAD 36X57IN

## (undated) DEVICE — PAD,ARMBOARD,CONV,FOAM,2X8X20",12PR/CS: Brand: MEDLINE

## (undated) DEVICE — COLUMN DRAPE

## (undated) DEVICE — STAPLER 60: Brand: SUREFORM

## (undated) DEVICE — ENSEAL LAPAROSCOPIC TISSUE SEALER G2 ARTICULATING CURVED JAW FOR USE WITH G2 GENERATOR 5MM DIAMETER 45CM SHAFT LENGTH: Brand: ENSEAL

## (undated) DEVICE — TROC BLADLES ANCHORPORT/OPTI LP 8X120MM 1P/U

## (undated) DEVICE — PK BARIATRIC 10

## (undated) DEVICE — Device

## (undated) DEVICE — PENROSE DRAIN 18 X .5" SILICONE: Brand: MEDLINE

## (undated) DEVICE — GOWN,NON-REINFORCED,SIRUS,SET IN SLV,XXL: Brand: MEDLINE

## (undated) DEVICE — REDUCER: Brand: ENDOWRIST

## (undated) DEVICE — ST TBG AIRSEAL FLTR TRI LUM

## (undated) DEVICE — BLADELESS OBTURATOR: Brand: WECK VISTA

## (undated) DEVICE — SHT AIR TRANSFR COMFRT GLIDE LAT 40X80IN

## (undated) DEVICE — BLANKT WARM UPPR/BDY ARM/OUT 57X196CM

## (undated) DEVICE — SEAL

## (undated) DEVICE — GLV SURG SENSICARE PI MIC PF SZ8.5 LF STRL

## (undated) DEVICE — KT WARM LAP LIQUIDSCOPE/HELPOR W/WARMOR/CLTH 2/TROC/SWAB

## (undated) DEVICE — ARM DRAPE

## (undated) DEVICE — CONTN GRAD MEAS TRIANG 32OZ BLK

## (undated) DEVICE — Device: Brand: DEFENDO AIR/WATER/SUCTION AND BIOPSY VALVE